# Patient Record
Sex: FEMALE | Race: BLACK OR AFRICAN AMERICAN | NOT HISPANIC OR LATINO | Employment: FULL TIME | RURAL
[De-identification: names, ages, dates, MRNs, and addresses within clinical notes are randomized per-mention and may not be internally consistent; named-entity substitution may affect disease eponyms.]

---

## 2021-03-26 ENCOUNTER — OFFICE VISIT (OUTPATIENT)
Dept: FAMILY MEDICINE | Facility: CLINIC | Age: 46
End: 2021-03-26
Payer: COMMERCIAL

## 2021-03-26 VITALS
SYSTOLIC BLOOD PRESSURE: 124 MMHG | RESPIRATION RATE: 16 BRPM | TEMPERATURE: 97 F | BODY MASS INDEX: 37.64 KG/M2 | OXYGEN SATURATION: 99 % | DIASTOLIC BLOOD PRESSURE: 84 MMHG | HEART RATE: 70 BPM | WEIGHT: 248.38 LBS | HEIGHT: 68 IN

## 2021-03-26 DIAGNOSIS — E78.5 HYPERLIPIDEMIA, UNSPECIFIED HYPERLIPIDEMIA TYPE: ICD-10-CM

## 2021-03-26 DIAGNOSIS — K21.9 GASTROESOPHAGEAL REFLUX DISEASE WITHOUT ESOPHAGITIS: ICD-10-CM

## 2021-03-26 DIAGNOSIS — F41.9 ANXIETY: ICD-10-CM

## 2021-03-26 DIAGNOSIS — E11.42 TYPE 2 DIABETES MELLITUS WITH DIABETIC POLYNEUROPATHY, WITH LONG-TERM CURRENT USE OF INSULIN: Primary | ICD-10-CM

## 2021-03-26 DIAGNOSIS — F32.A DEPRESSION, UNSPECIFIED DEPRESSION TYPE: ICD-10-CM

## 2021-03-26 DIAGNOSIS — I10 HYPERTENSION, UNSPECIFIED TYPE: ICD-10-CM

## 2021-03-26 DIAGNOSIS — E66.9 OBESITY, UNSPECIFIED CLASSIFICATION, UNSPECIFIED OBESITY TYPE, UNSPECIFIED WHETHER SERIOUS COMORBIDITY PRESENT: ICD-10-CM

## 2021-03-26 DIAGNOSIS — Z79.4 TYPE 2 DIABETES MELLITUS WITH DIABETIC POLYNEUROPATHY, WITH LONG-TERM CURRENT USE OF INSULIN: Primary | ICD-10-CM

## 2021-03-26 PROCEDURE — 1126F AMNT PAIN NOTED NONE PRSNT: CPT | Mod: ,,, | Performed by: NURSE PRACTITIONER

## 2021-03-26 PROCEDURE — 99214 OFFICE O/P EST MOD 30 MIN: CPT | Mod: ,,, | Performed by: NURSE PRACTITIONER

## 2021-03-26 PROCEDURE — 1126F PR PAIN SEVERITY QUANTIFIED, NO PAIN PRESENT: ICD-10-PCS | Mod: ,,, | Performed by: NURSE PRACTITIONER

## 2021-03-26 PROCEDURE — 99214 PR OFFICE/OUTPT VISIT, EST, LEVL IV, 30-39 MIN: ICD-10-PCS | Mod: ,,, | Performed by: NURSE PRACTITIONER

## 2021-03-26 RX ORDER — SEMAGLUTIDE 1.34 MG/ML
0.5 INJECTION, SOLUTION SUBCUTANEOUS
COMMUNITY
End: 2021-06-10 | Stop reason: SDUPTHER

## 2021-03-26 RX ORDER — METFORMIN HYDROCHLORIDE 500 MG/1
500 TABLET ORAL 2 TIMES DAILY WITH MEALS
COMMUNITY
End: 2021-06-10 | Stop reason: SDUPTHER

## 2021-03-26 RX ORDER — TRIAMTERENE/HYDROCHLOROTHIAZID 37.5-25 MG
1 TABLET ORAL DAILY
COMMUNITY
Start: 2021-01-22 | End: 2021-06-10 | Stop reason: SDUPTHER

## 2021-03-26 RX ORDER — CITALOPRAM 20 MG/1
20 TABLET, FILM COATED ORAL DAILY
COMMUNITY
Start: 2021-02-26 | End: 2021-06-10 | Stop reason: SDUPTHER

## 2021-03-26 RX ORDER — INSULIN DEGLUDEC 100 U/ML
60 INJECTION, SOLUTION SUBCUTANEOUS NIGHTLY
COMMUNITY

## 2021-03-26 RX ORDER — INSULIN ASPART 100 [IU]/ML
40 INJECTION, SOLUTION INTRAVENOUS; SUBCUTANEOUS 3 TIMES DAILY
COMMUNITY
Start: 2020-12-31

## 2021-04-20 ENCOUNTER — HOSPITAL ENCOUNTER (EMERGENCY)
Facility: HOSPITAL | Age: 46
Discharge: HOME | End: 2021-04-20
Payer: COMMERCIAL

## 2021-04-20 VITALS
SYSTOLIC BLOOD PRESSURE: 130 MMHG | HEIGHT: 68 IN | RESPIRATION RATE: 18 BRPM | WEIGHT: 244 LBS | OXYGEN SATURATION: 98 % | BODY MASS INDEX: 36.98 KG/M2 | DIASTOLIC BLOOD PRESSURE: 85 MMHG | TEMPERATURE: 98 F | HEART RATE: 76 BPM

## 2021-04-20 DIAGNOSIS — F32.A DEPRESSION: ICD-10-CM

## 2021-04-20 DIAGNOSIS — E78.5 HYPERLIPIDEMIA: ICD-10-CM

## 2021-04-20 DIAGNOSIS — R10.10 UPPER ABDOMINAL PAIN: ICD-10-CM

## 2021-04-20 DIAGNOSIS — Z79.4 TYPE 2 DIABETES MELLITUS WITH DIABETIC POLYNEUROPATHY, WITH LONG-TERM CURRENT USE OF INSULIN: ICD-10-CM

## 2021-04-20 DIAGNOSIS — F41.9 ANXIETY: ICD-10-CM

## 2021-04-20 DIAGNOSIS — E66.9 OBESITY: ICD-10-CM

## 2021-04-20 DIAGNOSIS — I10 HYPERTENSION: ICD-10-CM

## 2021-04-20 DIAGNOSIS — K21.9 GASTROESOPHAGEAL REFLUX DISEASE WITHOUT ESOPHAGITIS: ICD-10-CM

## 2021-04-20 DIAGNOSIS — R10.9 ABDOMINAL PAIN, UNSPECIFIED ABDOMINAL LOCATION: Primary | ICD-10-CM

## 2021-04-20 DIAGNOSIS — E11.42 TYPE 2 DIABETES MELLITUS WITH DIABETIC POLYNEUROPATHY, WITH LONG-TERM CURRENT USE OF INSULIN: ICD-10-CM

## 2021-04-20 DIAGNOSIS — K59.00 CONSTIPATION, UNSPECIFIED CONSTIPATION TYPE: ICD-10-CM

## 2021-04-20 LAB
ALBUMIN SERPL BCP-MCNC: 3.8 G/DL (ref 3.5–5)
ALBUMIN/GLOB SERPL: 0.9 {RATIO}
ALP SERPL-CCNC: 119 U/L (ref 37–98)
ALT SERPL W P-5'-P-CCNC: 73 U/L (ref 13–56)
AMYLASE SERPL-CCNC: 38 U/L (ref 25–115)
ANION GAP SERPL CALCULATED.3IONS-SCNC: 11 MMOL/L (ref 7–16)
AST SERPL W P-5'-P-CCNC: 17 U/L (ref 15–37)
BASOPHILS # BLD AUTO: 0.03 K/UL (ref 0–0.2)
BASOPHILS NFR BLD AUTO: 0.3 % (ref 0–1)
BILIRUB SERPL-MCNC: 0.4 MG/DL (ref 0–1.2)
BILIRUB UR QL STRIP: NEGATIVE
BUN SERPL-MCNC: 9 MG/DL (ref 7–18)
BUN/CREAT SERPL: 9 (ref 6–20)
CALCIUM SERPL-MCNC: 9.3 MG/DL (ref 8.5–10.1)
CHLORIDE SERPL-SCNC: 102 MMOL/L (ref 98–107)
CLARITY UR: CLEAR
CO2 SERPL-SCNC: 32 MMOL/L (ref 21–32)
COLOR UR: YELLOW
CREAT SERPL-MCNC: 0.99 MG/DL (ref 0.55–1.02)
DIFFERENTIAL METHOD BLD: ABNORMAL
EOSINOPHIL # BLD AUTO: 0.26 K/UL (ref 0–0.5)
EOSINOPHIL NFR BLD AUTO: 2.4 % (ref 1–4)
ERYTHROCYTE [DISTWIDTH] IN BLOOD BY AUTOMATED COUNT: 12.7 % (ref 11.5–14.5)
GLOBULIN SER-MCNC: 4.4 G/DL (ref 2–4)
GLUCOSE SERPL-MCNC: 176 MG/DL (ref 74–106)
GLUCOSE UR STRIP-MCNC: NEGATIVE MG/DL
HCT VFR BLD AUTO: 37.9 % (ref 38–47)
HGB BLD-MCNC: 12.6 G/DL (ref 12–16)
IMM GRANULOCYTES # BLD AUTO: 0.04 K/UL (ref 0–0.04)
IMM GRANULOCYTES NFR BLD: 0.4 % (ref 0–0.4)
KETONES UR STRIP-SCNC: NEGATIVE MG/DL
LEUKOCYTE ESTERASE UR QL STRIP: NEGATIVE
LIPASE SERPL-CCNC: 189 U/L (ref 73–393)
LYMPHOCYTES # BLD AUTO: 2.91 K/UL (ref 1–4.8)
LYMPHOCYTES NFR BLD AUTO: 26.6 % (ref 27–41)
MCH RBC QN AUTO: 29.6 PG (ref 27–31)
MCHC RBC AUTO-ENTMCNC: 33.2 G/DL (ref 32–36)
MCV RBC AUTO: 89.2 FL (ref 80–96)
MONOCYTES # BLD AUTO: 0.42 K/UL (ref 0–0.8)
MONOCYTES NFR BLD AUTO: 3.8 % (ref 2–6)
MPC BLD CALC-MCNC: 10 FL (ref 9.4–12.4)
NEUTROPHILS # BLD AUTO: 7.27 K/UL (ref 1.8–7.7)
NEUTROPHILS NFR BLD AUTO: 66.5 % (ref 53–65)
NITRITE UR QL STRIP: NEGATIVE
NRBC # BLD AUTO: 0 X10E3/UL
NRBC, AUTO (.00): 0 %
PH UR STRIP: 5.5 PH UNITS
PLATELET # BLD AUTO: 328 K/UL (ref 150–400)
POTASSIUM SERPL-SCNC: 3.8 MMOL/L (ref 3.5–5.1)
PROT SERPL-MCNC: 8.2 G/DL (ref 6.4–8.2)
PROT UR QL STRIP: NEGATIVE
RBC # BLD AUTO: 4.25 M/UL (ref 4.2–5.4)
RBC # UR STRIP: NEGATIVE /UL
SODIUM SERPL-SCNC: 141 MMOL/L (ref 136–145)
SP GR UR STRIP: 1.02
UROBILINOGEN UR STRIP-ACNC: 0.2 MG/DL
WBC # BLD AUTO: 10.93 K/UL (ref 4.5–11)

## 2021-04-20 PROCEDURE — 80053 COMPREHEN METABOLIC PANEL: CPT | Performed by: NURSE PRACTITIONER

## 2021-04-20 PROCEDURE — 25500020 PHARM REV CODE 255: Performed by: NURSE PRACTITIONER

## 2021-04-20 PROCEDURE — 99283 PR EMERGENCY DEPT VISIT,LEVEL III: ICD-10-PCS | Mod: ,,, | Performed by: NURSE PRACTITIONER

## 2021-04-20 PROCEDURE — 99283 EMERGENCY DEPT VISIT LOW MDM: CPT | Mod: ,,, | Performed by: NURSE PRACTITIONER

## 2021-04-20 PROCEDURE — 85025 COMPLETE CBC W/AUTO DIFF WBC: CPT | Performed by: NURSE PRACTITIONER

## 2021-04-20 PROCEDURE — 99285 EMERGENCY DEPT VISIT HI MDM: CPT | Mod: 25

## 2021-04-20 PROCEDURE — 82150 ASSAY OF AMYLASE: CPT | Performed by: NURSE PRACTITIONER

## 2021-04-20 PROCEDURE — 36415 COLL VENOUS BLD VENIPUNCTURE: CPT | Performed by: NURSE PRACTITIONER

## 2021-04-20 PROCEDURE — 81003 URINALYSIS AUTO W/O SCOPE: CPT | Performed by: NURSE PRACTITIONER

## 2021-04-20 PROCEDURE — 83690 ASSAY OF LIPASE: CPT | Performed by: NURSE PRACTITIONER

## 2021-04-20 RX ORDER — POLYETHYLENE GLYCOL 3350 17 G/17G
17 POWDER, FOR SOLUTION ORAL DAILY
Qty: 10 PACKET | Refills: 0 | Status: SHIPPED | OUTPATIENT
Start: 2021-04-20 | End: 2023-09-14

## 2021-04-20 RX ORDER — ONDANSETRON 4 MG/1
4 TABLET, FILM COATED ORAL EVERY 6 HOURS
Qty: 12 TABLET | Refills: 0 | Status: SHIPPED | OUTPATIENT
Start: 2021-04-20 | End: 2023-01-19

## 2021-04-20 RX ADMIN — IOPAMIDOL 100 ML: 755 INJECTION, SOLUTION INTRAVENOUS at 10:04

## 2021-04-26 DIAGNOSIS — R10.10 UPPER ABDOMINAL PAIN: Primary | ICD-10-CM

## 2021-05-11 ENCOUNTER — ANESTHESIA EVENT (OUTPATIENT)
Dept: GASTROENTEROLOGY | Facility: HOSPITAL | Age: 46
End: 2021-05-11
Payer: COMMERCIAL

## 2021-05-11 ENCOUNTER — HOSPITAL ENCOUNTER (OUTPATIENT)
Dept: GASTROENTEROLOGY | Facility: HOSPITAL | Age: 46
Discharge: HOME | End: 2021-05-11
Attending: INTERNAL MEDICINE
Payer: COMMERCIAL

## 2021-05-11 ENCOUNTER — ANESTHESIA (OUTPATIENT)
Dept: GASTROENTEROLOGY | Facility: HOSPITAL | Age: 46
End: 2021-05-11
Payer: COMMERCIAL

## 2021-05-11 VITALS
OXYGEN SATURATION: 100 % | DIASTOLIC BLOOD PRESSURE: 79 MMHG | RESPIRATION RATE: 12 BRPM | WEIGHT: 225 LBS | BODY MASS INDEX: 34.1 KG/M2 | SYSTOLIC BLOOD PRESSURE: 128 MMHG | HEIGHT: 68 IN | HEART RATE: 65 BPM | TEMPERATURE: 98 F

## 2021-05-11 DIAGNOSIS — R10.10 UPPER ABDOMINAL PAIN: ICD-10-CM

## 2021-05-11 DIAGNOSIS — R10.13 EPIGASTRIC PAIN: ICD-10-CM

## 2021-05-11 DIAGNOSIS — R13.19 ESOPHAGEAL DYSPHAGIA: ICD-10-CM

## 2021-05-11 LAB
GLUCOSE SERPL-MCNC: 127 MG/DL (ref 70–105)
GLUCOSE SERPL-MCNC: 127 MG/DL (ref 70–110)

## 2021-05-11 PROCEDURE — 27000284 HC CANNULA NASAL: Performed by: NURSE ANESTHETIST, CERTIFIED REGISTERED

## 2021-05-11 PROCEDURE — 88305 SURGICAL PATHOLOGY: ICD-10-PCS | Mod: 26,,, | Performed by: PATHOLOGY

## 2021-05-11 PROCEDURE — 43450 DILATE ESOPHAGUS 1/MULT PASS: CPT

## 2021-05-11 PROCEDURE — 82962 GLUCOSE BLOOD TEST: CPT

## 2021-05-11 PROCEDURE — 88342 SURGICAL PATHOLOGY: ICD-10-PCS | Mod: 26,,, | Performed by: PATHOLOGY

## 2021-05-11 PROCEDURE — 25000003 PHARM REV CODE 250: Performed by: NURSE ANESTHETIST, CERTIFIED REGISTERED

## 2021-05-11 PROCEDURE — 88342 IMHCHEM/IMCYTCHM 1ST ANTB: CPT | Mod: 26,,, | Performed by: PATHOLOGY

## 2021-05-11 PROCEDURE — C1889 IMPLANT/INSERT DEVICE, NOC: HCPCS

## 2021-05-11 PROCEDURE — 37000009 HC ANESTHESIA EA ADD 15 MINS

## 2021-05-11 PROCEDURE — 88305 TISSUE EXAM BY PATHOLOGIST: CPT | Mod: 26,,, | Performed by: PATHOLOGY

## 2021-05-11 PROCEDURE — 63600175 PHARM REV CODE 636 W HCPCS: Performed by: NURSE ANESTHETIST, CERTIFIED REGISTERED

## 2021-05-11 PROCEDURE — D9220A PRA ANESTHESIA: Mod: ,,, | Performed by: NURSE ANESTHETIST, CERTIFIED REGISTERED

## 2021-05-11 PROCEDURE — 88305 TISSUE EXAM BY PATHOLOGIST: CPT | Mod: SUR | Performed by: INTERNAL MEDICINE

## 2021-05-11 PROCEDURE — 27201423 OPTIME MED/SURG SUP & DEVICES STERILE SUPPLY

## 2021-05-11 PROCEDURE — 37000008 HC ANESTHESIA 1ST 15 MINUTES

## 2021-05-11 PROCEDURE — D9220A PRA ANESTHESIA: ICD-10-PCS | Mod: ,,, | Performed by: NURSE ANESTHETIST, CERTIFIED REGISTERED

## 2021-05-11 PROCEDURE — 43239 EGD BIOPSY SINGLE/MULTIPLE: CPT

## 2021-05-11 RX ORDER — PROPOFOL 10 MG/ML
VIAL (ML) INTRAVENOUS
Status: DISCONTINUED | OUTPATIENT
Start: 2021-05-11 | End: 2021-05-11

## 2021-05-11 RX ORDER — FENTANYL CITRATE 50 UG/ML
INJECTION, SOLUTION INTRAMUSCULAR; INTRAVENOUS
Status: DISCONTINUED | OUTPATIENT
Start: 2021-05-11 | End: 2021-05-11

## 2021-05-11 RX ORDER — LIDOCAINE HYDROCHLORIDE 20 MG/ML
INJECTION INTRAVENOUS
Status: DISCONTINUED | OUTPATIENT
Start: 2021-05-11 | End: 2021-05-11

## 2021-05-11 RX ORDER — SODIUM CHLORIDE 0.9 % (FLUSH) 0.9 %
10 SYRINGE (ML) INJECTION
Status: DISCONTINUED | OUTPATIENT
Start: 2021-05-11 | End: 2021-05-12 | Stop reason: HOSPADM

## 2021-05-11 RX ORDER — PANTOPRAZOLE SODIUM 40 MG/1
40 TABLET, DELAYED RELEASE ORAL DAILY
Qty: 30 TABLET | Refills: 2 | Status: SHIPPED | OUTPATIENT
Start: 2021-05-11 | End: 2021-06-10 | Stop reason: SDUPTHER

## 2021-05-11 RX ADMIN — PROPOFOL 50 MG: 10 INJECTION, EMULSION INTRAVENOUS at 01:05

## 2021-05-11 RX ADMIN — FENTANYL CITRATE 50 MCG: 50 INJECTION INTRAMUSCULAR; INTRAVENOUS at 01:05

## 2021-05-11 RX ADMIN — SODIUM CHLORIDE: 9 INJECTION, SOLUTION INTRAVENOUS at 01:05

## 2021-05-11 RX ADMIN — LIDOCAINE HYDROCHLORIDE 50 MG: 20 INJECTION, SOLUTION INTRAVENOUS at 01:05

## 2021-05-12 LAB
DHEA SERPL-MCNC: NORMAL
ESTROGEN SERPL-MCNC: NORMAL PG/ML
LAB AP GROSS DESCRIPTION: NORMAL
LAB AP LABORATORY NOTES: NORMAL
T3RU NFR SERPL: NORMAL %

## 2021-05-14 ENCOUNTER — TELEPHONE (OUTPATIENT)
Dept: GASTROENTEROLOGY | Facility: CLINIC | Age: 46
End: 2021-05-14

## 2021-05-17 ENCOUNTER — OFFICE VISIT (OUTPATIENT)
Dept: GASTROENTEROLOGY | Facility: CLINIC | Age: 46
End: 2021-05-17
Payer: COMMERCIAL

## 2021-05-17 VITALS
HEIGHT: 68 IN | WEIGHT: 244 LBS | SYSTOLIC BLOOD PRESSURE: 125 MMHG | HEART RATE: 89 BPM | DIASTOLIC BLOOD PRESSURE: 85 MMHG | OXYGEN SATURATION: 97 % | BODY MASS INDEX: 36.98 KG/M2

## 2021-05-17 DIAGNOSIS — E11.42 TYPE 2 DIABETES MELLITUS WITH DIABETIC POLYNEUROPATHY, WITH LONG-TERM CURRENT USE OF INSULIN: ICD-10-CM

## 2021-05-17 DIAGNOSIS — Z79.4 TYPE 2 DIABETES MELLITUS WITH DIABETIC NEUROPATHY, WITH LONG-TERM CURRENT USE OF INSULIN: ICD-10-CM

## 2021-05-17 DIAGNOSIS — E11.40 TYPE 2 DIABETES MELLITUS WITH DIABETIC NEUROPATHY, WITH LONG-TERM CURRENT USE OF INSULIN: ICD-10-CM

## 2021-05-17 DIAGNOSIS — K76.0 FATTY LIVER: ICD-10-CM

## 2021-05-17 DIAGNOSIS — Z79.4 TYPE 2 DIABETES MELLITUS WITH DIABETIC POLYNEUROPATHY, WITH LONG-TERM CURRENT USE OF INSULIN: ICD-10-CM

## 2021-05-17 DIAGNOSIS — R74.8 ELEVATED LIVER ENZYMES: Primary | ICD-10-CM

## 2021-05-17 DIAGNOSIS — R13.19 ESOPHAGEAL DYSPHAGIA: ICD-10-CM

## 2021-05-17 DIAGNOSIS — R10.13 EPIGASTRIC PAIN: ICD-10-CM

## 2021-05-17 PROCEDURE — 99214 PR OFFICE/OUTPT VISIT, EST, LEVL IV, 30-39 MIN: ICD-10-PCS | Mod: ,,, | Performed by: NURSE PRACTITIONER

## 2021-05-17 PROCEDURE — 99214 OFFICE O/P EST MOD 30 MIN: CPT | Mod: ,,, | Performed by: NURSE PRACTITIONER

## 2021-05-17 PROCEDURE — 3008F PR BODY MASS INDEX (BMI) DOCUMENTED: ICD-10-PCS | Mod: ,,, | Performed by: NURSE PRACTITIONER

## 2021-05-17 PROCEDURE — 3008F BODY MASS INDEX DOCD: CPT | Mod: ,,, | Performed by: NURSE PRACTITIONER

## 2021-06-10 ENCOUNTER — OFFICE VISIT (OUTPATIENT)
Dept: INTERNAL MEDICINE | Facility: CLINIC | Age: 46
End: 2021-06-10
Payer: COMMERCIAL

## 2021-06-10 VITALS
SYSTOLIC BLOOD PRESSURE: 130 MMHG | HEART RATE: 86 BPM | HEIGHT: 68 IN | DIASTOLIC BLOOD PRESSURE: 70 MMHG | WEIGHT: 236 LBS | RESPIRATION RATE: 16 BRPM | OXYGEN SATURATION: 97 % | BODY MASS INDEX: 35.77 KG/M2

## 2021-06-10 DIAGNOSIS — K75.81 NASH (NONALCOHOLIC STEATOHEPATITIS): ICD-10-CM

## 2021-06-10 DIAGNOSIS — G25.81 RESTLESS LEGS: ICD-10-CM

## 2021-06-10 DIAGNOSIS — I10 ESSENTIAL HYPERTENSION: ICD-10-CM

## 2021-06-10 DIAGNOSIS — K21.9 GASTROESOPHAGEAL REFLUX DISEASE, UNSPECIFIED WHETHER ESOPHAGITIS PRESENT: ICD-10-CM

## 2021-06-10 DIAGNOSIS — E11.9 TYPE 2 DIABETES MELLITUS WITHOUT COMPLICATION, WITH LONG-TERM CURRENT USE OF INSULIN: ICD-10-CM

## 2021-06-10 DIAGNOSIS — Z76.89 ENCOUNTER TO ESTABLISH CARE WITH NEW DOCTOR: Primary | ICD-10-CM

## 2021-06-10 DIAGNOSIS — Z79.4 TYPE 2 DIABETES MELLITUS WITHOUT COMPLICATION, WITH LONG-TERM CURRENT USE OF INSULIN: ICD-10-CM

## 2021-06-10 PROCEDURE — 99214 PR OFFICE/OUTPT VISIT, EST, LEVL IV, 30-39 MIN: ICD-10-PCS | Mod: S$PBB,,, | Performed by: INTERNAL MEDICINE

## 2021-06-10 PROCEDURE — 99214 OFFICE O/P EST MOD 30 MIN: CPT | Mod: S$PBB,,, | Performed by: INTERNAL MEDICINE

## 2021-06-10 PROCEDURE — 3008F BODY MASS INDEX DOCD: CPT | Mod: ,,, | Performed by: INTERNAL MEDICINE

## 2021-06-10 PROCEDURE — 1126F AMNT PAIN NOTED NONE PRSNT: CPT | Mod: ,,, | Performed by: INTERNAL MEDICINE

## 2021-06-10 PROCEDURE — 1126F PR PAIN SEVERITY QUANTIFIED, NO PAIN PRESENT: ICD-10-PCS | Mod: ,,, | Performed by: INTERNAL MEDICINE

## 2021-06-10 PROCEDURE — 99214 OFFICE O/P EST MOD 30 MIN: CPT | Mod: PBBFAC | Performed by: INTERNAL MEDICINE

## 2021-06-10 PROCEDURE — 3008F PR BODY MASS INDEX (BMI) DOCUMENTED: ICD-10-PCS | Mod: ,,, | Performed by: INTERNAL MEDICINE

## 2021-06-10 RX ORDER — PANTOPRAZOLE SODIUM 40 MG/1
40 TABLET, DELAYED RELEASE ORAL DAILY
Qty: 90 TABLET | Refills: 3 | Status: SHIPPED | OUTPATIENT
Start: 2021-06-10 | End: 2022-07-05

## 2021-06-10 RX ORDER — ROPINIROLE 0.25 MG/1
0.25 TABLET, FILM COATED ORAL NIGHTLY
Qty: 30 TABLET | Refills: 3 | Status: SHIPPED | OUTPATIENT
Start: 2021-06-10 | End: 2023-09-14

## 2021-06-10 RX ORDER — GABAPENTIN 300 MG/1
300 CAPSULE ORAL DAILY
Qty: 90 CAPSULE | Refills: 3 | Status: SHIPPED | OUTPATIENT
Start: 2021-06-10 | End: 2023-09-14 | Stop reason: SDUPTHER

## 2021-06-10 RX ORDER — METFORMIN HYDROCHLORIDE 500 MG/1
500 TABLET ORAL 2 TIMES DAILY WITH MEALS
Qty: 180 TABLET | Refills: 3 | Status: SHIPPED | OUTPATIENT
Start: 2021-06-10 | End: 2022-07-31

## 2021-06-10 RX ORDER — TRIAMTERENE/HYDROCHLOROTHIAZID 37.5-25 MG
1 TABLET ORAL DAILY
Qty: 90 TABLET | Refills: 3 | Status: SHIPPED | OUTPATIENT
Start: 2021-06-10 | End: 2022-08-08

## 2021-06-10 RX ORDER — CITALOPRAM 20 MG/1
20 TABLET, FILM COATED ORAL DAILY
Qty: 90 TABLET | Refills: 3 | Status: SHIPPED | OUTPATIENT
Start: 2021-06-10 | End: 2022-08-08

## 2021-06-10 RX ORDER — GABAPENTIN 300 MG/1
CAPSULE ORAL
COMMUNITY
Start: 2021-06-04 | End: 2021-06-10 | Stop reason: SDUPTHER

## 2021-06-10 RX ORDER — SEMAGLUTIDE 1.34 MG/ML
0.5 INJECTION, SOLUTION SUBCUTANEOUS
Qty: 1 PEN | Refills: 11 | Status: SHIPPED | OUTPATIENT
Start: 2021-06-10 | End: 2022-06-17

## 2021-06-11 ENCOUNTER — HOSPITAL ENCOUNTER (OUTPATIENT)
Dept: RADIOLOGY | Facility: HOSPITAL | Age: 46
Discharge: HOME | End: 2021-06-11
Attending: NURSE PRACTITIONER
Payer: COMMERCIAL

## 2021-06-11 DIAGNOSIS — K76.0 FATTY LIVER: ICD-10-CM

## 2021-06-11 DIAGNOSIS — R74.8 ELEVATED LIVER ENZYMES: ICD-10-CM

## 2021-06-11 PROCEDURE — 91200 LIVER ELASTOGRAPHY: CPT | Mod: 26,,,

## 2021-06-11 PROCEDURE — 91200 US ELASTOGRAPHY LIVER: ICD-10-PCS | Mod: 26,,,

## 2021-06-11 PROCEDURE — 91200 LIVER ELASTOGRAPHY: CPT | Mod: TC

## 2021-06-14 ENCOUNTER — OFFICE VISIT (OUTPATIENT)
Dept: GASTROENTEROLOGY | Facility: CLINIC | Age: 46
End: 2021-06-14
Payer: COMMERCIAL

## 2021-06-14 VITALS
WEIGHT: 238 LBS | HEIGHT: 68 IN | DIASTOLIC BLOOD PRESSURE: 85 MMHG | SYSTOLIC BLOOD PRESSURE: 136 MMHG | BODY MASS INDEX: 36.07 KG/M2 | HEART RATE: 90 BPM | OXYGEN SATURATION: 98 %

## 2021-06-14 DIAGNOSIS — K75.81 NASH (NONALCOHOLIC STEATOHEPATITIS): ICD-10-CM

## 2021-06-14 DIAGNOSIS — K21.9 GASTROESOPHAGEAL REFLUX DISEASE, UNSPECIFIED WHETHER ESOPHAGITIS PRESENT: ICD-10-CM

## 2021-06-14 DIAGNOSIS — R74.8 ELEVATED LIVER ENZYMES: Primary | ICD-10-CM

## 2021-06-14 PROCEDURE — 3008F BODY MASS INDEX DOCD: CPT | Mod: ,,, | Performed by: NURSE PRACTITIONER

## 2021-06-14 PROCEDURE — 99214 OFFICE O/P EST MOD 30 MIN: CPT | Mod: ,,, | Performed by: NURSE PRACTITIONER

## 2021-06-14 PROCEDURE — 99214 PR OFFICE/OUTPT VISIT, EST, LEVL IV, 30-39 MIN: ICD-10-PCS | Mod: ,,, | Performed by: NURSE PRACTITIONER

## 2021-06-14 PROCEDURE — 3008F PR BODY MASS INDEX (BMI) DOCUMENTED: ICD-10-PCS | Mod: ,,, | Performed by: NURSE PRACTITIONER

## 2021-06-17 ENCOUNTER — TELEPHONE (OUTPATIENT)
Dept: GASTROENTEROLOGY | Facility: CLINIC | Age: 46
End: 2021-06-17

## 2021-06-17 DIAGNOSIS — R74.8 ELEVATED LIVER ENZYMES: Primary | ICD-10-CM

## 2021-06-17 RX ORDER — PREDNISONE 20 MG/1
20 TABLET ORAL DAILY
Qty: 14 TABLET | Refills: 0 | Status: SHIPPED | OUTPATIENT
Start: 2021-06-17 | End: 2021-07-01

## 2021-07-06 ENCOUNTER — OFFICE VISIT (OUTPATIENT)
Dept: GASTROENTEROLOGY | Facility: CLINIC | Age: 46
End: 2021-07-06
Payer: COMMERCIAL

## 2021-07-06 VITALS
BODY MASS INDEX: 34.71 KG/M2 | HEIGHT: 68 IN | SYSTOLIC BLOOD PRESSURE: 137 MMHG | HEART RATE: 96 BPM | DIASTOLIC BLOOD PRESSURE: 86 MMHG | OXYGEN SATURATION: 98 % | WEIGHT: 229 LBS

## 2021-07-06 DIAGNOSIS — R74.8 ELEVATED LIVER ENZYMES: Primary | ICD-10-CM

## 2021-07-06 DIAGNOSIS — K59.00 CONSTIPATION, UNSPECIFIED CONSTIPATION TYPE: ICD-10-CM

## 2021-07-06 DIAGNOSIS — K75.81 NASH (NONALCOHOLIC STEATOHEPATITIS): ICD-10-CM

## 2021-07-06 DIAGNOSIS — K21.9 GASTROESOPHAGEAL REFLUX DISEASE, UNSPECIFIED WHETHER ESOPHAGITIS PRESENT: ICD-10-CM

## 2021-07-06 PROCEDURE — 3008F PR BODY MASS INDEX (BMI) DOCUMENTED: ICD-10-PCS | Mod: ,,, | Performed by: NURSE PRACTITIONER

## 2021-07-06 PROCEDURE — 99214 PR OFFICE/OUTPT VISIT, EST, LEVL IV, 30-39 MIN: ICD-10-PCS | Mod: ,,, | Performed by: NURSE PRACTITIONER

## 2021-07-06 PROCEDURE — 3008F BODY MASS INDEX DOCD: CPT | Mod: ,,, | Performed by: NURSE PRACTITIONER

## 2021-07-06 PROCEDURE — 99214 OFFICE O/P EST MOD 30 MIN: CPT | Mod: ,,, | Performed by: NURSE PRACTITIONER

## 2021-07-07 ENCOUNTER — TELEPHONE (OUTPATIENT)
Dept: GASTROENTEROLOGY | Facility: CLINIC | Age: 46
End: 2021-07-07

## 2021-07-07 DIAGNOSIS — R74.8 ELEVATED LIVER ENZYMES: Primary | ICD-10-CM

## 2021-07-07 RX ORDER — PREDNISONE 20 MG/1
20 TABLET ORAL DAILY
Qty: 14 TABLET | Refills: 0 | Status: SHIPPED | OUTPATIENT
Start: 2021-07-07 | End: 2023-01-19

## 2021-12-14 ENCOUNTER — HOSPITAL ENCOUNTER (OUTPATIENT)
Dept: RADIOLOGY | Facility: HOSPITAL | Age: 46
Discharge: HOME | End: 2021-12-14
Attending: NURSE PRACTITIONER
Payer: COMMERCIAL

## 2021-12-14 ENCOUNTER — OFFICE VISIT (OUTPATIENT)
Dept: GASTROENTEROLOGY | Facility: CLINIC | Age: 46
End: 2021-12-14
Payer: COMMERCIAL

## 2021-12-14 VITALS
BODY MASS INDEX: 34.56 KG/M2 | DIASTOLIC BLOOD PRESSURE: 90 MMHG | SYSTOLIC BLOOD PRESSURE: 138 MMHG | WEIGHT: 228 LBS | OXYGEN SATURATION: 98 % | HEIGHT: 68 IN | HEART RATE: 75 BPM

## 2021-12-14 DIAGNOSIS — K75.81 NASH (NONALCOHOLIC STEATOHEPATITIS): Primary | ICD-10-CM

## 2021-12-14 DIAGNOSIS — R74.8 ELEVATED LIVER ENZYMES: ICD-10-CM

## 2021-12-14 DIAGNOSIS — K21.9 GASTROESOPHAGEAL REFLUX DISEASE, UNSPECIFIED WHETHER ESOPHAGITIS PRESENT: ICD-10-CM

## 2021-12-14 PROCEDURE — 76700 US EXAM ABDOM COMPLETE: CPT | Mod: 26,,, | Performed by: RADIOLOGY

## 2021-12-14 PROCEDURE — 99214 OFFICE O/P EST MOD 30 MIN: CPT | Mod: ,,, | Performed by: NURSE PRACTITIONER

## 2021-12-14 PROCEDURE — 99214 PR OFFICE/OUTPT VISIT, EST, LEVL IV, 30-39 MIN: ICD-10-PCS | Mod: ,,, | Performed by: NURSE PRACTITIONER

## 2021-12-14 PROCEDURE — 76700 US EXAM ABDOM COMPLETE: CPT | Mod: TC

## 2021-12-14 PROCEDURE — 76700 US ABDOMEN COMPLETE: ICD-10-PCS | Mod: 26,,, | Performed by: RADIOLOGY

## 2022-03-23 ENCOUNTER — HOSPITAL ENCOUNTER (EMERGENCY)
Facility: HOSPITAL | Age: 47
Discharge: HOME | End: 2022-03-23
Payer: COMMERCIAL

## 2022-03-23 VITALS
HEIGHT: 68 IN | SYSTOLIC BLOOD PRESSURE: 134 MMHG | TEMPERATURE: 99 F | OXYGEN SATURATION: 100 % | DIASTOLIC BLOOD PRESSURE: 93 MMHG | WEIGHT: 226 LBS | HEART RATE: 74 BPM | BODY MASS INDEX: 34.25 KG/M2 | RESPIRATION RATE: 16 BRPM

## 2022-03-23 DIAGNOSIS — Z87.19 HISTORY OF ESOPHAGEAL STRICTURE: Primary | ICD-10-CM

## 2022-03-23 DIAGNOSIS — R13.19 ESOPHAGEAL DYSPHAGIA: ICD-10-CM

## 2022-03-23 DIAGNOSIS — E11.65 TYPE 2 DIABETES MELLITUS WITH HYPERGLYCEMIA, UNSPECIFIED WHETHER LONG TERM INSULIN USE: ICD-10-CM

## 2022-03-23 LAB
ALBUMIN SERPL BCP-MCNC: 3.8 G/DL (ref 3.5–5)
ALBUMIN/GLOB SERPL: 1 {RATIO}
ALP SERPL-CCNC: 113 U/L (ref 39–100)
ALT SERPL W P-5'-P-CCNC: 65 U/L (ref 13–56)
ANION GAP SERPL CALCULATED.3IONS-SCNC: 9 MMOL/L (ref 7–16)
AST SERPL W P-5'-P-CCNC: 13 U/L (ref 15–37)
BASOPHILS # BLD AUTO: 0.03 K/UL (ref 0–0.2)
BASOPHILS NFR BLD AUTO: 0.3 % (ref 0–1)
BILIRUB SERPL-MCNC: 0.4 MG/DL (ref 0–1.2)
BILIRUB UR QL STRIP: NEGATIVE
BUN SERPL-MCNC: 6 MG/DL (ref 7–18)
BUN/CREAT SERPL: 7 (ref 6–20)
CALCIUM SERPL-MCNC: 9.8 MG/DL (ref 8.5–10.1)
CHLORIDE SERPL-SCNC: 97 MMOL/L (ref 98–107)
CLARITY UR: CLEAR
CO2 SERPL-SCNC: 32 MMOL/L (ref 21–32)
COLOR UR: YELLOW
CREAT SERPL-MCNC: 0.86 MG/DL (ref 0.55–1.02)
DIFFERENTIAL METHOD BLD: ABNORMAL
EOSINOPHIL # BLD AUTO: 0.2 K/UL (ref 0–0.5)
EOSINOPHIL NFR BLD AUTO: 1.8 % (ref 1–4)
ERYTHROCYTE [DISTWIDTH] IN BLOOD BY AUTOMATED COUNT: 12.5 % (ref 11.5–14.5)
GLOBULIN SER-MCNC: 3.9 G/DL (ref 2–4)
GLUCOSE SERPL-MCNC: 256 MG/DL (ref 70–105)
GLUCOSE SERPL-MCNC: 342 MG/DL (ref 74–106)
GLUCOSE SERPL-MCNC: 354 MG/DL (ref 70–105)
GLUCOSE UR STRIP-MCNC: >=1000 MG/DL
HCT VFR BLD AUTO: 39.1 % (ref 38–47)
HGB BLD-MCNC: 13.6 G/DL (ref 12–16)
IMM GRANULOCYTES # BLD AUTO: 0.05 K/UL (ref 0–0.04)
IMM GRANULOCYTES NFR BLD: 0.5 % (ref 0–0.4)
KETONES UR STRIP-SCNC: NEGATIVE MG/DL
LEUKOCYTE ESTERASE UR QL STRIP: NEGATIVE
LYMPHOCYTES # BLD AUTO: 3.98 K/UL (ref 1–4.8)
LYMPHOCYTES NFR BLD AUTO: 36.5 % (ref 27–41)
MCH RBC QN AUTO: 29.2 PG (ref 27–31)
MCHC RBC AUTO-ENTMCNC: 34.8 G/DL (ref 32–36)
MCV RBC AUTO: 84.1 FL (ref 80–96)
MONOCYTES # BLD AUTO: 0.56 K/UL (ref 0–0.8)
MONOCYTES NFR BLD AUTO: 5.1 % (ref 2–6)
MPC BLD CALC-MCNC: 10.8 FL (ref 9.4–12.4)
NEUTROPHILS # BLD AUTO: 6.09 K/UL (ref 1.8–7.7)
NEUTROPHILS NFR BLD AUTO: 55.8 % (ref 53–65)
NITRITE UR QL STRIP: NEGATIVE
NRBC # BLD AUTO: 0 X10E3/UL
NRBC, AUTO (.00): 0 %
PH UR STRIP: 5.5 PH UNITS
PLATELET # BLD AUTO: 328 K/UL (ref 150–400)
POTASSIUM SERPL-SCNC: 3.7 MMOL/L (ref 3.5–5.1)
PROT SERPL-MCNC: 7.7 G/DL (ref 6.4–8.2)
PROT UR QL STRIP: NEGATIVE
RBC # BLD AUTO: 4.65 M/UL (ref 4.2–5.4)
RBC # UR STRIP: NEGATIVE /UL
SODIUM SERPL-SCNC: 134 MMOL/L (ref 136–145)
SP GR UR STRIP: <=1.005
UROBILINOGEN UR STRIP-ACNC: 0.2 MG/DL
WBC # BLD AUTO: 10.91 K/UL (ref 4.5–11)

## 2022-03-23 PROCEDURE — 80053 COMPREHEN METABOLIC PANEL: CPT | Performed by: NURSE PRACTITIONER

## 2022-03-23 PROCEDURE — 99284 EMERGENCY DEPT VISIT MOD MDM: CPT | Mod: 25

## 2022-03-23 PROCEDURE — 82962 GLUCOSE BLOOD TEST: CPT

## 2022-03-23 PROCEDURE — 63600175 PHARM REV CODE 636 W HCPCS: Performed by: NURSE PRACTITIONER

## 2022-03-23 PROCEDURE — 99283 PR EMERGENCY DEPT VISIT,LEVEL III: ICD-10-PCS | Mod: ICN,,, | Performed by: NURSE PRACTITIONER

## 2022-03-23 PROCEDURE — 96374 THER/PROPH/DIAG INJ IV PUSH: CPT

## 2022-03-23 PROCEDURE — 36415 COLL VENOUS BLD VENIPUNCTURE: CPT | Performed by: NURSE PRACTITIONER

## 2022-03-23 PROCEDURE — 99283 EMERGENCY DEPT VISIT LOW MDM: CPT | Mod: ICN,,, | Performed by: NURSE PRACTITIONER

## 2022-03-23 PROCEDURE — 85025 COMPLETE CBC W/AUTO DIFF WBC: CPT | Performed by: NURSE PRACTITIONER

## 2022-03-23 PROCEDURE — 96361 HYDRATE IV INFUSION ADD-ON: CPT

## 2022-03-23 PROCEDURE — 81003 URINALYSIS AUTO W/O SCOPE: CPT | Performed by: NURSE PRACTITIONER

## 2022-03-23 PROCEDURE — 25000003 PHARM REV CODE 250: Performed by: NURSE PRACTITIONER

## 2022-03-23 RX ORDER — MAG HYDROX/ALUMINUM HYD/SIMETH 200-200-20
30 SUSPENSION, ORAL (FINAL DOSE FORM) ORAL ONCE
Status: COMPLETED | OUTPATIENT
Start: 2022-03-23 | End: 2022-03-23

## 2022-03-23 RX ORDER — SODIUM CHLORIDE 9 MG/ML
1000 INJECTION, SOLUTION INTRAVENOUS
Status: COMPLETED | OUTPATIENT
Start: 2022-03-23 | End: 2022-03-23

## 2022-03-23 RX ORDER — LIDOCAINE HYDROCHLORIDE 20 MG/ML
10 SOLUTION OROPHARYNGEAL ONCE
Status: COMPLETED | OUTPATIENT
Start: 2022-03-23 | End: 2022-03-23

## 2022-03-23 RX ADMIN — LIDOCAINE HYDROCHLORIDE 10 ML: 20 SOLUTION ORAL; TOPICAL at 05:03

## 2022-03-23 RX ADMIN — ALUMINUM HYDROXIDE, MAGNESIUM HYDROXIDE, AND SIMETHICONE 30 ML: 200; 200; 20 SUSPENSION ORAL at 05:03

## 2022-03-23 RX ADMIN — SODIUM CHLORIDE 1000 ML: 9 INJECTION, SOLUTION INTRAVENOUS at 05:03

## 2022-03-23 RX ADMIN — HUMAN INSULIN 10 UNITS: 100 INJECTION, SOLUTION SUBCUTANEOUS at 06:03

## 2022-03-23 NOTE — DISCHARGE INSTRUCTIONS
Continue to take prescriptions as previously directed. Monitor your glucose 3-4x a day and keep a log to take to your primary care provider within the next week. A referral has been sent to Dr. Ferrera, they will call you with an appointment date and time. Return to the ED for worsening signs and symptoms or otherwise as needed.

## 2022-03-23 NOTE — ED TRIAGE NOTES
Pt c/o dysphagia, has had esophageal dilitation in past, pt states she needs another, states she has been trying to make an appointment with no success for a week

## 2022-03-23 NOTE — ED PROVIDER NOTES
"Encounter Date: 3/23/2022       History     Chief Complaint   Patient presents with    Dysphagia    Sore Throat     Pt states has been having difficulty swallowing for 1x week, gets worse by the day, tried making appointment with Dr. Ferrera     45 y/o AAF with PMH of DM, HTN and hx of esophageal stricture with prior dilation presents with c/o worsening dysphagia and indigestion. Reports has difficulty with solids and liquids with food feeling like it just gets "stuck". Reports symptoms are worse at night when she is lying down. Reports she would like to get back in with Dr. Ferrera to be seen for possible dilation again. She denies shortness of breath or chest pain. Denies nausea or vomiting or abd pain. States having  Normal BM. States she has tried taking tums with minimal relief. There are no exacerbating or remitting factors.     The history is provided by the patient.     Review of patient's allergies indicates:   Allergen Reactions    Phenergan plain Other (See Comments)     Pt states feeling 'funny'     Past Medical History:   Diagnosis Date    Allergy     Depression     Diabetes mellitus, type 2     Epigastric pain 2021    Hypertension     Sickle cell anemia     trait not anemia     Past Surgical History:   Procedure Laterality Date    APPENDECTOMY      CARPAL TUNNEL RELEASE      CARPAL TUNNEL RELEASE       SECTION      CHOLECYSTECTOMY      ESOPHAGOGASTRODUODENOSCOPY  2021    HYSTERECTOMY       Family History   Problem Relation Age of Onset    Hypertension Mother     Diabetes Mother     Hyperlipidemia Mother     Glaucoma Mother     Cancer Father      Social History     Tobacco Use    Smoking status: Never Smoker    Smokeless tobacco: Never Used   Substance Use Topics    Alcohol use: Never    Drug use: Never     Review of Systems   Constitutional: Negative for chills and fever.        Per HPI   Respiratory: Negative for cough, choking, chest tightness and shortness " of breath.    Cardiovascular: Negative for chest pain, palpitations and leg swelling.   Gastrointestinal: Negative for abdominal pain, constipation, diarrhea, nausea and vomiting.   Genitourinary: Negative for dysuria and frequency.   Musculoskeletal: Negative for back pain.   Neurological: Negative for dizziness, weakness and headaches.   All other systems reviewed and are negative.      Physical Exam     Initial Vitals [03/23/22 1627]   BP Pulse Resp Temp SpO2   (!) 134/93 74 16 98.6 °F (37 °C) 100 %      MAP       --         Physical Exam    Constitutional: She appears well-developed and well-nourished. She is Obese . She is active and cooperative.   Cardiovascular: Normal rate, regular rhythm, normal heart sounds and normal pulses.   Pulmonary/Chest: Effort normal and breath sounds normal.   Abdominal: Abdomen is soft. Bowel sounds are normal. There is no abdominal tenderness.     Neurological: She is alert and oriented to person, place, and time.   Skin: Skin is warm, dry and intact. Capillary refill takes less than 2 seconds.   Psychiatric: She has a normal mood and affect. Her speech is normal and behavior is normal. Judgment and thought content normal. Cognition and memory are normal.         Medical Screening Exam   See Full Note    ED Course   Procedures  Labs Reviewed   COMPREHENSIVE METABOLIC PANEL - Abnormal; Notable for the following components:       Result Value    Sodium 134 (*)     Chloride 97 (*)     Glucose 342 (*)     BUN 6 (*)     Alk Phos 113 (*)     ALT 65 (*)     AST 13 (*)     All other components within normal limits   URINALYSIS, REFLEX TO URINE CULTURE - Abnormal; Notable for the following components:    Glucose, UA >=1000 (*)     All other components within normal limits   POCT GLUCOSE MONITORING CONTINUOUS - Abnormal; Notable for the following components:    POC Glucose 354 (*)     All other components within normal limits   POCT GLUCOSE MONITORING CONTINUOUS - Abnormal; Notable for  the following components:    POC Glucose 256 (*)     All other components within normal limits   CBC W/ AUTO DIFFERENTIAL    Narrative:     The following orders were created for panel order CBC auto differential.  Procedure                               Abnormality         Status                     ---------                               -----------         ------                     CBC with Differential[[id number]]                            In process                   Please view results for these tests on the individual orders.   CBC WITH DIFFERENTIAL   EXTRA TUBES    Narrative:     The following orders were created for panel order EXTRA TUBES.  Procedure                               Abnormality         Status                     ---------                               -----------         ------                     Light Blue Top Hold[[id number]]                              In process                 Light Green Top Hold[[id number]]                             In process                   Please view results for these tests on the individual orders.   LIGHT BLUE TOP HOLD   LIGHT GREEN TOP HOLD          Imaging Results          XR ABDOMEN, ACUTE 2 OR MORE VIEWS WITH CHEST (Final result)  Result time 03/23/22 17:29:52    Final result by Kelvin Torres DO (03/23/22 17:29:52)                 Impression:      No acute findings.    Point of Service: [facility]      Electronically signed by: Kelvin Torres  Date:    03/23/2022  Time:    17:29             Narrative:    EXAMINATION:  XR ABDOMEN ACUTE 2 OR MORE VIEWS WITH CHEST    CLINICAL HISTORY:  abd pain, dysphagia;    COMPARISON:  Abdominal x-ray April 20, 2021    TECHNIQUE:  Single frontal view of the chest as well as frontal views of the abdomen in the supine and upright  position.    FINDINGS:  Heart size appears within normal limits.  No focal consolidation, pleural effusion, or pneumothorax.  Visualized osseous and surrounding soft tissue  structures demonstrate no acute abnormality.  Surgical clips project over the right upper quadrant of the abdomen.  Nonspecific nonobstructive bowel gas pattern.  No free intraperitoneal air demonstrated.                                 Medications   aluminum-magnesium hydroxide-simethicone 200-200-20 mg/5 mL suspension 30 mL (30 mLs Oral Given 3/23/22 1737)     And   LIDOcaine HCl 2% oral solution 10 mL (10 mLs Oral Given 3/23/22 1737)   0.9%  NaCl infusion (0 mLs Intravenous Stopped 3/23/22 1836)   insulin regular injection 10 Units (10 Units Intravenous Given 3/23/22 1800)                Counseled on supportive measures. Warning s/s discussed and return precautions given; the patient has v/u.         Clinical Impression:   Final diagnoses:  [Z87.19] History of esophageal stricture (Primary)  [E11.65] Type 2 diabetes mellitus with hyperglycemia, unspecified whether long term insulin use  [R13.19] Esophageal dysphagia          ED Disposition Condition    Discharge Stable        ED Prescriptions     None        Follow-up Information     Follow up With Specialties Details Why Contact Info    [facility] In 2 days  [address] [place] [id number]  [phone]             LACEY Winston  03/23/22 2540

## 2022-03-24 NOTE — ED NOTES
Lab specimen (blood-red, green, purple and blue tops) collected at 17:38.  At  19:15 CBC had not resulted yet.  I called [phone] and they said to call [facility].  Ankit at [facility] looked for purple top and could not find it.

## 2022-04-12 DIAGNOSIS — R13.19 ESOPHAGEAL DYSPHAGIA: ICD-10-CM

## 2022-04-12 DIAGNOSIS — Z87.19 HISTORY OF ESOPHAGEAL STRICTURE: Primary | ICD-10-CM

## 2022-04-22 DIAGNOSIS — Z11.59 SPECIAL SCREENING EXAMINATION FOR VIRAL DISEASE: Primary | ICD-10-CM

## 2022-04-27 ENCOUNTER — ANESTHESIA EVENT (OUTPATIENT)
Dept: GASTROENTEROLOGY | Facility: HOSPITAL | Age: 47
End: 2022-04-27
Payer: COMMERCIAL

## 2022-04-27 ENCOUNTER — ANESTHESIA (OUTPATIENT)
Dept: GASTROENTEROLOGY | Facility: HOSPITAL | Age: 47
End: 2022-04-27
Payer: COMMERCIAL

## 2022-04-27 ENCOUNTER — HOSPITAL ENCOUNTER (OUTPATIENT)
Dept: GASTROENTEROLOGY | Facility: HOSPITAL | Age: 47
Discharge: HOME | End: 2022-04-27
Attending: INTERNAL MEDICINE
Payer: COMMERCIAL

## 2022-04-27 VITALS
BODY MASS INDEX: 35.61 KG/M2 | HEIGHT: 68 IN | RESPIRATION RATE: 16 BRPM | DIASTOLIC BLOOD PRESSURE: 71 MMHG | TEMPERATURE: 98 F | SYSTOLIC BLOOD PRESSURE: 119 MMHG | WEIGHT: 235 LBS | HEART RATE: 75 BPM | OXYGEN SATURATION: 100 %

## 2022-04-27 DIAGNOSIS — Z87.19 HISTORY OF ESOPHAGEAL STRICTURE: ICD-10-CM

## 2022-04-27 DIAGNOSIS — R13.19 ESOPHAGEAL DYSPHAGIA: ICD-10-CM

## 2022-04-27 DIAGNOSIS — K29.00 ACUTE SUPERFICIAL GASTRITIS WITHOUT HEMORRHAGE: ICD-10-CM

## 2022-04-27 LAB
GLUCOSE SERPL-MCNC: 389 MG/DL (ref 70–105)
GLUCOSE SERPL-MCNC: 389 MG/DL (ref 70–110)
GLUCOSE SERPL-MCNC: 419 MG/DL (ref 70–105)
GLUCOSE SERPL-MCNC: 450 MG/DL (ref 70–105)
GLUCOSE SERPL-MCNC: 450 MG/DL (ref 70–110)
GLUCOSE SERPL-MCNC: 478 MG/DL (ref 70–110)

## 2022-04-27 PROCEDURE — 43450 DILATE ESOPHAGUS 1/MULT PASS: CPT

## 2022-04-27 PROCEDURE — 82962 GLUCOSE BLOOD TEST: CPT

## 2022-04-27 PROCEDURE — 88305 TISSUE EXAM BY PATHOLOGIST: CPT | Mod: SUR | Performed by: INTERNAL MEDICINE

## 2022-04-27 PROCEDURE — 25000003 PHARM REV CODE 250: Performed by: NURSE ANESTHETIST, CERTIFIED REGISTERED

## 2022-04-27 PROCEDURE — 27000284 HC CANNULA NASAL: Performed by: NURSE ANESTHETIST, CERTIFIED REGISTERED

## 2022-04-27 PROCEDURE — 37000008 HC ANESTHESIA 1ST 15 MINUTES

## 2022-04-27 PROCEDURE — 88342 SURGICAL PATHOLOGY: ICD-10-PCS | Mod: 26,,, | Performed by: PATHOLOGY

## 2022-04-27 PROCEDURE — 27201423 OPTIME MED/SURG SUP & DEVICES STERILE SUPPLY

## 2022-04-27 PROCEDURE — 63600175 PHARM REV CODE 636 W HCPCS: Performed by: INTERNAL MEDICINE

## 2022-04-27 PROCEDURE — 88342 IMHCHEM/IMCYTCHM 1ST ANTB: CPT | Mod: 26,,, | Performed by: PATHOLOGY

## 2022-04-27 PROCEDURE — 88305 TISSUE EXAM BY PATHOLOGIST: CPT | Mod: 26,,, | Performed by: PATHOLOGY

## 2022-04-27 PROCEDURE — 63600175 PHARM REV CODE 636 W HCPCS: Performed by: NURSE ANESTHETIST, CERTIFIED REGISTERED

## 2022-04-27 PROCEDURE — D9220A PRA ANESTHESIA: Mod: ANES,,, | Performed by: ANESTHESIOLOGY

## 2022-04-27 PROCEDURE — C1889 IMPLANT/INSERT DEVICE, NOC: HCPCS

## 2022-04-27 PROCEDURE — 27000284 HC CANNULA NASAL: Performed by: ANESTHESIOLOGY

## 2022-04-27 PROCEDURE — D9220A PRA ANESTHESIA: ICD-10-PCS | Mod: ANES,,, | Performed by: ANESTHESIOLOGY

## 2022-04-27 PROCEDURE — 88305 SURGICAL PATHOLOGY: ICD-10-PCS | Mod: 26,,, | Performed by: PATHOLOGY

## 2022-04-27 PROCEDURE — D9220A PRA ANESTHESIA: Mod: CRNA,,, | Performed by: NURSE ANESTHETIST, CERTIFIED REGISTERED

## 2022-04-27 PROCEDURE — D9220A PRA ANESTHESIA: ICD-10-PCS | Mod: CRNA,,, | Performed by: NURSE ANESTHETIST, CERTIFIED REGISTERED

## 2022-04-27 PROCEDURE — 43239 EGD BIOPSY SINGLE/MULTIPLE: CPT

## 2022-04-27 PROCEDURE — 37000009 HC ANESTHESIA EA ADD 15 MINS

## 2022-04-27 RX ORDER — SODIUM CHLORIDE 0.9 % (FLUSH) 0.9 %
10 SYRINGE (ML) INJECTION
Status: DISCONTINUED | OUTPATIENT
Start: 2022-04-27 | End: 2022-04-28 | Stop reason: HOSPADM

## 2022-04-27 RX ORDER — PROPOFOL 10 MG/ML
VIAL (ML) INTRAVENOUS
Status: DISCONTINUED | OUTPATIENT
Start: 2022-04-27 | End: 2022-04-27

## 2022-04-27 RX ORDER — LIDOCAINE HYDROCHLORIDE 20 MG/ML
INJECTION, SOLUTION EPIDURAL; INFILTRATION; INTRACAUDAL; PERINEURAL
Status: DISCONTINUED | OUTPATIENT
Start: 2022-04-27 | End: 2022-04-27

## 2022-04-27 RX ADMIN — LIDOCAINE HYDROCHLORIDE 50 MG: 20 INJECTION, SOLUTION EPIDURAL; INFILTRATION; INTRACAUDAL; PERINEURAL at 12:04

## 2022-04-27 RX ADMIN — PROPOFOL 25 MG: 10 INJECTION, EMULSION INTRAVENOUS at 12:04

## 2022-04-27 RX ADMIN — SODIUM CHLORIDE: 9 INJECTION, SOLUTION INTRAVENOUS at 11:04

## 2022-04-27 RX ADMIN — HUMAN INSULIN 5 UNITS: 100 INJECTION, SOLUTION SUBCUTANEOUS at 12:04

## 2022-04-27 RX ADMIN — HUMAN INSULIN 10 UNITS: 100 INJECTION, SOLUTION SUBCUTANEOUS at 12:04

## 2022-04-27 RX ADMIN — HUMAN INSULIN 10 UNITS: 100 INJECTION, SOLUTION SUBCUTANEOUS at 01:04

## 2022-04-27 NOTE — OR NURSING
Rechecked patient blood glucose. Blood glucose 419. Informed Dr Ferrera. Received orders to give 10 units regular insulin subcutaneously. Received orders to recheck blood glucose in 30 minutes, inform patient to take home blood glucose medication when discharged.

## 2022-04-27 NOTE — H&P
[facility]  Gastroenterology  H&P    Patient Name: Cole Herrera  MRN: [id number]  Admission Date: 2022  Code Status: Full Code    Attending Provider: Kirk Ferrera MD  Primary Care Physician: [facility]  Principal Problem:<principal problem not specified>    Subjective:     History of Present Illness: Pt has esophageal dysphagia; for egd with dilation.    Past Medical History:   Diagnosis Date    Allergy     Depression     Diabetes mellitus, type 2     Epigastric pain 2021    Hypertension     Sickle cell anemia     trait not anemia       Past Surgical History:   Procedure Laterality Date    APPENDECTOMY      CARPAL TUNNEL RELEASE      CARPAL TUNNEL RELEASE       SECTION      CHOLECYSTECTOMY      ESOPHAGOGASTRODUODENOSCOPY  2021    HYSTERECTOMY         Review of patient's allergies indicates:   Allergen Reactions    Phenergan plain Other (See Comments)     Pt states feeling 'funny'     Family History     Problem Relation (Age of Onset)    Cancer Father    Diabetes Mother    Glaucoma Mother    Hyperlipidemia Mother    Hypertension Mother        Tobacco Use    Smoking status: Never Smoker    Smokeless tobacco: Never Used   Substance and Sexual Activity    Alcohol use: Never    Drug use: Never    Sexual activity: Yes     Review of Systems   Respiratory: Negative.    Cardiovascular: Negative.    Gastrointestinal: Negative.      Objective:     Vital Signs (Most Recent):  Temp: 97.7 °F (36.5 °C) (22 1045)  Pulse: 87 (22 1045)  Resp: 13 (22 1045)  BP: 114/79 (22 1045)  SpO2: (!) 81 % (22 1045) Vital Signs (24h Range):  Temp:  [97.7 °F (36.5 °C)] 97.7 °F (36.5 °C)  Pulse:  [87] 87  Resp:  [13] 13  SpO2:  [81 %] 81 %  BP: (114)/(79) 114/79     Weight: 106.6 kg (235 lb) (22 1045)  Body mass index is 35.73 kg/m².    No intake or output data in the 24 hours ending 22 1212    Lines/Drains/Airways     Peripheral  Intravenous Line  Duration                Peripheral IV - Single Lumen 04/27/22 1039 22 G Anterior;Proximal;Right Forearm <1 day                Physical Exam  Vitals reviewed.   Constitutional:       General: She is not in acute distress.     Appearance: Normal appearance. She is well-developed. She is not ill-appearing.   HENT:      Head: Normocephalic and atraumatic.      Nose: Nose normal.   Eyes:      Pupils: Pupils are equal, round, and reactive to light.   Cardiovascular:      Rate and Rhythm: Normal rate and regular rhythm.   Pulmonary:      Effort: Pulmonary effort is normal.      Breath sounds: Normal breath sounds. No wheezing.   Abdominal:      General: Abdomen is flat. Bowel sounds are normal. There is no distension.      Palpations: Abdomen is soft.      Tenderness: There is no abdominal tenderness. There is no guarding.   Skin:     General: Skin is warm and dry.      Coloration: Skin is not jaundiced.   Neurological:      Mental Status: She is alert.   Psychiatric:         Attention and Perception: Attention normal.         Mood and Affect: Affect normal.         Speech: Speech normal.         Behavior: Behavior is cooperative.      Comments: Pt was calm while speaking.         Significant Labs:  CBC: No results for input(s): WBC, HGB, HCT, PLT in the last 48 hours.  CMP: No results for input(s): GLU, CALCIUM, ALBUMIN, PROT, NA, K, CO2, CL, BUN, CREATININE, ALKPHOS, ALT, AST, BILITOT in the last 48 hours.    Significant Imaging:  Imaging results within the past 24 hours have been reviewed.    Assessment/Plan:     There are no hospital problems to display for this patient.        Imp: esophageal dysphagia  Plan: egd with dilation    Kirk Ferrera MD  Gastroenterology  [facility]

## 2022-04-27 NOTE — TRANSFER OF CARE
"Anesthesia Transfer of Care Note    Patient: Cole Herrera    Procedure(s) Performed: EGD with dilation    Patient location: GI    Anesthesia Type: general    Transport from OR: Transported from OR on room air with adequate spontaneous ventilation. Continuous ECG monitoring in transport. Continuous SpO2 monitoring in transport    Post pain: adequate analgesia    Post assessment: no apparent anesthetic complications    Post vital signs: stable    Level of consciousness: responds to stimulation    Nausea/Vomiting: no nausea/vomiting    Complications: none    Transfer of care protocol was followedComments: accu check in recovery      Last vitals:   Visit Vitals  /68 (BP Location: Right arm, Patient Position: Lying)   Pulse 98   Temp 36.6 °C (97.8 °F) (Oral)   Resp (!) 26   Ht 5' 8" (1.727 m)   Wt 106.6 kg (235 lb)   SpO2 97%   Breastfeeding No   BMI 35.73 kg/m²     "

## 2022-04-27 NOTE — ANESTHESIA PREPROCEDURE EVALUATION
04/27/2022  Cole Herrera is a 46 y.o., female.      Pre-op Assessment    I have reviewed the Patient Summary Reports.    I have reviewed the NPO Status.   I have reviewed the Medications.     Review of Systems         Anesthesia Plan  Type of Anesthesia, risks & benefits discussed:    Anesthesia Type: Gen Natural Airway  Intra-op Monitoring Plan: Standard ASA Monitors  Post Op Pain Control Plan: IV/PO Opioids PRN  Induction:  IV  Informed Consent: Informed consent signed with the Patient and all parties understand the risks and agree with anesthesia plan.  All questions answered.   ASA Score: 3    Ready For Surgery From Anesthesia Perspective.     .  NPO greater than 8 hours  NAC  Allergic to phenergan    Hct 39  Accucheck 478    Diabetes mellitus, type 2 Sickle cell anemia   Depression Allergy   Hypertension Epigastric pain   Blood glucose is quite high this morning (this is uncharacteristic for her; Dr. Ferrera's plan is to treat her with a combination of IV and subcutaneous insulin . . . If the blood glucose does not decrease appropriately, he will send her to the ER)    Airway exam deferred (COVID precautions); adequate ROM at neck.

## 2022-04-27 NOTE — ANESTHESIA PREPROCEDURE EVALUATION
04/27/2022  Cole Herrera is a 46 y.o., female.      Pre-op Assessment    I have reviewed the Patient Summary Reports.     I have reviewed the Nursing Notes. I have reviewed the NPO Status.   I have reviewed the Medications.     Review of Systems  Social:  Non-Smoker    Hematology/Oncology:         -- Anemia: Hematology Comments: Sickle Cell   Cardiovascular:   Hypertension hyperlipidemia    Hepatic/GI:   GERD Liver Disease, Hepatitis    Endocrine:   Diabetes  Obesity / BMI > 30  Psych:   depression             Anesthesia Plan  Type of Anesthesia, risks & benefits discussed:    Anesthesia Type: Gen Natural Airway  Intra-op Monitoring Plan: Standard ASA Monitors  Post Op Pain Control Plan: IV/PO Opioids PRN  Induction:  IV  Informed Consent: Informed consent signed with the Patient and all parties understand the risks and agree with anesthesia plan.  All questions answered. Patient consented to blood products? Yes  ASA Score: 3  Day of Surgery Review of History & Physical: H&P Update referred to the surgeon/provider.I have interviewed and examined the patient. I have reviewed the patient's H&P dated: There are no significant changes.     Ready For Surgery From Anesthesia Perspective.     .

## 2022-04-27 NOTE — ANESTHESIA POSTPROCEDURE EVALUATION
Anesthesia Post Evaluation    Patient: Cole Herrera    Procedure(s) Performed: * No procedures listed *    Final Anesthesia Type: general      Patient location during evaluation: PACU  Post-procedure vital signs: reviewed and stable  Pain management: adequate  Airway patency: patent  JACOB mitigation strategies: Intraoperative administration of CPAP, nasopharyngeal airway, or oral appliance during sedation  PONV status at discharge: No PONV  Anesthetic complications: no      Cardiovascular status: hemodynamically stable  Respiratory status: unassisted  Hydration status: euvolemic  Follow-up not needed.          Vitals Value Taken Time   /66 04/27/22 1315   Temp 36.6 °C (97.8 °F) 04/27/22 1225   Pulse 67 04/27/22 1316   Resp 17 04/27/22 1316   SpO2 100 % 04/27/22 1239   Vitals shown include unvalidated device data.      No case tracking events are documented in the log.      Pain/Juan Alberto Score: Juan Alberto Score: 7 (4/27/2022 12:30 PM)

## 2022-04-27 NOTE — DISCHARGE INSTRUCTIONS
Impression  Overall Impression: Mucosa of the esophagus is normal, biopsies were obtained. The esophagus was dilated with a 48F Wilcox. The stomach has gastritis without ulcers, biopsies were obtained. Mucosa of the duodenum is normal.     Recommendation  Await pathology results; avoid nsaids; ppi 1/AM; repeat dilation prn.     DO NOT DRIVE FOR 24 HOURS OR OPERATE HEAVY MACHINERY.   DO NOT SIGN LEGAL DOCUMENTS.  DRINK PLENTY OF FLUIDS. RESUME DIET.

## 2022-04-28 LAB
ESTROGEN SERPL-MCNC: NORMAL PG/ML
INSULIN SERPL-ACNC: NORMAL U[IU]/ML
LAB AP GROSS DESCRIPTION: NORMAL
LAB AP LABORATORY NOTES: NORMAL
T3RU NFR SERPL: NORMAL %

## 2022-05-20 ENCOUNTER — OFFICE VISIT (OUTPATIENT)
Dept: FAMILY MEDICINE | Facility: CLINIC | Age: 47
End: 2022-05-20
Payer: COMMERCIAL

## 2022-05-20 VITALS
OXYGEN SATURATION: 98 % | DIASTOLIC BLOOD PRESSURE: 100 MMHG | SYSTOLIC BLOOD PRESSURE: 130 MMHG | WEIGHT: 220.81 LBS | HEART RATE: 82 BPM | BODY MASS INDEX: 33.47 KG/M2 | HEIGHT: 68 IN | RESPIRATION RATE: 20 BRPM | TEMPERATURE: 99 F

## 2022-05-20 DIAGNOSIS — J02.9 SORE THROAT: ICD-10-CM

## 2022-05-20 DIAGNOSIS — J02.9 ACUTE PHARYNGITIS, UNSPECIFIED ETIOLOGY: ICD-10-CM

## 2022-05-20 DIAGNOSIS — Z20.822 EXPOSURE TO COVID-19 VIRUS: Primary | ICD-10-CM

## 2022-05-20 LAB
CTP QC/QA: YES
CTP QC/QA: YES
FLUAV AG NPH QL: NEGATIVE
FLUBV AG NPH QL: NEGATIVE
S PYO RRNA THROAT QL PROBE: NEGATIVE
SARS-COV-2 AG RESP QL IA.RAPID: NEGATIVE

## 2022-05-20 PROCEDURE — 1159F PR MEDICATION LIST DOCUMENTED IN MEDICAL RECORD: ICD-10-PCS | Mod: ,,, | Performed by: FAMILY MEDICINE

## 2022-05-20 PROCEDURE — 87880 STREP A ASSAY W/OPTIC: CPT | Mod: QW,,, | Performed by: FAMILY MEDICINE

## 2022-05-20 PROCEDURE — 3075F SYST BP GE 130 - 139MM HG: CPT | Mod: ,,, | Performed by: FAMILY MEDICINE

## 2022-05-20 PROCEDURE — 99213 OFFICE O/P EST LOW 20 MIN: CPT | Mod: ,,, | Performed by: FAMILY MEDICINE

## 2022-05-20 PROCEDURE — 3080F DIAST BP >= 90 MM HG: CPT | Mod: ,,, | Performed by: FAMILY MEDICINE

## 2022-05-20 PROCEDURE — 87428 SARSCOV & INF VIR A&B AG IA: CPT | Mod: QW,,, | Performed by: FAMILY MEDICINE

## 2022-05-20 PROCEDURE — 99213 PR OFFICE/OUTPT VISIT, EST, LEVL III, 20-29 MIN: ICD-10-PCS | Mod: ,,, | Performed by: FAMILY MEDICINE

## 2022-05-20 PROCEDURE — 3075F PR MOST RECENT SYSTOLIC BLOOD PRESS GE 130-139MM HG: ICD-10-PCS | Mod: ,,, | Performed by: FAMILY MEDICINE

## 2022-05-20 PROCEDURE — 87880 POCT RAPID STREP A: ICD-10-PCS | Mod: QW,,, | Performed by: FAMILY MEDICINE

## 2022-05-20 PROCEDURE — 87428 POCT SARS-COV2 (COVID) WITH FLU ANTIGEN: ICD-10-PCS | Mod: QW,,, | Performed by: FAMILY MEDICINE

## 2022-05-20 PROCEDURE — 3008F PR BODY MASS INDEX (BMI) DOCUMENTED: ICD-10-PCS | Mod: ,,, | Performed by: FAMILY MEDICINE

## 2022-05-20 PROCEDURE — 3008F BODY MASS INDEX DOCD: CPT | Mod: ,,, | Performed by: FAMILY MEDICINE

## 2022-05-20 PROCEDURE — 1159F MED LIST DOCD IN RCRD: CPT | Mod: ,,, | Performed by: FAMILY MEDICINE

## 2022-05-20 PROCEDURE — 3080F PR MOST RECENT DIASTOLIC BLOOD PRESSURE >= 90 MM HG: ICD-10-PCS | Mod: ,,, | Performed by: FAMILY MEDICINE

## 2022-05-20 RX ORDER — AZITHROMYCIN 250 MG/1
TABLET, FILM COATED ORAL
Qty: 6 TABLET | Refills: 0 | Status: SHIPPED | OUTPATIENT
Start: 2022-05-20 | End: 2023-01-19

## 2022-05-20 RX ORDER — AMOXICILLIN 875 MG/1
875 TABLET, FILM COATED ORAL EVERY 12 HOURS
Qty: 20 TABLET | Refills: 0 | Status: CANCELLED | OUTPATIENT
Start: 2022-05-20

## 2022-05-20 RX ORDER — HYDROCODONE BITARTRATE AND ACETAMINOPHEN 7.5; 325 MG/15ML; MG/15ML
SOLUTION ORAL
Qty: 90 ML | Refills: 0 | Status: SHIPPED | OUTPATIENT
Start: 2022-05-20 | End: 2022-07-27 | Stop reason: ALTCHOICE

## 2022-05-20 NOTE — PROGRESS NOTES
Subjective:       Patient ID: Cole Herrera is a 46 y.o. female.    Chief Complaint: Sinus Problem (laryn), Sore Throat (Has laryngitis coughing up green up mucous), and Cough    Scratchy throat for a week severe sore throat began this morning.  No fever    Review of Systems      Objective:      Physical Exam  Constitutional:       Appearance: She is ill-appearing. She is not toxic-appearing.   HENT:      Right Ear: Tympanic membrane normal.      Left Ear: Tympanic membrane normal.      Nose: Congestion and rhinorrhea present.      Mouth/Throat:      Pharynx: Posterior oropharyngeal erythema ( somewhat severe) present. No oropharyngeal exudate.   Cardiovascular:      Rate and Rhythm: Regular rhythm.   Pulmonary:      Effort: Pulmonary effort is normal.      Breath sounds: Rales (Faint scattered coarse crackles) present.   Lymphadenopathy:      Cervical: No cervical adenopathy.   Neurological:      Mental Status: She is alert.         Assessment:       Problem List Items Addressed This Visit    None     Visit Diagnoses     Exposure to COVID-19 virus    -  Primary    Relevant Orders    POCT SARS-COV2 (COVID) with Flu Antigen    Sore throat        Relevant Orders    POCT rapid strep A          Plan:         Strep COVID and flu all negative.  Severe pharyngitis I am going to place her on Zithromax plus hydrocodone for the cough and pain

## 2022-05-20 NOTE — LETTER
May 20, 2022      [facility]– [place][note 2]  [address] [place] [address] [place] [address]  Phone: [phone]  Fax: [phone]       Patient: Cole Herrera   YOB: 1975  Date of Visit: 05/20/2022    To Whom It May Concern:    Wilfredo Herrera  was at [facility] on 05/20/2022. The patient may return to work/school on 05/23/2022 with no restrictions. If you have any questions or concerns, or if I can be of further assistance, please do not hesitate to contact me.    Sincerely,    Dr. Vik De La Vega

## 2022-06-05 ENCOUNTER — HOSPITAL ENCOUNTER (EMERGENCY)
Facility: HOSPITAL | Age: 47
Discharge: HOME | End: 2022-06-05
Payer: COMMERCIAL

## 2022-06-05 VITALS
HEART RATE: 105 BPM | OXYGEN SATURATION: 95 % | TEMPERATURE: 99 F | DIASTOLIC BLOOD PRESSURE: 83 MMHG | WEIGHT: 229 LBS | SYSTOLIC BLOOD PRESSURE: 133 MMHG | BODY MASS INDEX: 34.71 KG/M2 | RESPIRATION RATE: 18 BRPM | HEIGHT: 68 IN

## 2022-06-05 DIAGNOSIS — S89.90XA KNEE INJURY: ICD-10-CM

## 2022-06-05 DIAGNOSIS — S83.411A SPRAIN OF MEDIAL COLLATERAL LIGAMENT OF RIGHT KNEE, INITIAL ENCOUNTER: Primary | ICD-10-CM

## 2022-06-05 PROCEDURE — 63600175 PHARM REV CODE 636 W HCPCS: Performed by: NURSE PRACTITIONER

## 2022-06-05 PROCEDURE — 99283 PR EMERGENCY DEPT VISIT,LEVEL III: ICD-10-PCS | Mod: ,,, | Performed by: NURSE PRACTITIONER

## 2022-06-05 PROCEDURE — 99283 EMERGENCY DEPT VISIT LOW MDM: CPT | Mod: ,,, | Performed by: NURSE PRACTITIONER

## 2022-06-05 PROCEDURE — 96372 THER/PROPH/DIAG INJ SC/IM: CPT

## 2022-06-05 PROCEDURE — 99284 EMERGENCY DEPT VISIT MOD MDM: CPT

## 2022-06-05 RX ORDER — KETOROLAC TROMETHAMINE 30 MG/ML
30 INJECTION, SOLUTION INTRAMUSCULAR; INTRAVENOUS
Status: COMPLETED | OUTPATIENT
Start: 2022-06-05 | End: 2022-06-05

## 2022-06-05 RX ORDER — KETOROLAC TROMETHAMINE 10 MG/1
10 TABLET, FILM COATED ORAL EVERY 6 HOURS PRN
Qty: 20 TABLET | Refills: 0 | Status: SHIPPED | OUTPATIENT
Start: 2022-06-05 | End: 2022-06-10

## 2022-06-05 RX ADMIN — KETOROLAC TROMETHAMINE 30 MG: 30 INJECTION, SOLUTION INTRAMUSCULAR at 08:06

## 2022-06-05 NOTE — ED TRIAGE NOTES
"Patient presents with right knee pain after falling off a mechanical bull yesterday and hearing a "pop"  "

## 2022-06-06 NOTE — ED PROVIDER NOTES
Encounter Date: 2022       History     Chief Complaint   Patient presents with    Knee Pain     Patient states she fell off of a mechanical bull on yesterday and felt her right knee pop. States she had immediate pain and difficulty ambulating. States she has been able to ambulate better today; however, still with significant amount of pain. Does report the swelling has improved.     The history is provided by the patient.   Leg Pain   [place] The injury mechanism was a fall. The incident occurred yesterday. The pain is present in the right knee. The quality of the pain is described as aching and sharp. The pain is at a severity of 8/10. The pain has been intermittent since onset. Associated symptoms include inability to bear weight. Pertinent negatives include no numbness, no loss of motion, no muscle weakness, no loss of sensation and no tingling. She reports no foreign bodies present. The symptoms are aggravated by activity, bearing weight and palpation. She has tried rest for the symptoms. The treatment provided mild relief.     Review of patient's allergies indicates:   Allergen Reactions    Phenergan plain Other (See Comments)     Pt states feeling 'funny'     Past Medical History:   Diagnosis Date    Allergy     Depression     Diabetes mellitus, type 2     Epigastric pain 2021    Hypertension     Sickle cell anemia     trait not anemia     Past Surgical History:   Procedure Laterality Date    APPENDECTOMY      CARPAL TUNNEL RELEASE      CARPAL TUNNEL RELEASE       SECTION      CHOLECYSTECTOMY      ESOPHAGOGASTRODUODENOSCOPY  2021    HYSTERECTOMY       Family History   Problem Relation Age of Onset    Hypertension Mother     Diabetes Mother     Hyperlipidemia Mother     Glaucoma Mother     Cancer Father      Social History     Tobacco Use    Smoking status: Never Smoker    Smokeless tobacco: Never Used   Substance Use Topics    Alcohol  use: Never    Drug use: Never     Review of Systems   Constitutional: Negative for chills and fever.   Gastrointestinal: Negative for abdominal pain, constipation, diarrhea, nausea and vomiting.   Genitourinary: Negative for dysuria, flank pain and frequency.   Musculoskeletal: Positive for arthralgias and joint swelling.   Neurological: Negative for tingling and numbness.   All other systems reviewed and are negative.      Physical Exam     Initial Vitals [06/05/22 1855]   BP Pulse Resp Temp SpO2   133/83 107 18 98.6 °F (37 °C) 99 %      MAP       --         Physical Exam    Constitutional: She appears well-developed and well-nourished. She is cooperative.   Cardiovascular: Normal rate, regular rhythm, normal heart sounds and normal pulses.   Pulmonary/Chest: Effort normal and breath sounds normal.   Abdominal: Abdomen is soft. Bowel sounds are normal. There is no abdominal tenderness.   Musculoskeletal:      Right knee: Swelling (mild) present. No deformity or effusion. Decreased range of motion. Tenderness present over the MCL.     Neurological: She is alert and oriented to person, place, and time.   Skin: Skin is warm, dry and intact. Capillary refill takes less than 2 seconds.   Psychiatric: She has a normal mood and affect. Her speech is normal and behavior is normal. Judgment and thought content normal. Cognition and memory are normal.         Medical Screening Exam   See Full Note    ED Course   Procedures  Labs Reviewed - No data to display       Imaging Results          X-Ray Knee 3 View Right (Final result)  Result time 06/05/22 19:48:33    Final result by Joshua Mcqueen MD (06/05/22 19:48:33)                 Impression:      Mild degenerative changes.  No acute abnormality.      Electronically signed by: Joshua Mcqueen  Date:    06/05/2022  Time:    19:48             Narrative:    EXAMINATION:  XR KNEE 3 VIEW RIGHT    CLINICAL HISTORY:  Unspecified injury of unspecified lower leg, initial  encounter    TECHNIQUE:  AP, lateral, and Merchant views of the right knee were performed.    COMPARISON:  None    FINDINGS:  There is no fracture.  No dislocation.  Mild tricompartmental degenerative changes.                                 Medications   ketorolac injection 30 mg (30 mg Intramuscular Given 6/5/22 2040)         Counseled on supportive measures. Warning s/s discussed and return precautions given; the patient has v/u.                Clinical Impression:   Final diagnoses:  [S89.90XA] Knee injury  [S83.411A] Sprain of medial collateral ligament of right knee, initial encounter (Primary)          ED Disposition Condition    Discharge Stable        ED Prescriptions     Medication Sig Dispense Start Date End Date Auth. Provider    ketorolac (TORADOL) 10 mg tablet Take 1 tablet (10 mg total) by mouth every 6 (six) hours as needed for Pain. Take with food 20 tablet 6/5/2022 6/10/2022 L[facility]        Follow-up Information     Follow up With Specialties Details Why Contact Info    [facility]   [address] [place] [id number]  [phone]             LACEY Winston  06/05/22 2059

## 2022-06-06 NOTE — DISCHARGE INSTRUCTIONS
Take prescriptions as directed. Alternate with tylenol. Avoid taking other NSAIDS while taking toradol, such as Ibuprofen, motrin, aleve. Follow up with PCP as needed. A referral has been sent to orthopedics, they will call you with an appointment date and time. Nonweight bearing to right lower extremity x2 weeks. Return to the ED for worsening signs and symptoms or otherwise as needed.

## 2022-06-14 ENCOUNTER — OFFICE VISIT (OUTPATIENT)
Dept: OBSTETRICS AND GYNECOLOGY | Facility: CLINIC | Age: 47
End: 2022-06-14
Payer: COMMERCIAL

## 2022-06-14 VITALS
HEIGHT: 68 IN | WEIGHT: 229 LBS | BODY MASS INDEX: 34.71 KG/M2 | DIASTOLIC BLOOD PRESSURE: 78 MMHG | SYSTOLIC BLOOD PRESSURE: 130 MMHG

## 2022-06-14 DIAGNOSIS — N89.8 VAGINAL ITCHING: Primary | ICD-10-CM

## 2022-06-14 PROCEDURE — 3008F BODY MASS INDEX DOCD: CPT | Mod: ,,, | Performed by: OBSTETRICS & GYNECOLOGY

## 2022-06-14 PROCEDURE — 1160F PR REVIEW ALL MEDS BY PRESCRIBER/CLIN PHARMACIST DOCUMENTED: ICD-10-PCS | Mod: ,,, | Performed by: OBSTETRICS & GYNECOLOGY

## 2022-06-14 PROCEDURE — 1159F PR MEDICATION LIST DOCUMENTED IN MEDICAL RECORD: ICD-10-PCS | Mod: ,,, | Performed by: OBSTETRICS & GYNECOLOGY

## 2022-06-14 PROCEDURE — 3075F SYST BP GE 130 - 139MM HG: CPT | Mod: ,,, | Performed by: OBSTETRICS & GYNECOLOGY

## 2022-06-14 PROCEDURE — 99203 OFFICE O/P NEW LOW 30 MIN: CPT | Mod: S$PBB,,, | Performed by: OBSTETRICS & GYNECOLOGY

## 2022-06-14 PROCEDURE — 1160F RVW MEDS BY RX/DR IN RCRD: CPT | Mod: ,,, | Performed by: OBSTETRICS & GYNECOLOGY

## 2022-06-14 PROCEDURE — 3078F DIAST BP <80 MM HG: CPT | Mod: ,,, | Performed by: OBSTETRICS & GYNECOLOGY

## 2022-06-14 PROCEDURE — 3075F PR MOST RECENT SYSTOLIC BLOOD PRESS GE 130-139MM HG: ICD-10-PCS | Mod: ,,, | Performed by: OBSTETRICS & GYNECOLOGY

## 2022-06-14 PROCEDURE — 1159F MED LIST DOCD IN RCRD: CPT | Mod: ,,, | Performed by: OBSTETRICS & GYNECOLOGY

## 2022-06-14 PROCEDURE — 3008F PR BODY MASS INDEX (BMI) DOCUMENTED: ICD-10-PCS | Mod: ,,, | Performed by: OBSTETRICS & GYNECOLOGY

## 2022-06-14 PROCEDURE — 99203 PR OFFICE/OUTPT VISIT, NEW, LEVL III, 30-44 MIN: ICD-10-PCS | Mod: S$PBB,,, | Performed by: OBSTETRICS & GYNECOLOGY

## 2022-06-14 PROCEDURE — 3078F PR MOST RECENT DIASTOLIC BLOOD PRESSURE < 80 MM HG: ICD-10-PCS | Mod: ,,, | Performed by: OBSTETRICS & GYNECOLOGY

## 2022-06-14 PROCEDURE — 99214 OFFICE O/P EST MOD 30 MIN: CPT | Mod: PBBFAC | Performed by: OBSTETRICS & GYNECOLOGY

## 2022-06-14 RX ORDER — METRONIDAZOLE 500 MG/1
500 TABLET ORAL 2 TIMES DAILY
Qty: 14 TABLET | Refills: 0 | Status: SHIPPED | OUTPATIENT
Start: 2022-06-14 | End: 2022-06-21

## 2022-06-27 ENCOUNTER — OFFICE VISIT (OUTPATIENT)
Dept: ORTHOPEDICS | Facility: CLINIC | Age: 47
End: 2022-06-27
Payer: COMMERCIAL

## 2022-06-27 VITALS — HEIGHT: 68 IN | WEIGHT: 229 LBS | BODY MASS INDEX: 34.71 KG/M2

## 2022-06-27 DIAGNOSIS — S83.411A SPRAIN OF MEDIAL COLLATERAL LIGAMENT OF RIGHT KNEE, INITIAL ENCOUNTER: ICD-10-CM

## 2022-06-27 DIAGNOSIS — M25.561 ACUTE PAIN OF RIGHT KNEE: Primary | ICD-10-CM

## 2022-06-27 PROCEDURE — 99213 OFFICE O/P EST LOW 20 MIN: CPT | Mod: ,,, | Performed by: NURSE PRACTITIONER

## 2022-06-27 PROCEDURE — 99213 PR OFFICE/OUTPT VISIT, EST, LEVL III, 20-29 MIN: ICD-10-PCS | Mod: ,,, | Performed by: NURSE PRACTITIONER

## 2022-06-27 PROCEDURE — 3008F BODY MASS INDEX DOCD: CPT | Mod: ,,, | Performed by: NURSE PRACTITIONER

## 2022-06-27 PROCEDURE — 1159F MED LIST DOCD IN RCRD: CPT | Mod: ,,, | Performed by: NURSE PRACTITIONER

## 2022-06-27 PROCEDURE — 1159F PR MEDICATION LIST DOCUMENTED IN MEDICAL RECORD: ICD-10-PCS | Mod: ,,, | Performed by: NURSE PRACTITIONER

## 2022-06-27 PROCEDURE — 3008F PR BODY MASS INDEX (BMI) DOCUMENTED: ICD-10-PCS | Mod: ,,, | Performed by: NURSE PRACTITIONER

## 2022-06-27 NOTE — PROGRESS NOTES
ASSESSMENT:      ICD-10-CM ICD-9-CM   1. Acute pain of right knee  M25.561 719.46   2. Sprain of medial collateral ligament of right knee, initial encounter  S83.411A 844.1       PLAN:     Findings and treatment options were discussed with the patient regarding the diagnosis.   All questions were answered regarding Cole Herrera 's painful knee.      Natural history and expected course discussed. Questions answered. Educational materials distributed. Reduction in offending activity. Straight leg brace. OTC analgesics as needed. MRI.  MRI right knee  Will call with results  There are no Patient Instructions on file for this visit.    IMAGING:   X-Ray Knee 3 View Right    Result Date: 6/5/2022  EXAMINATION: XR KNEE 3 VIEW RIGHT CLINICAL HISTORY: Unspecified injury of unspecified lower leg, initial encounter TECHNIQUE: AP, lateral, and Merchant views of the right knee were performed. COMPARISON: None FINDINGS: There is no fracture.  No dislocation.  Mild tricompartmental degenerative changes.     Mild degenerative changes.  No acute abnormality. Electronically signed by: Joshua Mcqueen Date:    06/05/2022 Time:    19:48         CC: Knee pain    46 y.o. Female who presents as a new patient to me for evaluation of  right knee pain.  She was riding a mechanical bull 3 weeks ago when she fell off, twisting her knee. States she heard a pop and had swelling after. She was seen in the ED and referred to ortho. She is in a knee immobilizer today. Reports her pain has eased some but still hurts. Twisting hurts. Also hurts she sleeps  Occupation: Teacher  Pain has been present for 3-weeks.  Injury description fell off a bull.   Mechanical symptoms, such as clicking, locking, and popping are present.    Swelling and effusions  are present.   The patient does  describe symptoms of knee instability, such as the knee buckling and the knee giving way.   Symptoms are worsened with activity.  Better with rest. Treatment thus far  has included rest, activity modifications, and oral medications.    she  has not had formal physical therapy.  she has not had prior injections injections into the knee.   she has not had previous advanced imaging such as MRI.     Here today to discuss diagnosis and treatment options.           REVIEW OF SYSTEMS:   Constitution: Negative. Negative for chills, fever and night sweats.    Hematologic/Lymphatic: Negative for bleeding problem. Does not bruise/bleed easily.   Skin: Negative for dry skin, itching and rash.   Musculoskeletal: Negative for falls. Positive for knee pain and muscle weakness.     All other review of symptoms were reviewed and found to be noncontributory.     PAST MEDICAL HISTORY:   Past Medical History:   Diagnosis Date    Allergy     Depression     Diabetes mellitus, type 2     Epigastric pain 2021    Hypertension     Sickle cell anemia     trait not anemia       PAST SURGICAL HISTORY:   Past Surgical History:   Procedure Laterality Date    APPENDECTOMY      CARPAL TUNNEL RELEASE      CARPAL TUNNEL RELEASE       SECTION      CHOLECYSTECTOMY      ESOPHAGOGASTRODUODENOSCOPY  2021    HYSTERECTOMY         FAMILY HISTORY:   Family History   Problem Relation Age of Onset    Hypertension Mother     Diabetes Mother     Hyperlipidemia Mother     Glaucoma Mother     Cancer Father        SOCIAL HISTORY:   Social History     Socioeconomic History    Marital status:     Number of children: 2   Tobacco Use    Smoking status: Never Smoker    Smokeless tobacco: Never Used   Substance and Sexual Activity    Alcohol use: Never    Drug use: Never    Sexual activity: Yes       MEDICATIONS:     Current Outpatient Medications:     citalopram (CELEXA) 20 MG tablet, Take 1 tablet (20 mg total) by mouth once daily., Disp: 90 tablet, Rfl: 3    gabapentin (NEURONTIN) 300 MG capsule, Take 1 capsule (300 mg total) by mouth once daily., Disp: 90 capsule, Rfl: 3     "hydrocodone-acetaminophen (HYCET) solution 7.5-325 mg/15mL, 1/2 to 1 tsp 4 times a day as needed for cough, Disp: 90 mL, Rfl: 0    insulin degludec (TRESIBA FLEXTOUCH U-100) 100 unit/mL (3 mL) InPn, Inject 60 Units into the skin nightly., Disp: , Rfl:     metFORMIN (GLUCOPHAGE) 500 MG tablet, Take 1 tablet (500 mg total) by mouth 2 (two) times daily with meals., Disp: 180 tablet, Rfl: 3    NOVOLOG FLEXPEN U-100 INSULIN 100 unit/mL (3 mL) InPn pen, Inject 40 Units into the skin 3 (three) times daily., Disp: , Rfl:     OZEMPIC 0.25 mg or 0.5 mg(2 mg/1.5 mL) pen injector, INJECT 0.5 MG SUBCUTANEOUS ONCE A WEEK, Disp: 1 pen, Rfl: 5    polyethylene glycol (GLYCOLAX) 17 gram PwPk, Take 17 g by mouth once daily., Disp: 10 packet, Rfl: 0    triamterene-hydrochlorothiazide 37.5-25 mg (MAXZIDE-25) 37.5-25 mg per tablet, Take 1 tablet by mouth once daily., Disp: 90 tablet, Rfl: 3    azithromycin (ZITHROMAX Z-TOÑA) 250 MG tablet, Two today then 1 daily (Patient not taking: No sig reported), Disp: 6 tablet, Rfl: 0    ondansetron (ZOFRAN) 4 MG tablet, Take 1 tablet (4 mg total) by mouth every 6 (six) hours. (Patient not taking: No sig reported), Disp: 12 tablet, Rfl: 0    pantoprazole (PROTONIX) 40 MG tablet, Take 1 tablet (40 mg total) by mouth once daily., Disp: 90 tablet, Rfl: 3    predniSONE (DELTASONE) 20 MG tablet, Take 1 tablet (20 mg total) by mouth once daily. (Patient not taking: No sig reported), Disp: 14 tablet, Rfl: 0    rOPINIRole (REQUIP) 0.25 MG tablet, Take 1 tablet (0.25 mg total) by mouth every evening., Disp: 30 tablet, Rfl: 3    ALLERGIES:   Review of patient's allergies indicates:   Allergen Reactions    Phenergan plain Other (See Comments)     Pt states feeling 'funny'        PHYSICAL EXAMINATION:  Ht 5' 8" (1.727 m)   Wt 103.9 kg (229 lb)   BMI 34.82 kg/m²   General    Constitutional: She is oriented to person, place, and time. She appears well-developed and well-nourished.   HENT:   Head: " Normocephalic and atraumatic.   Nose: Nose normal.   Eyes: EOM are normal. Pupils are equal, round, and reactive to light.   Cardiovascular: Normal rate and intact distal pulses.    Pulmonary/Chest: Effort normal. No respiratory distress. She exhibits no tenderness.   Abdominal: Soft. She exhibits no distension. There is no abdominal tenderness.   Neurological: She is alert and oriented to person, place, and time. She has normal reflexes.   Psychiatric: She has a normal mood and affect. Her behavior is normal. Judgment and thought content normal.           Right Knee Exam     Inspection   Swelling: present  Deformity: absent    Tenderness   The patient is tender to palpation of the medial retinaculum and medial joint line.    Crepitus   The patient has crepitus of the medial joint line and medial plica.    Range of Motion   Flexion: abnormal     Tests   Meniscus   Garry:  Medial - positive   Ligament Examination Lachman: normal (-1 to 2mm)   MCL - Valgus: normal (0 to 2mm)  LCL - Varus: normalDial Test at 30 degrees: normal (< 5 degrees)  Posterolateral Corner: unstable (>15 degrees difference)  Patella   Patellar apprehension: negative  Patellar Grind: positive    Other   Sensation: normal    Comments:  Pain with Thessaly Maneuver    Left Knee Exam   Left knee exam is normal.    Muscle Strength   Right Lower Extremity   Quadriceps:  5/5   Hamstrin/5     Reflexes     Right Side   Achilles:  2+  Quadriceps:  2+    Vascular Exam     Right Pulses  Dorsalis Pedis:      2+  Posterior Tibial:      2+              Orders Placed This Encounter   Procedures    MRI Knee Without Contrast Right     Standing Status:   Future     Standing Expiration Date:   2023     Order Specific Question:   Does the patient have a pacemaker or a defibrilator (Note: Some facilities may not be able to schedule an MRI for patients with pacemakers and defibrillators. You should contact your local radiology department to determine if  this is the case.)?     Answer:   No     Order Specific Question:   Does the patient have an aneurysm or surgical clip, pump, nerve/brain stimulator, middle/inner ear prosthesis, or other metal implant or foreign object (bullet, shrapnel)? If they have a card related to their implant, ask them to bring it. Issues related to the implant may cause the MRI to be delayed.     Answer:   No     Order Specific Question:   Is the patient claustrophobic?     Answer:   No     Order Specific Question:   Will the patient require sedation?     Answer:   No     Order Specific Question:   Does the patient have any of the following conditions? Diabetes, History of Renal Disease or Hypertension requiring medical therapy?     Answer:   Yes     Order Specific Question:   May the Radiologist modify the order per protocol to meet the clinical needs of the patient?     Answer:   Yes     Order Specific Question:   Is this part of a Research Study?     Answer:   No     Order Specific Question:   Does the patient have on a skin patch for medication with aluminized backing?     Answer:   No       Procedures

## 2022-07-04 NOTE — PROGRESS NOTES
Cole Herrera female  for   Chief Complaint   Patient presents with    Vaginal Itching     C/O vaginal itching, used monistat 7 and hydrocortisone cream, hasn't helped. This has been going on for 1 week.      OB History        2    Para   2    Term                AB        Living           SAB        IAB        Ectopic        Multiple        Live Births                          Past Medical History:   Diagnosis Date    Allergy     Depression     Diabetes mellitus, type 2     Epigastric pain 2021    Hypertension     Sickle cell anemia     trait not anemia      Past Surgical History:   Procedure Laterality Date    APPENDECTOMY      CARPAL TUNNEL RELEASE      CARPAL TUNNEL RELEASE       SECTION      CHOLECYSTECTOMY      ESOPHAGOGASTRODUODENOSCOPY  2021    HYSTERECTOMY        Review of patient's allergies indicates:   Allergen Reactions    Phenergan plain Other (See Comments)     Pt states feeling 'funny'             Physical exam:     General Appearance: healthy    Abdomen:Normal, benign.    Pelvic:   Vulva :  Normal vulva  Vagina: normal vagina  Uterus:  cervix is absent; adnexa not palpable    Rectal: not done  Extremity:normal    Skin: normal exam        Assessment:   Problem List Items Addressed This Visit    None     Visit Diagnoses     Vaginal itching    -  Primary           Plan:  The patient's vaginal pH is 4.5.  She was prescribed Flagyl.

## 2022-07-12 ENCOUNTER — HOSPITAL ENCOUNTER (OUTPATIENT)
Dept: RADIOLOGY | Facility: HOSPITAL | Age: 47
Discharge: HOME | End: 2022-07-12
Attending: OBSTETRICS & GYNECOLOGY
Payer: COMMERCIAL

## 2022-07-12 ENCOUNTER — OFFICE VISIT (OUTPATIENT)
Dept: OBSTETRICS AND GYNECOLOGY | Facility: CLINIC | Age: 47
End: 2022-07-12
Payer: COMMERCIAL

## 2022-07-12 VITALS
SYSTOLIC BLOOD PRESSURE: 124 MMHG | DIASTOLIC BLOOD PRESSURE: 83 MMHG | HEIGHT: 68 IN | WEIGHT: 229 LBS | BODY MASS INDEX: 34.71 KG/M2

## 2022-07-12 DIAGNOSIS — Z13.1 SCREENING FOR DIABETES MELLITUS: ICD-10-CM

## 2022-07-12 DIAGNOSIS — Z13.220 SCREENING FOR LIPOID DISORDERS: ICD-10-CM

## 2022-07-12 DIAGNOSIS — Z01.419 WOMEN'S ANNUAL ROUTINE GYNECOLOGICAL EXAMINATION: Primary | ICD-10-CM

## 2022-07-12 DIAGNOSIS — Z12.31 BREAST CANCER SCREENING BY MAMMOGRAM: ICD-10-CM

## 2022-07-12 DIAGNOSIS — Z12.72 SPECIAL SCREENING FOR MALIGNANT NEOPLASMS, VAGINA: ICD-10-CM

## 2022-07-12 PROCEDURE — 1159F PR MEDICATION LIST DOCUMENTED IN MEDICAL RECORD: ICD-10-PCS | Mod: ,,, | Performed by: OBSTETRICS & GYNECOLOGY

## 2022-07-12 PROCEDURE — 77067 SCR MAMMO BI INCL CAD: CPT | Mod: TC

## 2022-07-12 PROCEDURE — 88142 CYTOPATH C/V THIN LAYER: CPT | Mod: GCY | Performed by: OBSTETRICS & GYNECOLOGY

## 2022-07-12 PROCEDURE — 1160F PR REVIEW ALL MEDS BY PRESCRIBER/CLIN PHARMACIST DOCUMENTED: ICD-10-PCS | Mod: ,,, | Performed by: OBSTETRICS & GYNECOLOGY

## 2022-07-12 PROCEDURE — 3046F PR MOST RECENT HEMOGLOBIN A1C LEVEL > 9.0%: ICD-10-PCS | Mod: ,,, | Performed by: OBSTETRICS & GYNECOLOGY

## 2022-07-12 PROCEDURE — 3079F DIAST BP 80-89 MM HG: CPT | Mod: ,,, | Performed by: OBSTETRICS & GYNECOLOGY

## 2022-07-12 PROCEDURE — 3079F PR MOST RECENT DIASTOLIC BLOOD PRESSURE 80-89 MM HG: ICD-10-PCS | Mod: ,,, | Performed by: OBSTETRICS & GYNECOLOGY

## 2022-07-12 PROCEDURE — 99396 PR PREVENTIVE VISIT,EST,40-64: ICD-10-PCS | Mod: S$PBB,,, | Performed by: OBSTETRICS & GYNECOLOGY

## 2022-07-12 PROCEDURE — 1159F MED LIST DOCD IN RCRD: CPT | Mod: ,,, | Performed by: OBSTETRICS & GYNECOLOGY

## 2022-07-12 PROCEDURE — 1160F RVW MEDS BY RX/DR IN RCRD: CPT | Mod: ,,, | Performed by: OBSTETRICS & GYNECOLOGY

## 2022-07-12 PROCEDURE — 3074F SYST BP LT 130 MM HG: CPT | Mod: ,,, | Performed by: OBSTETRICS & GYNECOLOGY

## 2022-07-12 PROCEDURE — 99396 PREV VISIT EST AGE 40-64: CPT | Mod: S$PBB,,, | Performed by: OBSTETRICS & GYNECOLOGY

## 2022-07-12 PROCEDURE — 3046F HEMOGLOBIN A1C LEVEL >9.0%: CPT | Mod: ,,, | Performed by: OBSTETRICS & GYNECOLOGY

## 2022-07-12 PROCEDURE — 3008F BODY MASS INDEX DOCD: CPT | Mod: ,,, | Performed by: OBSTETRICS & GYNECOLOGY

## 2022-07-12 PROCEDURE — 99214 OFFICE O/P EST MOD 30 MIN: CPT | Mod: PBBFAC | Performed by: OBSTETRICS & GYNECOLOGY

## 2022-07-12 PROCEDURE — 3008F PR BODY MASS INDEX (BMI) DOCUMENTED: ICD-10-PCS | Mod: ,,, | Performed by: OBSTETRICS & GYNECOLOGY

## 2022-07-12 PROCEDURE — 3074F PR MOST RECENT SYSTOLIC BLOOD PRESSURE < 130 MM HG: ICD-10-PCS | Mod: ,,, | Performed by: OBSTETRICS & GYNECOLOGY

## 2022-07-13 LAB
GH SERPL-MCNC: NORMAL NG/ML
INSULIN SERPL-ACNC: NORMAL U[IU]/ML
LAB AP CLINICAL INFORMATION: NORMAL
LAB AP GYN INTERPRETATION: NEGATIVE
LAB AP PAP DISCLAIMER COMMENTS: NORMAL
RENIN PLAS-CCNC: NORMAL NG/ML/H

## 2022-07-14 NOTE — PROGRESS NOTES
Cole Herrera female  for   Chief Complaint   Patient presents with    Annual Exam     HEALTHY YOU-CU/PAP-HY LABS TODAY-NO COMPLAINTS      OB History        2    Para   2    Term   2            AB        Living           SAB        IAB        Ectopic        Multiple        Live Births                          Past Medical History:   Diagnosis Date    Allergy     Depression     Diabetes mellitus, type 2     Epigastric pain 2021    Hypertension     Sickle cell anemia     trait not anemia      Past Surgical History:   Procedure Laterality Date    APPENDECTOMY      CARPAL TUNNEL RELEASE      CARPAL TUNNEL RELEASE       SECTION      CHOLECYSTECTOMY      ESOPHAGOGASTRODUODENOSCOPY  2021    HYSTERECTOMY        Review of patient's allergies indicates:   Allergen Reactions    Phenergan plain Other (See Comments)     Pt states feeling 'funny'             Physical exam:     General Appearance: healthy    Chest:chest clear, no wheezing, rales, normal symmetric air entry, Heart exam - S1, S2 normal, no murmur, no gallop, rate regular    Breast:  normal appearance, no masses or tenderness    Abdomen:Normal, benign.    Pelvic:   Vulva :  Normal vulva  Vagina: normal vagina, no discharge, exudate, lesion, or erythema  Uterus:  cervix is absent; adnexa not palpable    Rectal: normal and Hemoccult negative  Extremity:normal    Skin: normal exam        Assessment:   Problem List Items Addressed This Visit    None     Visit Diagnoses     Women's annual routine gynecological examination    -  Primary    Special screening for malignant neoplasms, vagina        Relevant Orders    ThinPrep Pap Test (Completed)    Screening for lipoid disorders        Relevant Orders    Lipid Panel (Completed)    Screening for diabetes mellitus        Relevant Orders    Hemoglobin A1C (Completed)    Glucose, Random (Completed)           Plan:  She is going to schedule a mammogram.  She had a colonoscopy 3  years ago.  A Pap smear and healthy U labs were done today.

## 2022-07-27 ENCOUNTER — HOSPITAL ENCOUNTER (EMERGENCY)
Facility: HOSPITAL | Age: 47
Discharge: HOME | End: 2022-07-27
Payer: COMMERCIAL

## 2022-07-27 VITALS
BODY MASS INDEX: 34.71 KG/M2 | HEIGHT: 68 IN | WEIGHT: 229 LBS | OXYGEN SATURATION: 98 % | HEART RATE: 88 BPM | TEMPERATURE: 99 F | SYSTOLIC BLOOD PRESSURE: 130 MMHG | DIASTOLIC BLOOD PRESSURE: 84 MMHG | RESPIRATION RATE: 16 BRPM
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
## 2022-07-27 DIAGNOSIS — S93.402A SPRAIN OF LEFT ANKLE, UNSPECIFIED LIGAMENT, INITIAL ENCOUNTER: Primary | ICD-10-CM

## 2022-07-27 PROCEDURE — 99283 PR EMERGENCY DEPT VISIT,LEVEL III: ICD-10-PCS | Mod: ,,, | Performed by: NURSE PRACTITIONER

## 2022-07-27 PROCEDURE — 96372 THER/PROPH/DIAG INJ SC/IM: CPT

## 2022-07-27 PROCEDURE — 99284 EMERGENCY DEPT VISIT MOD MDM: CPT

## 2022-07-27 PROCEDURE — 99283 EMERGENCY DEPT VISIT LOW MDM: CPT | Mod: ,,, | Performed by: NURSE PRACTITIONER

## 2022-07-27 PROCEDURE — 63600175 PHARM REV CODE 636 W HCPCS: Performed by: NURSE PRACTITIONER

## 2022-07-27 RX ORDER — ONDANSETRON 2 MG/ML
4 INJECTION INTRAMUSCULAR; INTRAVENOUS
Status: COMPLETED | OUTPATIENT
Start: 2022-07-27 | End: 2022-07-27

## 2022-07-27 RX ORDER — HYDROCODONE BITARTRATE AND ACETAMINOPHEN 5; 325 MG/1; MG/1
1 TABLET ORAL EVERY 6 HOURS PRN
Qty: 18 TABLET | Refills: 0 | Status: SHIPPED | OUTPATIENT
Start: 2022-07-27 | End: 2023-01-19

## 2022-07-27 RX ORDER — MEPERIDINE HYDROCHLORIDE 25 MG/ML
25 INJECTION INTRAMUSCULAR; INTRAVENOUS; SUBCUTANEOUS
Status: COMPLETED | OUTPATIENT
Start: 2022-07-27 | End: 2022-07-27

## 2022-07-27 RX ADMIN — ONDANSETRON 4 MG: 2 INJECTION INTRAMUSCULAR; INTRAVENOUS at 03:07

## 2022-07-27 RX ADMIN — MEPERIDINE HYDROCHLORIDE 25 MG: 25 INJECTION INTRAMUSCULAR; INTRAVENOUS; SUBCUTANEOUS at 03:07

## 2022-07-27 NOTE — DISCHARGE INSTRUCTIONS
Use prescriptions as directed. You may alternate with ibuprofen as needed for pain as well. Nonweight bearing for 2 weeks. Follow up with primary care provider in 1 week for recheck and continued care and management. Return to the ED for worsening signs and symptoms or otherwise as needed.

## 2022-07-27 NOTE — ED PROVIDER NOTES
Encounter Date: 2022       History     Chief Complaint   Patient presents with    Fall    Ankle Pain     45 y/o AAF with PMH of HTN, DM presents with c/o left ankle pain after she had a trip and fall this AM. States she initially was able to put some weight on it but was painful; however, as the day progressed the pain has worsened and her ankle has began to swell. She did apply ice prior to coming to the ED.     The history is provided by the patient.     Review of patient's allergies indicates:   Allergen Reactions    Phenergan plain Other (See Comments)     Pt states feeling 'funny'     Past Medical History:   Diagnosis Date    Allergy     Depression     Diabetes mellitus, type 2     Epigastric pain 2021    Hypertension     Sickle cell anemia     trait not anemia     Past Surgical History:   Procedure Laterality Date    APPENDECTOMY      CARPAL TUNNEL RELEASE      CARPAL TUNNEL RELEASE       SECTION      CHOLECYSTECTOMY      ESOPHAGOGASTRODUODENOSCOPY  2021    HYSTERECTOMY       Family History   Problem Relation Age of Onset    Hypertension Mother     Diabetes Mother     Hyperlipidemia Mother     Glaucoma Mother     Cancer Father      Social History     Tobacco Use    Smoking status: Never Smoker    Smokeless tobacco: Never Used   Substance Use Topics    Alcohol use: Never    Drug use: Never     Review of Systems   Constitutional: Negative for chills and fever.   Musculoskeletal: Positive for arthralgias and joint swelling.   All other systems reviewed and are negative.      Physical Exam     Initial Vitals [22 1438]   BP Pulse Resp Temp SpO2   130/84 88 16 98.8 °F (37.1 °C) 98 %      MAP       --         Physical Exam    Constitutional: She appears well-developed and well-nourished. She is cooperative.   Cardiovascular: Normal rate, regular rhythm, normal heart sounds and normal pulses.   Pulmonary/Chest: Effort normal and breath sounds normal.   Abdominal:  Abdomen is soft. Bowel sounds are normal. There is no abdominal tenderness.   Musculoskeletal:      Right ankle: Normal.      Left ankle: Swelling present. Tenderness present over the lateral malleolus. Decreased range of motion. Normal pulse.     Neurological: She is alert and oriented to person, place, and time.   Skin: Skin is warm, dry and intact. Capillary refill takes less than 2 seconds.   Psychiatric: She has a normal mood and affect. Her speech is normal and behavior is normal. Judgment and thought content normal. Cognition and memory are normal.         Medical Screening Exam   See Full Note    ED Course   Procedures  Labs Reviewed - No data to display       Imaging Results          X-Ray Ankle Complete Left (Final result)  Result time 07/27/22 14:57:38    Final result by Randy Alex DO (07/27/22 14:57:38)                 Impression:      No acute findings      Electronically signed by: Randy Alex  Date:    07/27/2022  Time:    14:57             Narrative:    EXAMINATION:  XR ANKLE COMPLETE 3 VIEW LEFT    CLINICAL HISTORY:  Unspecified fall, initial encounter    TECHNIQUE:  XR ANKLE COMPLETE 3 VIEW LEFT    COMPARISON:  2019    FINDINGS:  No acute fracture or dislocation.    No joint abnormality.    No radiopaque foreign bodies.                                 Medications   meperidine (PF) injection 25 mg (has no administration in time range)   ondansetron injection 4 mg (has no administration in time range)                   Counseled on supportive measures. Warning s/s discussed and return precautions given; the patient has v/u.      Clinical Impression:   Final diagnoses:  [W19.XXXA] Fall  [S93.402A] Sprain of left ankle, unspecified ligament, initial encounter (Primary)          ED Disposition Condition    Discharge Stable        ED Prescriptions     Medication Sig Dispense Start Date End Date Auth. Provider    HYDROcodone-acetaminophen (NORCO) 5-325 mg per tablet Take 1 tablet by mouth  every 6 (six) hours as needed for Pain. 18 tablet 7/27/2022  LACEY Winston        Follow-up Information     Follow up With Specialties Details Why Contact Info    Shaun Capone, DO Critical Care Medicine In 1 week  [address]  [place] [address]  [phone]             LACEY Winston  07/27/22 1502

## 2022-11-05 ENCOUNTER — OFFICE VISIT (OUTPATIENT)
Dept: FAMILY MEDICINE | Facility: CLINIC | Age: 47
End: 2022-11-05
Payer: COMMERCIAL

## 2022-11-05 VITALS
OXYGEN SATURATION: 99 % | BODY MASS INDEX: 34.71 KG/M2 | RESPIRATION RATE: 18 BRPM | WEIGHT: 229 LBS | TEMPERATURE: 99 F | SYSTOLIC BLOOD PRESSURE: 129 MMHG | HEART RATE: 96 BPM | HEIGHT: 68 IN | DIASTOLIC BLOOD PRESSURE: 95 MMHG

## 2022-11-05 DIAGNOSIS — Z11.52 ENCOUNTER FOR SCREENING LABORATORY TESTING FOR COVID-19 VIRUS: ICD-10-CM

## 2022-11-05 DIAGNOSIS — J32.9 SINUSITIS, UNSPECIFIED CHRONICITY, UNSPECIFIED LOCATION: Primary | ICD-10-CM

## 2022-11-05 LAB
CTP QC/QA: YES
FLUAV AG NPH QL: NEGATIVE
FLUBV AG NPH QL: NEGATIVE
SARS-COV-2 AG RESP QL IA.RAPID: NEGATIVE

## 2022-11-05 PROCEDURE — 3074F PR MOST RECENT SYSTOLIC BLOOD PRESSURE < 130 MM HG: ICD-10-PCS | Mod: ,,, | Performed by: NURSE PRACTITIONER

## 2022-11-05 PROCEDURE — 3046F HEMOGLOBIN A1C LEVEL >9.0%: CPT | Mod: ,,, | Performed by: NURSE PRACTITIONER

## 2022-11-05 PROCEDURE — 1160F PR REVIEW ALL MEDS BY PRESCRIBER/CLIN PHARMACIST DOCUMENTED: ICD-10-PCS | Mod: ,,, | Performed by: NURSE PRACTITIONER

## 2022-11-05 PROCEDURE — 3080F DIAST BP >= 90 MM HG: CPT | Mod: ,,, | Performed by: NURSE PRACTITIONER

## 2022-11-05 PROCEDURE — 3046F PR MOST RECENT HEMOGLOBIN A1C LEVEL > 9.0%: ICD-10-PCS | Mod: ,,, | Performed by: NURSE PRACTITIONER

## 2022-11-05 PROCEDURE — 3080F PR MOST RECENT DIASTOLIC BLOOD PRESSURE >= 90 MM HG: ICD-10-PCS | Mod: ,,, | Performed by: NURSE PRACTITIONER

## 2022-11-05 PROCEDURE — 99051 PR MEDICAL SERVICES, EVE/WKEND/HOLIDAY: ICD-10-PCS | Mod: ,,, | Performed by: NURSE PRACTITIONER

## 2022-11-05 PROCEDURE — 3008F PR BODY MASS INDEX (BMI) DOCUMENTED: ICD-10-PCS | Mod: ,,, | Performed by: NURSE PRACTITIONER

## 2022-11-05 PROCEDURE — 87428 SARSCOV & INF VIR A&B AG IA: CPT | Mod: QW,,, | Performed by: NURSE PRACTITIONER

## 2022-11-05 PROCEDURE — 99051 MED SERV EVE/WKEND/HOLIDAY: CPT | Mod: ,,, | Performed by: NURSE PRACTITIONER

## 2022-11-05 PROCEDURE — 3008F BODY MASS INDEX DOCD: CPT | Mod: ,,, | Performed by: NURSE PRACTITIONER

## 2022-11-05 PROCEDURE — 1159F MED LIST DOCD IN RCRD: CPT | Mod: ,,, | Performed by: NURSE PRACTITIONER

## 2022-11-05 PROCEDURE — 99213 PR OFFICE/OUTPT VISIT, EST, LEVL III, 20-29 MIN: ICD-10-PCS | Mod: ,,, | Performed by: NURSE PRACTITIONER

## 2022-11-05 PROCEDURE — 99213 OFFICE O/P EST LOW 20 MIN: CPT | Mod: ,,, | Performed by: NURSE PRACTITIONER

## 2022-11-05 PROCEDURE — 87428 POCT SARS-COV2 (COVID) WITH FLU ANTIGEN: ICD-10-PCS | Mod: QW,,, | Performed by: NURSE PRACTITIONER

## 2022-11-05 PROCEDURE — 3074F SYST BP LT 130 MM HG: CPT | Mod: ,,, | Performed by: NURSE PRACTITIONER

## 2022-11-05 PROCEDURE — 1159F PR MEDICATION LIST DOCUMENTED IN MEDICAL RECORD: ICD-10-PCS | Mod: ,,, | Performed by: NURSE PRACTITIONER

## 2022-11-05 PROCEDURE — 1160F RVW MEDS BY RX/DR IN RCRD: CPT | Mod: ,,, | Performed by: NURSE PRACTITIONER

## 2022-11-05 RX ORDER — CEFDINIR 300 MG/1
300 CAPSULE ORAL 2 TIMES DAILY
Qty: 20 CAPSULE | Refills: 0 | Status: SHIPPED | OUTPATIENT
Start: 2022-11-05 | End: 2022-11-15

## 2022-11-05 RX ORDER — DEXCHLORPHENIRAMINE MALEATE, DEXTROMETHORPHAN HBR, PHENYLEPHRINE HCL 1; 10; 5 MG/5ML; MG/5ML; MG/5ML
10 SYRUP ORAL EVERY 6 HOURS PRN
Qty: 120 ML | Refills: 0 | Status: SHIPPED | OUTPATIENT
Start: 2022-11-05

## 2022-11-05 NOTE — PROGRESS NOTES
"Subjective:       Patient ID: Cole Herrera is a 46 y.o. female.    Chief Complaint: Nasal Congestion (Nasal congestion, chills, body aches, headaches, and coughing started Wednesday)    Presents to clinic as above. No fever.     Review of Systems   Constitutional:  Positive for malaise/fatigue. Negative for chills and fever.   HENT:  Positive for congestion and sinus pain. Negative for ear pain.    Respiratory:  Positive for cough.    Cardiovascular: Negative.    Musculoskeletal:  Positive for myalgias.   Neurological:  Positive for headaches.        Reviewed family, medical, surgical, and social history.    Objective:      BP (!) 129/95   Pulse 96   Temp 98.6 °F (37 °C)   Resp 18   Ht 5' 8" (1.727 m)   Wt 103.9 kg (229 lb)   SpO2 99%   BMI 34.82 kg/m²   Physical Exam  Vitals and nursing note reviewed.   Constitutional:       Appearance: Normal appearance.   HENT:      Head: Normocephalic and atraumatic.      Right Ear: Hearing, tympanic membrane, ear canal and external ear normal.      Left Ear: Hearing, tympanic membrane, ear canal and external ear normal.      Nose: Nasal tenderness, mucosal edema, congestion and rhinorrhea present. Rhinorrhea is purulent.      Right Turbinates: Enlarged and swollen.      Left Turbinates: Enlarged and swollen.      Right Sinus: Maxillary sinus tenderness and frontal sinus tenderness present.      Left Sinus: Maxillary sinus tenderness and frontal sinus tenderness present.      Mouth/Throat:      Mouth: Mucous membranes are moist.   Cardiovascular:      Rate and Rhythm: Normal rate and regular rhythm.      Heart sounds: Normal heart sounds.   Pulmonary:      Effort: Pulmonary effort is normal.      Breath sounds: Normal breath sounds.   Skin:     General: Skin is warm and dry.   Neurological:      Mental Status: She is alert.   Psychiatric:         Mood and Affect: Mood normal.         Behavior: Behavior normal.         Thought Content: Thought content normal.         " Judgment: Judgment normal.          Office Visit on 11/05/2022   Component Date Value Ref Range Status    SARS Coronavirus 2 Antigen 11/05/2022 Negative  Negative Final    Rapid Influenza A Ag 11/05/2022 Negative  Negative Final    Rapid Influenza B Ag 11/05/2022 Negative  Negative Final     Acceptable 11/05/2022 Yes   Final      Assessment:       1. Sinusitis, unspecified chronicity, unspecified location    2. Encounter for screening laboratory testing for COVID-19 virus          Plan:       Sinusitis, unspecified chronicity, unspecified location  -     cefdinir (OMNICEF) 300 MG capsule; Take 1 capsule (300 mg total) by mouth 2 (two) times daily. for 10 days  Dispense: 20 capsule; Refill: 0  -     dexchlorphen-phenylephrine-DM (POLYTUSSIN DM) 1-5-10 mg/5 mL Syrp; Take 10 mLs by mouth every 6 (six) hours as needed (cough/congestion).  Dispense: 120 mL; Refill: 0    Encounter for screening laboratory testing for COVID-19 virus  -     POCT SARS-COV2 (COVID) with Flu Antigen  Retest PRN  RTC PRN          Risks, benefits, and side effects were discussed with the patient. All questions were answered to the fullest satisfaction of the patient, and pt verbalized understanding and agreement to treatment plan. Pt was to call with any new or worsening symptoms, or present to the ER.

## 2022-11-05 NOTE — LETTER
November 5, 2022      [facility]  [address] [address]  Phone: [phone]  Fax: [phone]       Patient: Cole Herrera   YOB: 1975  Date of Visit: 11/05/2022    To Whom It May Concern:    Wilfredo Herrera  was at [facility] on 11/05/2022. The patient may return to work Tuesday 11/08/2022 with no restrictions. If you have any questions or concerns, or if I can be of further assistance, please do not hesitate to contact me.    Sincerely,       Claudia RAHMAN/ FRANKY Barker LPN

## 2022-11-14 RX ORDER — METFORMIN HYDROCHLORIDE 500 MG/1
TABLET ORAL
Qty: 180 TABLET | Refills: 3 | Status: SHIPPED | OUTPATIENT
Start: 2022-11-14 | End: 2023-09-14

## 2023-01-19 ENCOUNTER — OFFICE VISIT (OUTPATIENT)
Dept: INTERNAL MEDICINE | Facility: CLINIC | Age: 48
End: 2023-01-19
Payer: COMMERCIAL

## 2023-01-19 VITALS
DIASTOLIC BLOOD PRESSURE: 76 MMHG | TEMPERATURE: 98 F | BODY MASS INDEX: 32.28 KG/M2 | SYSTOLIC BLOOD PRESSURE: 129 MMHG | WEIGHT: 213 LBS | OXYGEN SATURATION: 98 % | HEIGHT: 68 IN | RESPIRATION RATE: 18 BRPM | HEART RATE: 99 BPM

## 2023-01-19 DIAGNOSIS — E66.9 OBESITY, UNSPECIFIED CLASSIFICATION, UNSPECIFIED OBESITY TYPE, UNSPECIFIED WHETHER SERIOUS COMORBIDITY PRESENT: ICD-10-CM

## 2023-01-19 DIAGNOSIS — E78.5 HYPERLIPIDEMIA, UNSPECIFIED HYPERLIPIDEMIA TYPE: ICD-10-CM

## 2023-01-19 DIAGNOSIS — Z09 FOLLOW-UP EXAM: Primary | ICD-10-CM

## 2023-01-19 DIAGNOSIS — M25.511 ACUTE PAIN OF RIGHT SHOULDER: ICD-10-CM

## 2023-01-19 DIAGNOSIS — Z79.4 TYPE 2 DIABETES MELLITUS WITHOUT COMPLICATION, WITH LONG-TERM CURRENT USE OF INSULIN: ICD-10-CM

## 2023-01-19 DIAGNOSIS — E11.9 TYPE 2 DIABETES MELLITUS WITHOUT COMPLICATION, WITH LONG-TERM CURRENT USE OF INSULIN: ICD-10-CM

## 2023-01-19 DIAGNOSIS — F32.A DEPRESSION, UNSPECIFIED DEPRESSION TYPE: ICD-10-CM

## 2023-01-19 DIAGNOSIS — F41.9 ANXIETY: ICD-10-CM

## 2023-01-19 DIAGNOSIS — R53.83 FATIGUE, UNSPECIFIED TYPE: ICD-10-CM

## 2023-01-19 DIAGNOSIS — E11.9 TYPE 2 DIABETES MELLITUS WITHOUT COMPLICATION, WITHOUT LONG-TERM CURRENT USE OF INSULIN: ICD-10-CM

## 2023-01-19 DIAGNOSIS — D57.3 SICKLE CELL TRAIT: ICD-10-CM

## 2023-01-19 DIAGNOSIS — K76.0 FATTY LIVER: ICD-10-CM

## 2023-01-19 DIAGNOSIS — G25.81 RESTLESS LEGS: ICD-10-CM

## 2023-01-19 DIAGNOSIS — K21.9 GASTROESOPHAGEAL REFLUX DISEASE, UNSPECIFIED WHETHER ESOPHAGITIS PRESENT: ICD-10-CM

## 2023-01-19 DIAGNOSIS — I10 ESSENTIAL HYPERTENSION: ICD-10-CM

## 2023-01-19 PROCEDURE — 99215 OFFICE O/P EST HI 40 MIN: CPT | Mod: PBBFAC | Performed by: INTERNAL MEDICINE

## 2023-01-19 PROCEDURE — 3008F BODY MASS INDEX DOCD: CPT | Mod: ,,, | Performed by: INTERNAL MEDICINE

## 2023-01-19 PROCEDURE — 3046F HEMOGLOBIN A1C LEVEL >9.0%: CPT | Mod: ,,, | Performed by: INTERNAL MEDICINE

## 2023-01-19 PROCEDURE — 3074F PR MOST RECENT SYSTOLIC BLOOD PRESSURE < 130 MM HG: ICD-10-PCS | Mod: ,,, | Performed by: INTERNAL MEDICINE

## 2023-01-19 PROCEDURE — 3078F PR MOST RECENT DIASTOLIC BLOOD PRESSURE < 80 MM HG: ICD-10-PCS | Mod: ,,, | Performed by: INTERNAL MEDICINE

## 2023-01-19 PROCEDURE — 3078F DIAST BP <80 MM HG: CPT | Mod: ,,, | Performed by: INTERNAL MEDICINE

## 2023-01-19 PROCEDURE — 1159F PR MEDICATION LIST DOCUMENTED IN MEDICAL RECORD: ICD-10-PCS | Mod: ,,, | Performed by: INTERNAL MEDICINE

## 2023-01-19 PROCEDURE — 3046F PR MOST RECENT HEMOGLOBIN A1C LEVEL > 9.0%: ICD-10-PCS | Mod: ,,, | Performed by: INTERNAL MEDICINE

## 2023-01-19 PROCEDURE — 99215 OFFICE O/P EST HI 40 MIN: CPT | Mod: S$PBB,25,, | Performed by: INTERNAL MEDICINE

## 2023-01-19 PROCEDURE — 1159F MED LIST DOCD IN RCRD: CPT | Mod: ,,, | Performed by: INTERNAL MEDICINE

## 2023-01-19 PROCEDURE — 99215 PR OFFICE/OUTPT VISIT, EST, LEVL V, 40-54 MIN: ICD-10-PCS | Mod: S$PBB,25,, | Performed by: INTERNAL MEDICINE

## 2023-01-19 PROCEDURE — 3074F SYST BP LT 130 MM HG: CPT | Mod: ,,, | Performed by: INTERNAL MEDICINE

## 2023-01-19 PROCEDURE — 96372 THER/PROPH/DIAG INJ SC/IM: CPT | Mod: PBBFAC | Performed by: INTERNAL MEDICINE

## 2023-01-19 PROCEDURE — 3008F PR BODY MASS INDEX (BMI) DOCUMENTED: ICD-10-PCS | Mod: ,,, | Performed by: INTERNAL MEDICINE

## 2023-01-19 RX ORDER — KETOROLAC TROMETHAMINE 10 MG/1
10 TABLET, FILM COATED ORAL EVERY 6 HOURS
Qty: 20 TABLET | Refills: 0 | Status: SHIPPED | OUTPATIENT
Start: 2023-01-19 | End: 2023-01-24

## 2023-01-19 RX ORDER — CYANOCOBALAMIN 1000 UG/ML
1000 INJECTION, SOLUTION INTRAMUSCULAR; SUBCUTANEOUS
Status: COMPLETED | OUTPATIENT
Start: 2023-01-19 | End: 2023-01-19

## 2023-01-19 RX ORDER — KETOROLAC TROMETHAMINE 30 MG/ML
60 INJECTION, SOLUTION INTRAMUSCULAR; INTRAVENOUS ONCE
Status: COMPLETED | OUTPATIENT
Start: 2023-01-19 | End: 2023-01-19

## 2023-01-19 RX ADMIN — KETOROLAC TROMETHAMINE 60 MG: 30 INJECTION, SOLUTION INTRAMUSCULAR; INTRAVENOUS at 11:01

## 2023-01-19 RX ADMIN — CYANOCOBALAMIN 1000 MCG: 1000 INJECTION, SOLUTION INTRAMUSCULAR at 11:01

## 2023-01-19 NOTE — PROGRESS NOTES
Subjective:       Patient ID: Cole Herrera is a 47 y.o. female.    Chief Complaint: Follow-up (C/o right sided body pain x2 months), Fatigue, and Nausea (Every morning)    The patient is a 45-year-old  female the presents today to establish care.  She has a history of hypertension, diabetes, GERD, and non alcoholic fatty liver disease.  She was recently started on the Ozempic and states that she has not been taking her insulin since then.  This is due to her fasting blood sugars being in the low 100s to 120s.  Only complaint today is of some leg jerks while sleeping.  She states that it is severe enough that it prevents her from being able to get a good night's rest.  It does not happen all the time.  Today she is resting comfortably in no distress.  She is afebrile and vital signs are stable.    1/19/23-the patient presents today for follow-up.  Over the last couple of months she is been dealing with some myalgias and arthralgias.  Mainly confined to the right shoulder.  She denies any injury.  She states that has progressively worsened and is made worse with movements.  Open can test is positive on exam.  Her blood pressure looks good today.  It is 129/76.  She has not been routinely checking her blood sugars.  Her last A1c was 10.7%.  She also complains of fatigue and just feels like she is tired all the time.  Today she is resting comfortably in no distress.  She is afebrile and vital signs are stable.    Follow-up  Associated symptoms include arthralgias, fatigue, nausea and weakness. Pertinent negatives include no abdominal pain, chest pain, chills, congestion, coughing, fever, headaches, joint swelling, myalgias, neck pain, rash, sore throat or vomiting.   Fatigue  Associated symptoms include arthralgias, fatigue, nausea and weakness. Pertinent negatives include no abdominal pain, chest pain, chills, congestion, coughing, fever, headaches, joint swelling, myalgias, neck pain, rash, sore  throat or vomiting.   Nausea  Associated symptoms include arthralgias, fatigue, nausea and weakness. Pertinent negatives include no abdominal pain, chest pain, chills, congestion, coughing, fever, headaches, joint swelling, myalgias, neck pain, rash, sore throat or vomiting.   Review of Systems   Constitutional:  Positive for activity change, fatigue and unexpected weight change. Negative for appetite change, chills and fever.   HENT:  Negative for nasal congestion, ear pain, hearing loss, rhinorrhea, sinus pressure/congestion, sore throat and trouble swallowing.    Eyes:  Negative for pain, discharge, redness and visual disturbance.   Respiratory:  Negative for apnea, cough, chest tightness, shortness of breath and wheezing.    Cardiovascular:  Negative for chest pain and palpitations.   Gastrointestinal:  Positive for diarrhea and nausea. Negative for abdominal pain, blood in stool, constipation and vomiting.   Endocrine: Negative for cold intolerance, heat intolerance, polydipsia and polyuria.   Genitourinary:  Negative for difficulty urinating, dysuria, hematuria and menstrual problem.   Musculoskeletal:  Positive for arthralgias. Negative for back pain, joint swelling, myalgias and neck pain.   Integumentary:  Negative for pallor, rash and wound.   Allergic/Immunologic: Negative for immunocompromised state.   Neurological:  Positive for weakness. Negative for tremors, seizures, headaches and memory loss.   Hematological:  Negative for adenopathy.   Psychiatric/Behavioral:  Negative for confusion, dysphoric mood and sleep disturbance. The patient is not nervous/anxious.        Objective:      Physical Exam  Vitals and nursing note reviewed.   Constitutional:       General: She is not in acute distress.     Appearance: Normal appearance. She is obese. She is not ill-appearing.   HENT:      Head: Normocephalic and atraumatic.      Right Ear: External ear normal.      Left Ear: External ear normal.      Nose: Nose  normal.      Mouth/Throat:      Pharynx: Oropharynx is clear.   Eyes:      Extraocular Movements: Extraocular movements intact.      Conjunctiva/sclera: Conjunctivae normal.      Pupils: Pupils are equal, round, and reactive to light.   Neck:      Vascular: No carotid bruit.   Cardiovascular:      Rate and Rhythm: Normal rate and regular rhythm.      Pulses: Normal pulses.      Heart sounds: Normal heart sounds. No murmur heard.  Pulmonary:      Effort: No respiratory distress.      Breath sounds: Normal breath sounds. No wheezing or rales.   Abdominal:      General: Bowel sounds are normal.      Palpations: Abdomen is soft.   Musculoskeletal:         General: Tenderness present. Normal range of motion.      Cervical back: Normal range of motion and neck supple.      Right lower leg: No edema.      Left lower leg: No edema.   Skin:     General: Skin is warm and dry.      Capillary Refill: Capillary refill takes less than 2 seconds.      Coloration: Skin is not jaundiced or pale.   Neurological:      General: No focal deficit present.      Mental Status: She is alert and oriented to person, place, and time.      Cranial Nerves: No cranial nerve deficit.      Sensory: No sensory deficit.   Psychiatric:         Mood and Affect: Mood normal.         Judgment: Judgment normal.       Assessment:       1. Follow-up exam    2. Type 2 diabetes mellitus without complication, with long-term current use of insulin    3. Restless legs    4. Anxiety    5. Depression, unspecified depression type    6. Essential hypertension    7. Type 2 diabetes mellitus without complication, without long-term current use of insulin    8. Obesity, unspecified classification, unspecified obesity type, unspecified whether serious comorbidity present    9. Gastroesophageal reflux disease, unspecified whether esophagitis present    10. Fatty liver    11. Hyperlipidemia, unspecified hyperlipidemia type    12. Acute pain of right shoulder    13.  Fatigue, unspecified type    14. Sickle cell trait        Plan:       1. Patient is here t for follow-up.  She did not keep follow-up last time    2. Essential hypertension-blood pressure looks good.  It is 129/76.  Continue with current care    3. Diabetes mellitus type 2----poorly controlled-----the patient's home medications include Ozempic, metformin, Tresiba, and NovoLog.  She states that since she started the Ozempic she has not required any insulin.  She states her blood sugars have been running in the low 100s to 120s.  We are going to check a hemoglobin A1c.  Eye exam and foot exam are up-to-date.  1/19/23-she is noncompliant with diet medications.  We are checking an A1c today.  Last A1c was 10.7%.  We will follow-up and shape therapy accordingly.  I have counseled her on the importance of compliance with medications, appointments, and diet.    4. GERD-stable.  Continue with PPI    5. Restless legs-recently had iron studies which were within normal limits.  Which she describes also could be hypnic jerks.  We are going to try low dose of Requip at bedtime to see if this will help----the Requip has helped.    6.  non alcoholic fatty liver disease-she has been seen by Dr. Ferrera. Ultrasound scheduled for tomorrow.  All other testing has been within normal limits.  She is on appropriate therapy with the Ozempic.  She is losing weight.  She is down from about 250 lb to 236. She abstains from heavy alcohol use.  We are going to check a CMP today    7. Right shoulder pain-consistent with injury to the rotator cuff.  We are going to give her a Toradol injection today and set her up with physical therapy.    8. History of sickle cell trait-she thinks that this is accurate.  We are going to check a hemoglobin electrophoresis    9. Fatigue-multifactorial and in part related to uncontrolled blood sugars.  I have explained to her that very rarely we find a medical reason for fatigue.  However we will check a B12 and  folate, a CBC, CMP, TSH, and free T4    Return to care in 3 months

## 2023-05-17 ENCOUNTER — PATIENT MESSAGE (OUTPATIENT)
Dept: INTERNAL MEDICINE | Facility: CLINIC | Age: 48
End: 2023-05-17
Payer: COMMERCIAL

## 2023-05-23 ENCOUNTER — TELEPHONE (OUTPATIENT)
Dept: INTERNAL MEDICINE | Facility: CLINIC | Age: 48
End: 2023-05-23
Payer: COMMERCIAL

## 2023-05-23 NOTE — TELEPHONE ENCOUNTER
Spoke to pt on the phone and let her know that I am going to try and do the appeal for the ozempic. She thanked me. I explained I will call her once I have an update

## 2023-05-26 ENCOUNTER — HOSPITAL ENCOUNTER (EMERGENCY)
Facility: HOSPITAL | Age: 48
Discharge: HOME | End: 2023-05-27
Attending: EMERGENCY MEDICINE
Payer: COMMERCIAL

## 2023-05-26 DIAGNOSIS — R07.9 CHEST PAIN: Primary | ICD-10-CM

## 2023-05-26 PROCEDURE — 93010 EKG 12-LEAD: ICD-10-PCS | Mod: ,,, | Performed by: STUDENT IN AN ORGANIZED HEALTH CARE EDUCATION/TRAINING PROGRAM

## 2023-05-26 PROCEDURE — 99284 EMERGENCY DEPT VISIT MOD MDM: CPT | Mod: ,,, | Performed by: EMERGENCY MEDICINE

## 2023-05-26 PROCEDURE — 99284 PR EMERGENCY DEPT VISIT,LEVEL IV: ICD-10-PCS | Mod: ,,, | Performed by: EMERGENCY MEDICINE

## 2023-05-26 PROCEDURE — 93010 ELECTROCARDIOGRAM REPORT: CPT | Mod: ,,, | Performed by: STUDENT IN AN ORGANIZED HEALTH CARE EDUCATION/TRAINING PROGRAM

## 2023-05-26 PROCEDURE — 93005 ELECTROCARDIOGRAM TRACING: CPT

## 2023-05-26 PROCEDURE — 99285 EMERGENCY DEPT VISIT HI MDM: CPT

## 2023-05-27 VITALS
DIASTOLIC BLOOD PRESSURE: 85 MMHG | SYSTOLIC BLOOD PRESSURE: 128 MMHG | TEMPERATURE: 98 F | HEIGHT: 68 IN | OXYGEN SATURATION: 100 % | HEART RATE: 96 BPM | WEIGHT: 221 LBS | BODY MASS INDEX: 33.49 KG/M2 | RESPIRATION RATE: 14 BRPM

## 2023-05-27 LAB
ALBUMIN SERPL BCP-MCNC: 3.5 G/DL (ref 3.5–5)
ALBUMIN/GLOB SERPL: 0.9 {RATIO}
ALP SERPL-CCNC: 109 U/L (ref 39–100)
ALT SERPL W P-5'-P-CCNC: 73 U/L (ref 13–56)
ANION GAP SERPL CALCULATED.3IONS-SCNC: 8 MMOL/L (ref 7–16)
AST SERPL W P-5'-P-CCNC: 12 U/L (ref 15–37)
BASOPHILS # BLD AUTO: 0.04 K/UL (ref 0–0.2)
BASOPHILS NFR BLD AUTO: 0.3 % (ref 0–1)
BILIRUB SERPL-MCNC: 0.4 MG/DL (ref ?–1.2)
BUN SERPL-MCNC: 8 MG/DL (ref 7–18)
BUN/CREAT SERPL: 9 (ref 6–20)
CALCIUM SERPL-MCNC: 9.4 MG/DL (ref 8.5–10.1)
CHLORIDE SERPL-SCNC: 97 MMOL/L (ref 98–107)
CO2 SERPL-SCNC: 33 MMOL/L (ref 21–32)
CREAT SERPL-MCNC: 0.86 MG/DL (ref 0.55–1.02)
DIFFERENTIAL METHOD BLD: ABNORMAL
EGFR (NO RACE VARIABLE) (RUSH/TITUS): 84 ML/MIN/1.73M2
EOSINOPHIL # BLD AUTO: 0.26 K/UL (ref 0–0.5)
EOSINOPHIL NFR BLD AUTO: 1.9 % (ref 1–4)
ERYTHROCYTE [DISTWIDTH] IN BLOOD BY AUTOMATED COUNT: 12.3 % (ref 11.5–14.5)
GLOBULIN SER-MCNC: 4 G/DL (ref 2–4)
GLUCOSE SERPL-MCNC: 354 MG/DL (ref 74–106)
HCT VFR BLD AUTO: 43 % (ref 38–47)
HGB BLD-MCNC: 14.5 G/DL (ref 12–16)
IMM GRANULOCYTES # BLD AUTO: 0.06 K/UL (ref 0–0.04)
IMM GRANULOCYTES NFR BLD: 0.4 % (ref 0–0.4)
LYMPHOCYTES # BLD AUTO: 4.4 K/UL (ref 1–4.8)
LYMPHOCYTES NFR BLD AUTO: 32.1 % (ref 27–41)
MCH RBC QN AUTO: 29.8 PG (ref 27–31)
MCHC RBC AUTO-ENTMCNC: 33.7 G/DL (ref 32–36)
MCV RBC AUTO: 88.5 FL (ref 80–96)
MONOCYTES # BLD AUTO: 0.61 K/UL (ref 0–0.8)
MONOCYTES NFR BLD AUTO: 4.5 % (ref 2–6)
MPC BLD CALC-MCNC: 11.4 FL (ref 9.4–12.4)
NEUTROPHILS # BLD AUTO: 8.33 K/UL (ref 1.8–7.7)
NEUTROPHILS NFR BLD AUTO: 60.8 % (ref 53–65)
NRBC # BLD AUTO: 0 X10E3/UL
NRBC, AUTO (.00): 0 %
NT-PROBNP SERPL-MCNC: 11 PG/ML (ref 1–125)
PLATELET # BLD AUTO: 299 K/UL (ref 150–400)
POTASSIUM SERPL-SCNC: 3.5 MMOL/L (ref 3.5–5.1)
PROT SERPL-MCNC: 7.5 G/DL (ref 6.4–8.2)
RBC # BLD AUTO: 4.86 M/UL (ref 4.2–5.4)
SODIUM SERPL-SCNC: 134 MMOL/L (ref 136–145)
TROPONIN I SERPL DL<=0.01 NG/ML-MCNC: <4 PG/ML
TROPONIN I SERPL DL<=0.01 NG/ML-MCNC: <4 PG/ML
WBC # BLD AUTO: 13.7 K/UL (ref 4.5–11)

## 2023-05-27 PROCEDURE — 85025 COMPLETE CBC W/AUTO DIFF WBC: CPT | Performed by: EMERGENCY MEDICINE

## 2023-05-27 PROCEDURE — 83880 ASSAY OF NATRIURETIC PEPTIDE: CPT | Performed by: EMERGENCY MEDICINE

## 2023-05-27 PROCEDURE — 80053 COMPREHEN METABOLIC PANEL: CPT | Performed by: EMERGENCY MEDICINE

## 2023-05-27 PROCEDURE — 84484 ASSAY OF TROPONIN QUANT: CPT | Performed by: EMERGENCY MEDICINE

## 2023-05-27 NOTE — ED PROVIDER NOTES
Encounter Date: 2023    SCRIBE #1 NOTE: I, Alma Aguilar, am scribing for, and in the presence of,  Warner Cooper MD. I have scribed the entire note.     History     Chief Complaint   Patient presents with    Chest Pain     Patient is a 47 y.o. female who presents to the emergency department with complaints of chest pain. Patient explains that yesterday she began to experience chest pain that has not improved. She notes that the pain radiates to her back. Patient also reports difficulty swallowing. Patient has a medical history of gastroesophageal reflux disease. No other symptoms were reported.    The history is provided by the patient. No  was used.   Review of patient's allergies indicates:   Allergen Reactions    Phenergan plain Other (See Comments)     Pt states feeling 'funny'     Past Medical History:   Diagnosis Date    Allergy     Depression     Diabetes mellitus, type 2     Epigastric pain 2021    GERD (gastroesophageal reflux disease)     Hypertension     Sickle cell anemia     trait not anemia     Past Surgical History:   Procedure Laterality Date    APPENDECTOMY      CARPAL TUNNEL RELEASE      CARPAL TUNNEL RELEASE       SECTION      CHOLECYSTECTOMY      ESOPHAGOGASTRODUODENOSCOPY  2021    HYSTERECTOMY       Family History   Problem Relation Age of Onset    Hypertension Mother     Diabetes Mother     Hyperlipidemia Mother     Glaucoma Mother     Arthritis Mother     Cancer Father      Social History     Tobacco Use    Smoking status: Never    Smokeless tobacco: Never   Substance Use Topics    Alcohol use: Never    Drug use: Never     Review of Systems   HENT:  Positive for trouble swallowing.    Eyes: Negative.    Cardiovascular:  Positive for chest pain.   Endocrine: Negative.    Skin: Negative.    Allergic/Immunologic: Negative.    Neurological: Negative.    Hematological: Negative.    Psychiatric/Behavioral: Negative.     All other systems reviewed  and are negative.    Physical Exam     Initial Vitals [05/26/23 2356]   BP Pulse Resp Temp SpO2   (!) 188/104 62 12 98.2 °F (36.8 °C) 99 %      MAP       --         Physical Exam    Nursing note and vitals reviewed.  Constitutional: She appears well-developed and well-nourished.   HENT:   Head: Normocephalic and atraumatic.   Cardiovascular:  Normal rate, regular rhythm and normal heart sounds.           Pulmonary/Chest: Breath sounds normal.   Abdominal: Abdomen is soft. Bowel sounds are normal. There is abdominal tenderness (mild) in the right upper quadrant and epigastric area.     Neurological: She is alert and oriented to person, place, and time.   Skin: Skin is warm and dry.   Psychiatric: She has a normal mood and affect. Thought content normal.       ED Course   Procedures  Labs Reviewed   COMPREHENSIVE METABOLIC PANEL - Abnormal; Notable for the following components:       Result Value    Sodium 134 (*)     Chloride 97 (*)     CO2 33 (*)     Glucose 354 (*)     Alk Phos 109 (*)     ALT 73 (*)     AST 12 (*)     All other components within normal limits   CBC WITH DIFFERENTIAL - Abnormal; Notable for the following components:    WBC 13.70 (*)     Neutrophils, Abs 8.33 (*)     Immature Granulocytes, Absolute 0.06 (*)     All other components within normal limits   TROPONIN I - Normal   NT-PRO NATRIURETIC PEPTIDE - Normal   TROPONIN I - Normal   CBC W/ AUTO DIFFERENTIAL    Narrative:     The following orders were created for panel order CBC auto differential.  Procedure                               Abnormality         Status                     ---------                               -----------         ------                     CBC with Differential[[id number]]        Abnormal            Final result                 Please view results for these tests on the individual orders.   EXTRA TUBES    Narrative:     The following orders were created for panel order EXTRA TUBES.  Procedure                                Abnormality         Status                     ---------                               -----------         ------                     Light Blue Top Hold[[id number]]                              In process                 Gold Top Hold[[id number]]                                    In process                   Please view results for these tests on the individual orders.   LIGHT BLUE TOP HOLD   GOLD TOP HOLD   EXTRA TUBES    Narrative:     The following orders were created for panel order EXTRA TUBES.  Procedure                               Abnormality         Status                     ---------                               -----------         ------                     Lavender Top Hold[[id number]]                                In process                   Please view results for these tests on the individual orders.   LAVENDER TOP HOLD     EKG Readings: (Independently Interpreted)   Rhythm: Normal Sinus Rhythm. Heart Rate: 60.   Interpreted by Dr. Cooper at 23:56.   ECG Results              EKG 12-lead (Chest Pain) Age >30 (In process)  Result time 05/27/23 07:53:48      In process by Interface, [facility] (05/27/23 07:53:48)                   Narrative:    Test Reason : R07.9,    Vent. Rate : 060 BPM     Atrial Rate : 000 BPM     P-R Int : 138 ms          QRS Dur : 092 ms      QT Int : 436 ms       P-R-T Axes : 079 046 063 degrees     QTc Int : 436 ms    Sinus rhythm  Poor R wave progression  Borderline ECG      Referred By: [email or web address]   SELF           Confirmed By:                                   Imaging Results              X-Ray Chest AP Portable (Final result)  Result time 05/27/23 08:57:16      Final result by Sohail Laird MD (05/27/23 08:57:16)                   Impression:      No acute cardiopulmonary process.    Place of service: [facility]      Electronically signed by: Sohail Laird  Date:    05/27/2023  Time:    08:57               Narrative:    EXAMINATION:  XR CHEST AP  PORTABLE    CLINICAL HISTORY:  Chest pain;    TECHNIQUE:  Portable erect chest    COMPARISON:  Prior radiograph 01/28/2020    FINDINGS:  The cardiomediastinal silhouette is within normal limits. The lungs are clear. There is no pneumothorax or pleural effusion.    There is no acute osseous or soft tissue abnormality.                                    X-Rays:   Independently Interpreted Readings:   Chest X-Ray: Normal heart size.  No infiltrates.   Medications - No data to display    Medical Decision Making:   Initial Assessment:   A 47-year-old female patient came to the emergency department complaining of chest pain and some difficulty in swallowing.  Physical examination revealed mild epigastric tenderness and right upper quadrant tenderness.  The patient mentioned that she had history of esophageal stricture and it was stretched before  Differential Diagnosis:   ACS  Esophageal stricture  Cholecystitis  Esophagitis  Pancreatitis  Clinical Tests:   Lab Tests: Ordered and Reviewed       <> Summary of Lab: Labs Reviewed  COMPREHENSIVE METABOLIC PANEL - Abnormal; Notable for the following components:      Result Value   Sodium 134 (*)    Chloride 97 (*)    CO2 33 (*)    Glucose 354 (*)    Alk Phos 109 (*)    ALT 73 (*)    AST 12 (*)    All other components within normal limits  CBC WITH DIFFERENTIAL - Abnormal; Notable for the following components:   WBC 13.70 (*)    Neutrophils, Abs 8.33 (*)    Immature Granulocytes, Absolute 0.06 (*)    All other components within normal limits  TROPONIN I - Normal  NT-PRO NATRIURETIC PEPTIDE - Normal  TROPONIN I - Normal                 Radiological Study: Ordered and Reviewed  ED Management:  The patient has to troponin I 3 hours apart which are negative.  She mentioned the history of esophageal stricture.  I feel that the patient needs to follow-up with her primary care physician to get evaluated for the possible esophageal stricture.          Attending Attestation:            Physician Attestation for Scribe:  Physician Attestation Statement for Scribe #1: I, Warner Cooper MD, reviewed documentation, as scribed by Alma Aguilar in my presence, and it is both accurate and complete.                        Clinical Impression:   Final diagnoses:  [R07.9] Chest pain - Rule out esophageal stricture (Primary)        ED Disposition Condition    Discharge Stable          ED Prescriptions    None       Follow-up Information    None          Warner Cooper MD  05/27/23 1954

## 2023-05-30 ENCOUNTER — OFFICE VISIT (OUTPATIENT)
Dept: FAMILY MEDICINE | Facility: CLINIC | Age: 48
End: 2023-05-30
Payer: COMMERCIAL

## 2023-05-30 VITALS
TEMPERATURE: 98 F | OXYGEN SATURATION: 98 % | HEIGHT: 68 IN | HEART RATE: 90 BPM | BODY MASS INDEX: 32.74 KG/M2 | WEIGHT: 216 LBS | SYSTOLIC BLOOD PRESSURE: 114 MMHG | DIASTOLIC BLOOD PRESSURE: 83 MMHG | RESPIRATION RATE: 18 BRPM

## 2023-05-30 DIAGNOSIS — J02.9 SORE THROAT: Primary | ICD-10-CM

## 2023-05-30 DIAGNOSIS — K21.9 GASTROESOPHAGEAL REFLUX DISEASE, UNSPECIFIED WHETHER ESOPHAGITIS PRESENT: ICD-10-CM

## 2023-05-30 DIAGNOSIS — J02.9 ACUTE PHARYNGITIS, UNSPECIFIED ETIOLOGY: ICD-10-CM

## 2023-05-30 LAB
CTP QC/QA: YES
S PYO RRNA THROAT QL PROBE: NEGATIVE

## 2023-05-30 PROCEDURE — 87880 STREP A ASSAY W/OPTIC: CPT | Mod: QW,,, | Performed by: FAMILY MEDICINE

## 2023-05-30 PROCEDURE — 3008F PR BODY MASS INDEX (BMI) DOCUMENTED: ICD-10-PCS | Mod: ,,, | Performed by: FAMILY MEDICINE

## 2023-05-30 PROCEDURE — 3046F PR MOST RECENT HEMOGLOBIN A1C LEVEL > 9.0%: ICD-10-PCS | Mod: ,,, | Performed by: FAMILY MEDICINE

## 2023-05-30 PROCEDURE — 3079F DIAST BP 80-89 MM HG: CPT | Mod: ,,, | Performed by: FAMILY MEDICINE

## 2023-05-30 PROCEDURE — 99213 OFFICE O/P EST LOW 20 MIN: CPT | Mod: ,,, | Performed by: FAMILY MEDICINE

## 2023-05-30 PROCEDURE — 87880 POCT RAPID STREP A: ICD-10-PCS | Mod: QW,,, | Performed by: FAMILY MEDICINE

## 2023-05-30 PROCEDURE — 3066F NEPHROPATHY DOC TX: CPT | Mod: ,,, | Performed by: FAMILY MEDICINE

## 2023-05-30 PROCEDURE — 3008F BODY MASS INDEX DOCD: CPT | Mod: ,,, | Performed by: FAMILY MEDICINE

## 2023-05-30 PROCEDURE — 3046F HEMOGLOBIN A1C LEVEL >9.0%: CPT | Mod: ,,, | Performed by: FAMILY MEDICINE

## 2023-05-30 PROCEDURE — 3079F PR MOST RECENT DIASTOLIC BLOOD PRESSURE 80-89 MM HG: ICD-10-PCS | Mod: ,,, | Performed by: FAMILY MEDICINE

## 2023-05-30 PROCEDURE — 3074F PR MOST RECENT SYSTOLIC BLOOD PRESSURE < 130 MM HG: ICD-10-PCS | Mod: ,,, | Performed by: FAMILY MEDICINE

## 2023-05-30 PROCEDURE — 1159F MED LIST DOCD IN RCRD: CPT | Mod: ,,, | Performed by: FAMILY MEDICINE

## 2023-05-30 PROCEDURE — 99213 PR OFFICE/OUTPT VISIT, EST, LEVL III, 20-29 MIN: ICD-10-PCS | Mod: ,,, | Performed by: FAMILY MEDICINE

## 2023-05-30 PROCEDURE — 3061F NEG MICROALBUMINURIA REV: CPT | Mod: ,,, | Performed by: FAMILY MEDICINE

## 2023-05-30 PROCEDURE — 3061F PR NEG MICROALBUMINURIA RESULT DOCUMENTED/REVIEW: ICD-10-PCS | Mod: ,,, | Performed by: FAMILY MEDICINE

## 2023-05-30 PROCEDURE — 3074F SYST BP LT 130 MM HG: CPT | Mod: ,,, | Performed by: FAMILY MEDICINE

## 2023-05-30 PROCEDURE — 1159F PR MEDICATION LIST DOCUMENTED IN MEDICAL RECORD: ICD-10-PCS | Mod: ,,, | Performed by: FAMILY MEDICINE

## 2023-05-30 PROCEDURE — 3066F PR DOCUMENTATION OF TREATMENT FOR NEPHROPATHY: ICD-10-PCS | Mod: ,,, | Performed by: FAMILY MEDICINE

## 2023-05-30 RX ORDER — ESOMEPRAZOLE MAGNESIUM 40 MG/1
40 CAPSULE, DELAYED RELEASE ORAL
Qty: 90 CAPSULE | Refills: 3 | Status: SHIPPED | OUTPATIENT
Start: 2023-05-30

## 2023-05-30 RX ORDER — AZITHROMYCIN 250 MG/1
TABLET, FILM COATED ORAL
Qty: 6 TABLET | Refills: 0 | Status: SHIPPED | OUTPATIENT
Start: 2023-05-30

## 2023-05-30 RX ORDER — FAMOTIDINE 40 MG/1
40 TABLET, FILM COATED ORAL DAILY
Qty: 90 TABLET | Refills: 3 | Status: SHIPPED | OUTPATIENT
Start: 2023-05-30 | End: 2024-05-29

## 2023-05-30 NOTE — PROGRESS NOTES
Subjective     Patient ID: Cole Herrera is a 47 y.o. female.    Chief Complaint: Blister (Patient presents with blisters on her tongue X 2 days ) and Sore Throat    Patient also has some pain when swallowing.  In her throat down into her chest.  She may have had some fever last night.    Review of Systems       Objective     Physical Exam  Constitutional:       General: She is not in acute distress.     Appearance: She is not ill-appearing.   HENT:      Right Ear: Tympanic membrane normal.      Left Ear: Tympanic membrane normal.      Nose: No congestion or rhinorrhea.      Mouth/Throat:      Tongue: No lesions.      Pharynx: Posterior oropharyngeal erythema present.      Comments: Scattered papular lesion on tongue.  No blisters.  Cardiovascular:      Rate and Rhythm: Normal rate and regular rhythm.   Pulmonary:      Effort: Pulmonary effort is normal.      Breath sounds: Normal breath sounds.   Lymphadenopathy:      Cervical: No cervical adenopathy.   Skin:     Findings: No rash.   Neurological:      Mental Status: She is alert.          Assessment and Plan     1. Sore throat  -     POCT rapid strep A    2. Acute pharyngitis, unspecified etiology    3. Gastroesophageal reflux disease, unspecified whether esophagitis present    Other orders  -     esomeprazole (NEXIUM) 40 MG capsule; Take 1 capsule (40 mg total) by mouth before breakfast.  Dispense: 90 capsule; Refill: 3  -     azithromycin (ZITHROMAX Z-TOÑA) 250 MG tablet; Two today then 1 daily  Dispense: 6 tablet; Refill: 0  -     famotidine (PEPCID) 40 MG tablet; Take 1 tablet (40 mg total) by mouth once daily.  Dispense: 90 tablet; Refill: 3        Pharyngitis probably due to GERD.  We will begin Nexium and Pepcid for this.  Patient also given anti-reflux information.  I am going to give her Zithromax to cover the possibility that her pharyngitis is strep that was missed         No follow-ups on file.

## 2023-06-06 DIAGNOSIS — R13.10 DYSPHAGIA: Primary | ICD-10-CM

## 2023-06-08 ENCOUNTER — HOSPITAL ENCOUNTER (OUTPATIENT)
Dept: GASTROENTEROLOGY | Facility: HOSPITAL | Age: 48
Discharge: HOME | End: 2023-06-08
Attending: INTERNAL MEDICINE
Payer: COMMERCIAL

## 2023-06-08 ENCOUNTER — ANESTHESIA (OUTPATIENT)
Dept: GASTROENTEROLOGY | Facility: HOSPITAL | Age: 48
End: 2023-06-08
Payer: COMMERCIAL

## 2023-06-08 ENCOUNTER — ANESTHESIA EVENT (OUTPATIENT)
Dept: GASTROENTEROLOGY | Facility: HOSPITAL | Age: 48
End: 2023-06-08
Payer: COMMERCIAL

## 2023-06-08 VITALS
BODY MASS INDEX: 32.74 KG/M2 | HEART RATE: 87 BPM | HEIGHT: 68 IN | OXYGEN SATURATION: 98 % | WEIGHT: 216 LBS | SYSTOLIC BLOOD PRESSURE: 97 MMHG | DIASTOLIC BLOOD PRESSURE: 49 MMHG | RESPIRATION RATE: 13 BRPM

## 2023-06-08 DIAGNOSIS — R13.10 DYSPHAGIA: ICD-10-CM

## 2023-06-08 DIAGNOSIS — R13.19 ESOPHAGEAL DYSPHAGIA: Primary | ICD-10-CM

## 2023-06-08 DIAGNOSIS — Z12.11 SCREENING FOR COLON CANCER: Primary | ICD-10-CM

## 2023-06-08 DIAGNOSIS — K21.9 GASTROESOPHAGEAL REFLUX DISEASE WITHOUT ESOPHAGITIS: ICD-10-CM

## 2023-06-08 LAB — GLUCOSE SERPL-MCNC: 286 MG/DL (ref 70–105)

## 2023-06-08 PROCEDURE — 25000003 PHARM REV CODE 250: Performed by: NURSE ANESTHETIST, CERTIFIED REGISTERED

## 2023-06-08 PROCEDURE — 63600175 PHARM REV CODE 636 W HCPCS: Performed by: NURSE ANESTHETIST, CERTIFIED REGISTERED

## 2023-06-08 PROCEDURE — 37000008 HC ANESTHESIA 1ST 15 MINUTES

## 2023-06-08 PROCEDURE — 43235 PR EGD, FLEX, DIAGNOSTIC: ICD-10-PCS | Mod: ,,, | Performed by: INTERNAL MEDICINE

## 2023-06-08 PROCEDURE — D9220A PRA ANESTHESIA: ICD-10-PCS | Mod: ,,, | Performed by: NURSE ANESTHETIST, CERTIFIED REGISTERED

## 2023-06-08 PROCEDURE — 43235 EGD DIAGNOSTIC BRUSH WASH: CPT | Mod: ,,, | Performed by: INTERNAL MEDICINE

## 2023-06-08 PROCEDURE — 43450 DILATE ESOPHAGUS 1/MULT PASS: CPT | Performed by: INTERNAL MEDICINE

## 2023-06-08 PROCEDURE — D9220A PRA ANESTHESIA: Mod: ,,, | Performed by: NURSE ANESTHETIST, CERTIFIED REGISTERED

## 2023-06-08 PROCEDURE — 43450 DILATE ESOPHAGUS 1/MULT PASS: CPT | Mod: 51,,, | Performed by: INTERNAL MEDICINE

## 2023-06-08 PROCEDURE — 82962 GLUCOSE BLOOD TEST: CPT

## 2023-06-08 PROCEDURE — 43450 PR DILATE ESOPHAGUS: ICD-10-PCS | Mod: 51,,, | Performed by: INTERNAL MEDICINE

## 2023-06-08 PROCEDURE — 43235 EGD DIAGNOSTIC BRUSH WASH: CPT | Performed by: INTERNAL MEDICINE

## 2023-06-08 RX ORDER — PROPOFOL 10 MG/ML
VIAL (ML) INTRAVENOUS
Status: DISCONTINUED | OUTPATIENT
Start: 2023-06-08 | End: 2023-06-08

## 2023-06-08 RX ORDER — SODIUM CHLORIDE 0.9 % (FLUSH) 0.9 %
10 SYRINGE (ML) INJECTION
Status: CANCELLED | OUTPATIENT
Start: 2023-06-08

## 2023-06-08 RX ORDER — LIDOCAINE HYDROCHLORIDE 20 MG/ML
INJECTION, SOLUTION EPIDURAL; INFILTRATION; INTRACAUDAL; PERINEURAL
Status: DISCONTINUED | OUTPATIENT
Start: 2023-06-08 | End: 2023-06-08

## 2023-06-08 RX ADMIN — PROPOFOL 60 MG: 10 INJECTION, EMULSION INTRAVENOUS at 01:06

## 2023-06-08 RX ADMIN — PROPOFOL 100 MG: 10 INJECTION, EMULSION INTRAVENOUS at 01:06

## 2023-06-08 RX ADMIN — PROPOFOL 50 MG: 10 INJECTION, EMULSION INTRAVENOUS at 01:06

## 2023-06-08 RX ADMIN — SODIUM CHLORIDE: 9 INJECTION, SOLUTION INTRAVENOUS at 01:06

## 2023-06-08 RX ADMIN — LIDOCAINE HYDROCHLORIDE 100 MG: 20 INJECTION, SOLUTION INTRAVENOUS at 01:06

## 2023-06-08 NOTE — DISCHARGE INSTRUCTIONS
Procedure Date  6/8/23     Impression  Overall Impression: Mucosa of the esophagus was normal. The esophagus was dilated with a 48F Wilcox dilator. The stomach has mild erythema without ulcers. Mucosa of the duodenum was normal.      Recommendation    Follow up with PCP      Continue PPI or H2RA for gerd. Repeat dilation of the esophagus prn.    NO DRIVING, OPERATING EQUIPMENT, OR SIGNING LEGAL DOCUMENTS FOR 24 HOURS.

## 2023-06-08 NOTE — H&P
[facility]  Gastroenterology  H&P    Patient Name: Cole Herrera  MRN: [id number]  Admission Date: 2023  Code Status: Prior    Attending Provider: Kirk Ferrera MD   Primary Care Physician: Shaun Capone DO  Principal Problem:<principal problem not specified>    Subjective:     History of Present Illness: Pt has c/o dysphagia for solids and has gastritis and gerd.    Past Medical History:   Diagnosis Date    Allergy     Depression     Diabetes mellitus, type 2     Epigastric pain 2021    GERD (gastroesophageal reflux disease)     Hypertension     Sickle cell anemia     trait not anemia       Past Surgical History:   Procedure Laterality Date    APPENDECTOMY      CARPAL TUNNEL RELEASE      CARPAL TUNNEL RELEASE       SECTION      CHOLECYSTECTOMY      ESOPHAGOGASTRODUODENOSCOPY  2021    HYSTERECTOMY         Review of patient's allergies indicates:   Allergen Reactions    Phenergan plain Other (See Comments)     Pt states feeling 'funny'     Family History       Problem Relation (Age of Onset)    Arthritis Mother    Cancer Father    Diabetes Mother    Glaucoma Mother    Hyperlipidemia Mother    Hypertension Mother          Tobacco Use    Smoking status: Never    Smokeless tobacco: Never   Substance and Sexual Activity    Alcohol use: Never    Drug use: Never    Sexual activity: Yes     Partners: Male     Review of Systems   Respiratory: Negative.     Cardiovascular: Negative.    Gastrointestinal: Negative.    Objective:     Vital Signs (Most Recent):  Pulse: 81 (23 1213)  Resp: 10 (23 1213)  BP: 109/80 (23 1213)  SpO2: 97 % (23 1213) Vital Signs (24h Range):  Pulse:  [81] 81  Resp:  [10] 10  SpO2:  [97 %] 97 %  BP: (109)/(80) 109/80     Weight: 98 kg (216 lb) (23 1213)  Body mass index is 32.84 kg/m².    No intake or output data in the 24 hours ending 23 1338    Lines/Drains/Airways       Peripheral Intravenous Line  Duration                   Peripheral IV - Single Lumen 06/08/23 1212 22 G Anterior;Proximal;Right Forearm <1 day                    Physical Exam  Vitals reviewed.   Constitutional:       General: She is not in acute distress.     Appearance: Normal appearance. She is well-developed. She is obese. She is not ill-appearing.   HENT:      Head: Normocephalic and atraumatic.      Nose: Nose normal.   Eyes:      Pupils: Pupils are equal, round, and reactive to light.   Cardiovascular:      Rate and Rhythm: Normal rate and regular rhythm.   Pulmonary:      Effort: Pulmonary effort is normal.      Breath sounds: Normal breath sounds. No wheezing.   Abdominal:      General: Abdomen is flat. Bowel sounds are normal. There is no distension.      Palpations: Abdomen is soft.      Tenderness: There is no abdominal tenderness. There is no guarding.   Skin:     General: Skin is warm and dry.      Coloration: Skin is not jaundiced.   Neurological:      Mental Status: She is alert.   Psychiatric:         Attention and Perception: Attention normal.         Mood and Affect: Affect normal.         Speech: Speech normal.         Behavior: Behavior is cooperative.      Comments: Pt was calm while speaking.       Significant Labs:  CBC: No results for input(s): WBC, HGB, HCT, PLT in the last 48 hours.  CMP: No results for input(s): GLU, CALCIUM, ALBUMIN, PROT, NA, K, CO2, CL, BUN, CREATININE, ALKPHOS, ALT, AST, BILITOT in the last 48 hours.    Significant Imaging:  Imaging results within the past 24 hours have been reviewed.    Assessment/Plan:     There are no hospital problems to display for this patient.        Imp: gerd, dysphagia  Plan: egd with dilation    Kirk Ferrera MD  Gastroenterology  [facility]

## 2023-06-08 NOTE — ANESTHESIA PREPROCEDURE EVALUATION
2023  Cole Herrera is a 47 y.o., female.      Pre-op Assessment    I have reviewed the Patient Summary Reports.     I have reviewed the Nursing Notes. I have reviewed the NPO Status.   I have reviewed the Medications.     Review of Systems  Anesthesia Hx:  No problems with previous Anesthesia        Physical Exam  General: Well nourished, Cooperative, Alert and Oriented    Airway:  Mallampati: II   Mouth Opening: Normal  TM Distance: Normal  Tongue: Normal  Neck ROM: Normal ROM    Dental:  Intact        Anesthesia Plan  Type of Anesthesia, risks & benefits discussed:    Anesthesia Type: Gen Natural Airway, MAC  Intra-op Monitoring Plan: Standard ASA Monitors  Post Op Pain Control Plan: multimodal analgesia  Induction:  IV  Informed Consent: Informed consent signed with the Patient and all parties understand the risks and agree with anesthesia plan.  All questions answered. Patient consented to blood products? Yes  ASA Score: 3  Day of Surgery Review of History & Physical: I have interviewed and examined the patient. I have reviewed the patient's H&P dated: There are no significant changes.     Ready For Surgery From Anesthesia Perspective.      Past Medical History:   Diagnosis Date    Allergy     Depression     Diabetes mellitus, type 2     Epigastric pain 2021    GERD (gastroesophageal reflux disease)     Hypertension     Sickle cell anemia     trait not anemia       Past Surgical History:   Procedure Laterality Date    APPENDECTOMY      CARPAL TUNNEL RELEASE      CARPAL TUNNEL RELEASE       SECTION      CHOLECYSTECTOMY      ESOPHAGOGASTRODUODENOSCOPY  2021    HYSTERECTOMY         Family History   Problem Relation Age of Onset    Hypertension Mother     Diabetes Mother     Hyperlipidemia Mother     Glaucoma Mother     Arthritis Mother     Cancer Father         Social History     Socioeconomic History    Marital status:     Number of children: 2   Tobacco Use    Smoking status: Never    Smokeless tobacco: Never   Substance and Sexual Activity    Alcohol use: Never    Drug use: Never    Sexual activity: Yes     Partners: Male       Current Outpatient Medications   Medication Sig Dispense Refill    azithromycin (ZITHROMAX Z-TOÑA) 250 MG tablet Two today then 1 daily 6 tablet 0    citalopram (CELEXA) 20 MG tablet Take 1 tablet by mouth once daily 90 tablet 3    dexchlorphen-phenylephrine-DM (POLYTUSSIN DM) 1-5-10 mg/5 mL Syrp Take 10 mLs by mouth every 6 (six) hours as needed (cough/congestion). (Patient not taking: Reported on 1/19/2023) 120 mL 0    esomeprazole (NEXIUM) 40 MG capsule Take 1 capsule (40 mg total) by mouth before breakfast. 90 capsule 3    famotidine (PEPCID) 40 MG tablet Take 1 tablet (40 mg total) by mouth once daily. 90 tablet 3    gabapentin (NEURONTIN) 300 MG capsule Take 1 capsule (300 mg total) by mouth once daily. 90 capsule 3    insulin degludec (TRESIBA FLEXTOUCH U-100) 100 unit/mL (3 mL) InPn Inject 60 Units into the skin nightly.      metFORMIN (GLUCOPHAGE) 500 MG tablet TAKE 1 TABLET BY MOUTH TWICE DAILY WITH MEALS 180 tablet 3    NOVOLOG FLEXPEN U-100 INSULIN 100 unit/mL (3 mL) InPn pen Inject 40 Units into the skin 3 (three) times daily.      OZEMPIC 0.25 mg or 0.5 mg(2 mg/1.5 mL) pen injector INJECT 0.5 MG SUBCUTANEOUSLY ONCE A WEEK 3 pen 3    polyethylene glycol (GLYCOLAX) 17 gram PwPk Take 17 g by mouth once daily. (Patient not taking: Reported on 1/19/2023) 10 packet 0    rOPINIRole (REQUIP) 0.25 MG tablet Take 1 tablet (0.25 mg total) by mouth every evening. 30 tablet 3    triamterene-hydrochlorothiazide 37.5-25 mg (MAXZIDE-25) 37.5-25 mg per tablet Take 1 tablet by mouth once daily 90 tablet 3     No current facility-administered medications for this encounter.       Review of patient's allergies indicates:    Allergen Reactions    Phenergan plain Other (See Comments)     Pt states feeling 'funny'     Patient Active Problem List   Diagnosis    Type 2 diabetes mellitus without complication, without long-term current use of insulin    Essential hypertension    Gastroesophageal reflux disease    Hyperlipidemia    Obesity    Anxiety    Depression    Epigastric pain    Esophageal dysphagia    Elevated liver enzymes    Fatty liver    ESTES (nonalcoholic steatohepatitis)    Restless legs    Constipation    Acute superficial gastritis without hemorrhage    Acute pain of right shoulder    Sickle cell trait    Fatigue     .

## 2023-06-08 NOTE — TRANSFER OF CARE
"Anesthesia Transfer of Care Note    Patient: Cole Herrera    Procedure(s) Performed: * No procedures listed *    Patient location: PACU    Anesthesia Type: general    Transport from OR: Transported from OR on room air with adequate spontaneous ventilation    Post pain: adequate analgesia    Post assessment: no apparent anesthetic complications    Post vital signs: stable    Level of consciousness: alert and responds to stimulation    Nausea/Vomiting: no nausea/vomiting    Complications: none    Transfer of care protocol was followed      Last vitals:   Visit Vitals  /70   Pulse 81   Resp 12   Ht 5' 8" (1.727 m)   Wt 98 kg (216 lb)   SpO2 95%   Breastfeeding No   BMI 32.84 kg/m²     " [facility]

## 2023-06-13 DIAGNOSIS — E11.9 TYPE 2 DIABETES MELLITUS WITHOUT COMPLICATION, WITH LONG-TERM CURRENT USE OF INSULIN: Primary | ICD-10-CM

## 2023-06-13 DIAGNOSIS — Z79.4 TYPE 2 DIABETES MELLITUS WITHOUT COMPLICATION, WITH LONG-TERM CURRENT USE OF INSULIN: Primary | ICD-10-CM

## 2023-06-13 RX ORDER — SEMAGLUTIDE 1.34 MG/ML
1 INJECTION, SOLUTION SUBCUTANEOUS
Qty: 3 ML | Refills: 0 | Status: SHIPPED | OUTPATIENT
Start: 2023-06-13 | End: 2023-06-21

## 2023-06-13 NOTE — TELEPHONE ENCOUNTER
Spoke with pt due to ozempic 0.5mg pa being denied. She has had an increase in A1C since being on ozempic. We are increasing pt dose to 1mg x4 weeks and pt has been instructed to call us once she finishes so we can send in the 2mg dose for her. Pt has verbalized understanding. We are sending medication to [facility].

## 2023-06-15 NOTE — ANESTHESIA POSTPROCEDURE EVALUATION
Anesthesia Post Evaluation    Patient: Cole Herrera    Procedure(s) Performed: * No procedures listed *    Final Anesthesia Type: general      Patient location during evaluation: PACU  Patient participation: Yes- Able to Participate  Level of consciousness: awake and alert and oriented  Post-procedure vital signs: reviewed and stable  Pain management: adequate  Airway patency: patent    PONV status at discharge: No PONV  Anesthetic complications: no      Cardiovascular status: hemodynamically stable  Respiratory status: unassisted  Hydration status: euvolemic  Follow-up not needed.          Vitals Value Taken Time   BP 88/65 06/08/23 1413   Temp  06/15/23 1001   Pulse 85 06/08/23 1416   Resp 12 06/08/23 1416   SpO2 98 % 06/08/23 1416   Vitals shown include unvalidated device data.      Event Time   Out of Recovery 14:22:35         Pain/Juan Alberto Score: No data recorded

## 2023-06-21 DIAGNOSIS — E11.9 TYPE 2 DIABETES MELLITUS WITHOUT COMPLICATION, WITH LONG-TERM CURRENT USE OF INSULIN: Primary | ICD-10-CM

## 2023-06-21 DIAGNOSIS — Z79.4 TYPE 2 DIABETES MELLITUS WITHOUT COMPLICATION, WITH LONG-TERM CURRENT USE OF INSULIN: Primary | ICD-10-CM

## 2023-06-21 RX ORDER — TIRZEPATIDE 2.5 MG/.5ML
2.5 INJECTION, SOLUTION SUBCUTANEOUS
Qty: 3 PEN | Refills: 3 | Status: SHIPPED | OUTPATIENT
Start: 2023-06-21 | End: 2023-12-13

## 2023-06-21 NOTE — TELEPHONE ENCOUNTER
Insurance is denying pa for ozempic. We are going to try to get pt approved for mounjaro. She is asking for it to be sent to [facility]

## 2023-06-23 ENCOUNTER — TELEPHONE (OUTPATIENT)
Dept: INTERNAL MEDICINE | Facility: CLINIC | Age: 48
End: 2023-06-23
Payer: COMMERCIAL

## 2023-06-23 NOTE — TELEPHONE ENCOUNTER
----- Message from Patricia Avila sent at 6/23/2023  9:49 AM CDT -----  PATIENT CALLED STATING SHE NEED A COUPON FOR SOME INSULIN YOU CALLED IN Wednesday. THE PHARMACY WILL NOT COVER PLEASE GIVE HER A CALL BACK.

## 2023-06-30 ENCOUNTER — TELEPHONE (OUTPATIENT)
Dept: INTERNAL MEDICINE | Facility: CLINIC | Age: 48
End: 2023-06-30
Payer: COMMERCIAL

## 2023-06-30 NOTE — TELEPHONE ENCOUNTER
Called to inform pt that I have given the pharmacy a coupon for the mounjaro. Pt thanked me for doing this. I explained to her that we will keep trying to get the pa approved for this medication and will give her an update when I can

## 2023-07-19 ENCOUNTER — TELEPHONE (OUTPATIENT)
Dept: INTERNAL MEDICINE | Facility: CLINIC | Age: 48
End: 2023-07-19
Payer: COMMERCIAL

## 2023-07-19 NOTE — TELEPHONE ENCOUNTER
Spoke to pt and she is requesting medication for a yeast infection. I have explained to pt that Dr. Capone is out of office this week and she will need to reach out to her obgyn or go to a walk in clinic. Pt thanked me for getting back with her

## 2023-07-19 NOTE — TELEPHONE ENCOUNTER
----- Message from Patricia Avila sent at 7/19/2023  1:25 PM CDT -----  PATIENT NEED YOU TO GIVE HER A CALL. NO, DETAILS WAS GIVEN.

## 2023-09-07 RX ORDER — HYDROCODONE BITARTRATE AND ACETAMINOPHEN 7.5; 325 MG/1; MG/1
1 TABLET ORAL EVERY 8 HOURS PRN
COMMUNITY
Start: 2023-04-23

## 2023-09-07 RX ORDER — CYCLOBENZAPRINE HCL 10 MG
10 TABLET ORAL 3 TIMES DAILY
COMMUNITY
Start: 2023-04-23

## 2023-09-07 RX ORDER — ONDANSETRON 4 MG/1
TABLET, ORALLY DISINTEGRATING ORAL EVERY 6 HOURS PRN
COMMUNITY
Start: 2023-04-23 | End: 2023-12-13

## 2023-09-14 ENCOUNTER — OFFICE VISIT (OUTPATIENT)
Dept: INTERNAL MEDICINE | Facility: CLINIC | Age: 48
End: 2023-09-14
Payer: COMMERCIAL

## 2023-09-14 VITALS
SYSTOLIC BLOOD PRESSURE: 110 MMHG | DIASTOLIC BLOOD PRESSURE: 66 MMHG | OXYGEN SATURATION: 97 % | WEIGHT: 209 LBS | RESPIRATION RATE: 18 BRPM | HEIGHT: 68 IN | HEART RATE: 97 BPM | BODY MASS INDEX: 31.67 KG/M2 | TEMPERATURE: 97 F

## 2023-09-14 DIAGNOSIS — G25.81 RESTLESS LEGS: ICD-10-CM

## 2023-09-14 DIAGNOSIS — Z09 FOLLOW-UP EXAM: Primary | ICD-10-CM

## 2023-09-14 DIAGNOSIS — E11.9 TYPE 2 DIABETES MELLITUS WITHOUT COMPLICATION, WITHOUT LONG-TERM CURRENT USE OF INSULIN: ICD-10-CM

## 2023-09-14 DIAGNOSIS — E78.5 HYPERLIPIDEMIA, UNSPECIFIED HYPERLIPIDEMIA TYPE: ICD-10-CM

## 2023-09-14 DIAGNOSIS — F41.9 ANXIETY: ICD-10-CM

## 2023-09-14 DIAGNOSIS — E66.9 OBESITY, UNSPECIFIED CLASSIFICATION, UNSPECIFIED OBESITY TYPE, UNSPECIFIED WHETHER SERIOUS COMORBIDITY PRESENT: ICD-10-CM

## 2023-09-14 DIAGNOSIS — R53.83 FATIGUE, UNSPECIFIED TYPE: ICD-10-CM

## 2023-09-14 DIAGNOSIS — F32.A DEPRESSION, UNSPECIFIED DEPRESSION TYPE: ICD-10-CM

## 2023-09-14 DIAGNOSIS — D57.3 SICKLE CELL TRAIT: ICD-10-CM

## 2023-09-14 DIAGNOSIS — I10 ESSENTIAL HYPERTENSION: ICD-10-CM

## 2023-09-14 DIAGNOSIS — K76.0 FATTY LIVER: ICD-10-CM

## 2023-09-14 DIAGNOSIS — K21.9 GASTROESOPHAGEAL REFLUX DISEASE, UNSPECIFIED WHETHER ESOPHAGITIS PRESENT: ICD-10-CM

## 2023-09-14 PROCEDURE — 3074F PR MOST RECENT SYSTOLIC BLOOD PRESSURE < 130 MM HG: ICD-10-PCS | Mod: S$GLB,,, | Performed by: INTERNAL MEDICINE

## 2023-09-14 PROCEDURE — 99215 PR OFFICE/OUTPT VISIT, EST, LEVL V, 40-54 MIN: ICD-10-PCS | Mod: S$GLB,,, | Performed by: INTERNAL MEDICINE

## 2023-09-14 PROCEDURE — 3061F PR NEG MICROALBUMINURIA RESULT DOCUMENTED/REVIEW: ICD-10-PCS | Mod: S$GLB,,, | Performed by: INTERNAL MEDICINE

## 2023-09-14 PROCEDURE — 1159F PR MEDICATION LIST DOCUMENTED IN MEDICAL RECORD: ICD-10-PCS | Mod: S$GLB,,, | Performed by: INTERNAL MEDICINE

## 2023-09-14 PROCEDURE — 3066F PR DOCUMENTATION OF TREATMENT FOR NEPHROPATHY: ICD-10-PCS | Mod: S$GLB,,, | Performed by: INTERNAL MEDICINE

## 2023-09-14 PROCEDURE — 3074F SYST BP LT 130 MM HG: CPT | Mod: S$GLB,,, | Performed by: INTERNAL MEDICINE

## 2023-09-14 PROCEDURE — 3046F HEMOGLOBIN A1C LEVEL >9.0%: CPT | Mod: S$GLB,,, | Performed by: INTERNAL MEDICINE

## 2023-09-14 PROCEDURE — 3066F NEPHROPATHY DOC TX: CPT | Mod: S$GLB,,, | Performed by: INTERNAL MEDICINE

## 2023-09-14 PROCEDURE — 99215 OFFICE O/P EST HI 40 MIN: CPT | Mod: PBBFAC | Performed by: INTERNAL MEDICINE

## 2023-09-14 PROCEDURE — 3078F PR MOST RECENT DIASTOLIC BLOOD PRESSURE < 80 MM HG: ICD-10-PCS | Mod: S$GLB,,, | Performed by: INTERNAL MEDICINE

## 2023-09-14 PROCEDURE — 3061F NEG MICROALBUMINURIA REV: CPT | Mod: S$GLB,,, | Performed by: INTERNAL MEDICINE

## 2023-09-14 PROCEDURE — 3008F PR BODY MASS INDEX (BMI) DOCUMENTED: ICD-10-PCS | Mod: S$GLB,,, | Performed by: INTERNAL MEDICINE

## 2023-09-14 PROCEDURE — 3008F BODY MASS INDEX DOCD: CPT | Mod: S$GLB,,, | Performed by: INTERNAL MEDICINE

## 2023-09-14 PROCEDURE — 3078F DIAST BP <80 MM HG: CPT | Mod: S$GLB,,, | Performed by: INTERNAL MEDICINE

## 2023-09-14 PROCEDURE — 1159F MED LIST DOCD IN RCRD: CPT | Mod: S$GLB,,, | Performed by: INTERNAL MEDICINE

## 2023-09-14 PROCEDURE — 99215 OFFICE O/P EST HI 40 MIN: CPT | Mod: S$GLB,,, | Performed by: INTERNAL MEDICINE

## 2023-09-14 PROCEDURE — 3046F PR MOST RECENT HEMOGLOBIN A1C LEVEL > 9.0%: ICD-10-PCS | Mod: S$GLB,,, | Performed by: INTERNAL MEDICINE

## 2023-09-14 RX ORDER — ATORVASTATIN CALCIUM 10 MG/1
10 TABLET, FILM COATED ORAL DAILY
Qty: 90 TABLET | Refills: 3 | Status: SHIPPED | OUTPATIENT
Start: 2023-09-14 | End: 2024-09-13

## 2023-09-14 RX ORDER — METFORMIN HYDROCHLORIDE 850 MG/1
850 TABLET ORAL 2 TIMES DAILY WITH MEALS
Qty: 180 TABLET | Refills: 1 | Status: SHIPPED | OUTPATIENT
Start: 2023-09-14

## 2023-09-14 RX ORDER — GABAPENTIN 300 MG/1
300 CAPSULE ORAL DAILY
Qty: 90 CAPSULE | Refills: 3 | Status: SHIPPED | OUTPATIENT
Start: 2023-09-14

## 2023-09-14 NOTE — PROGRESS NOTES
Subjective:       Patient ID: Cole Herrera is a 47 y.o. female.    Chief Complaint: Leg Pain ((R) leg pain, states it has been one week since her leg went numb. She said at times it just goes numb and she has fell twice. )    The patient is a 45-year-old  female the presents today to establish care.  She has a history of hypertension, diabetes, GERD, and non alcoholic fatty liver disease.  She was recently started on the Ozempic and states that she has not been taking her insulin since then.  This is due to her fasting blood sugars being in the low 100s to 120s.  Only complaint today is of some leg jerks while sleeping.  She states that it is severe enough that it prevents her from being able to get a good night's rest.  It does not happen all the time.  Today she is resting comfortably in no distress.  She is afebrile and vital signs are stable.    1/19/23-the patient presents today for follow-up.  Over the last couple of months she is been dealing with some myalgias and arthralgias.  Mainly confined to the right shoulder.  She denies any injury.  She states that has progressively worsened and is made worse with movements.  Open can test is positive on exam.  Her blood pressure looks good today.  It is 129/76.  She has not been routinely checking her blood sugars.  Her last A1c was 10.7%.  She also complains of fatigue and just feels like she is tired all the time.  Today she is resting comfortably in no distress.  She is afebrile and vital signs are stable.    9/14/23-the patient presents today for follow-up.  Recently she is been having some issues with numbness and tingling in her right leg.  It is similar to her neuropathy but she states that it is worse and will not go away.  Up until this point the neuropathy seem to come and go.  She admits that her sugars have been elevated.  She is currently taking 45 units of Tresiba at night and 35 units of NovoLog with meals.  States that her highest  What Type Of Note Output Would You Prefer (Optional)?: Bullet Format Hpi Title: Evaluation of Skin Lesions blood sugars in the mornings and they are running in the 300s.  She is tolerating the metformin okay.  Otherwise she denies any complaints.  She is afebrile today and vital signs are stable.  Blood pressure is 110/66.    Leg Pain   Associated symptoms include numbness.   Follow-up  Associated symptoms include arthralgias, numbness and weakness. Pertinent negatives include no abdominal pain, chest pain, chills, congestion, coughing, fatigue, fever, headaches, joint swelling, myalgias, nausea, neck pain, rash, sore throat or vomiting.   Fatigue  Associated symptoms include arthralgias, numbness and weakness. Pertinent negatives include no abdominal pain, chest pain, chills, congestion, coughing, fatigue, fever, headaches, joint swelling, myalgias, nausea, neck pain, rash, sore throat or vomiting.   Nausea  Associated symptoms include arthralgias, numbness and weakness. Pertinent negatives include no abdominal pain, chest pain, chills, congestion, coughing, fatigue, fever, headaches, joint swelling, myalgias, nausea, neck pain, rash, sore throat or vomiting.     Review of Systems   Constitutional:  Negative for activity change, appetite change, chills, fatigue, fever and unexpected weight change.   HENT:  Negative for nasal congestion, ear pain, hearing loss, rhinorrhea, sinus pressure/congestion, sore throat and trouble swallowing.    Eyes:  Negative for pain, discharge, redness and visual disturbance.   Respiratory:  Negative for apnea, cough, chest tightness, shortness of breath and wheezing.    Cardiovascular:  Negative for chest pain and palpitations.   Gastrointestinal:  Negative for abdominal pain, blood in stool, constipation, diarrhea, nausea and vomiting.   Endocrine: Negative for cold intolerance, heat intolerance, polydipsia and polyuria.   Genitourinary:  Negative for difficulty urinating, dysuria, hematuria and menstrual problem.   Musculoskeletal:  Positive for arthralgias. Negative for back pain, joint  Have Your Spot(S) Been Treated In The Past?: has not been treated swelling, leg pain, myalgias and neck pain.   Integumentary:  Negative for pallor, rash and wound.   Allergic/Immunologic: Negative for immunocompromised state.   Neurological:  Positive for weakness and numbness. Negative for tremors, seizures, headaches and memory loss.        Paresthesias   Hematological:  Negative for adenopathy.   Psychiatric/Behavioral:  Negative for confusion, dysphoric mood and sleep disturbance. The patient is not nervous/anxious.          Objective:      Physical Exam  Vitals and nursing note reviewed.   Constitutional:       General: She is not in acute distress.     Appearance: Normal appearance. She is obese. She is not ill-appearing.   HENT:      Head: Normocephalic and atraumatic.      Right Ear: External ear normal.      Left Ear: External ear normal.      Nose: Nose normal.      Mouth/Throat:      Pharynx: Oropharynx is clear.   Eyes:      Extraocular Movements: Extraocular movements intact.      Conjunctiva/sclera: Conjunctivae normal.      Pupils: Pupils are equal, round, and reactive to light.   Neck:      Vascular: No carotid bruit.   Cardiovascular:      Rate and Rhythm: Normal rate and regular rhythm.      Pulses: Normal pulses.      Heart sounds: Normal heart sounds. No murmur heard.  Pulmonary:      Effort: No respiratory distress.      Breath sounds: Normal breath sounds. No wheezing or rales.   Abdominal:      General: Bowel sounds are normal.      Palpations: Abdomen is soft.   Musculoskeletal:         General: No tenderness. Normal range of motion.      Cervical back: Normal range of motion and neck supple.      Right lower leg: No edema.      Left lower leg: No edema.   Skin:     General: Skin is warm and dry.      Capillary Refill: Capillary refill takes less than 2 seconds.      Coloration: Skin is not jaundiced or pale.   Neurological:      General: No focal deficit present.      Mental Status: She is alert and oriented to person, place, and time.      Cranial  Additional History: Pt mentioned a spot on the right ear he would like Jayden to look at Nerves: No cranial nerve deficit.      Sensory: Sensory deficit present.      Motor: No weakness.      Comments: Good strength against gravity and resistance in all muscle groups  She is decreased sensation to light touch below-the-knee on the right side   Psychiatric:         Mood and Affect: Mood normal.         Judgment: Judgment normal.         Assessment:       1. Follow-up exam    2. Restless legs    3. Anxiety    4. Depression, unspecified depression type    5. Essential hypertension    6. Hyperlipidemia, unspecified hyperlipidemia type    7. Sickle cell trait    8. Type 2 diabetes mellitus without complication, without long-term current use of insulin    9. Obesity, unspecified classification, unspecified obesity type, unspecified whether serious comorbidity present    10. Gastroesophageal reflux disease, unspecified whether esophagitis present    11. Fatty liver    12. Fatigue, unspecified type        Plan:       1. Patient is here  for follow-up.  She missed her three-month follow-up.  It has been about 8 months since we last saw her.    2. Essential hypertension-blood pressure looks good.  It is 110/66.  Continue with current care    3. Diabetes mellitus type 2----poorly controlled-----the patient's home medications include Ozempic, metformin, Tresiba, and NovoLog.  She states that since she started the Ozempic she has not required any insulin.  She states her blood sugars have been running in the low 100s to 120s.  We are going to check a hemoglobin A1c.  Eye exam and foot exam are up-to-date.  1/19/23-she is noncompliant with diet medications.  We are checking an A1c today.  Last A1c was 10.7%.  We will follow-up and shape therapy accordingly.  I have counseled her on the importance of compliance with medications, appointments, and diet.  9/14/23-blood sugars at home have been elevated.  We are going to repeat an A1c today.  Foot exam with no lesions.  We are going to increase her Tresiba from 45 units  at bedtime to 55 units.  She will continue with the NovoLog 35 units with meals.  We are also going to increase her metformin to 850 mg twice a day.    4. GERD-stable.  Continue with PPI    5. Restless legs-recently had iron studies which were within normal limits.  Which she describes also could be hypnic jerks.  We are going to try low dose of Requip at bedtime to see if this will help----the Requip has helped.    6.  non alcoholic fatty liver disease-she has been seen by Dr. Ferrera. Ultrasound scheduled for tomorrow.  All other testing has been within normal limits.  She is on appropriate therapy with the Ozempic.  She is losing weight.  She is down from about 250 lb to 236. She abstains from heavy alcohol use.  We are going to check a CMP today  9/14/23-weight is now down to 209 lb.  She had some labs done in May which we have reviewed.  She has an mildly elevated ALT but otherwise everything looks okay    7. Neuropathy-related to uncontrolled blood sugars.  We are going to check an A1c today.  Increase gabapentin at bedtime.  Hopefully we can get her blood sugars under better control    8. History of sickle cell trait-she thinks that this is accurate.  We are going to check a hemoglobin electrophoresis-consistent with trach    9. Fatigue-multifactorial and in part related to uncontrolled blood sugars.  I have explained to her that very rarely we find a medical reason for fatigue.  However we will check a B12 and folate, a CBC, CMP, TSH, and free T4  9/14/23-doing better.  Also think is her blood sugars improve her energy level will improve as well    Return to care in 3 months

## 2023-10-12 ENCOUNTER — TELEPHONE (OUTPATIENT)
Dept: INTERNAL MEDICINE | Facility: CLINIC | Age: 48
End: 2023-10-12
Payer: COMMERCIAL

## 2023-12-04 RX ORDER — TRIAMTERENE/HYDROCHLOROTHIAZID 37.5-25 MG
1 TABLET ORAL DAILY
Qty: 90 TABLET | Refills: 0 | Status: SHIPPED | OUTPATIENT
Start: 2023-12-04 | End: 2024-02-27

## 2023-12-05 ENCOUNTER — TELEPHONE (OUTPATIENT)
Dept: INTERNAL MEDICINE | Facility: CLINIC | Age: 48
End: 2023-12-05
Payer: COMMERCIAL

## 2023-12-13 ENCOUNTER — OFFICE VISIT (OUTPATIENT)
Dept: FAMILY MEDICINE | Facility: CLINIC | Age: 48
End: 2023-12-13
Payer: COMMERCIAL

## 2023-12-13 VITALS
HEIGHT: 68 IN | SYSTOLIC BLOOD PRESSURE: 130 MMHG | HEART RATE: 111 BPM | WEIGHT: 206 LBS | DIASTOLIC BLOOD PRESSURE: 90 MMHG | BODY MASS INDEX: 31.22 KG/M2 | OXYGEN SATURATION: 99 % | RESPIRATION RATE: 19 BRPM | TEMPERATURE: 99 F

## 2023-12-13 DIAGNOSIS — Z20.828 EXPOSURE TO VIRAL DISEASE: Primary | ICD-10-CM

## 2023-12-13 DIAGNOSIS — U07.1 COVID-19 VIRUS INFECTION: ICD-10-CM

## 2023-12-13 LAB
CTP QC/QA: YES
CTP QC/QA: YES
FLUAV AG NPH QL: NEGATIVE
FLUBV AG NPH QL: NEGATIVE
SARS-COV-2 AG RESP QL IA.RAPID: POSITIVE

## 2023-12-13 PROCEDURE — 3075F PR MOST RECENT SYSTOLIC BLOOD PRESS GE 130-139MM HG: ICD-10-PCS | Mod: ,,, | Performed by: FAMILY MEDICINE

## 2023-12-13 PROCEDURE — 3066F NEPHROPATHY DOC TX: CPT | Mod: ,,, | Performed by: FAMILY MEDICINE

## 2023-12-13 PROCEDURE — 99213 OFFICE O/P EST LOW 20 MIN: CPT | Mod: ,,, | Performed by: FAMILY MEDICINE

## 2023-12-13 PROCEDURE — 1159F PR MEDICATION LIST DOCUMENTED IN MEDICAL RECORD: ICD-10-PCS | Mod: ,,, | Performed by: FAMILY MEDICINE

## 2023-12-13 PROCEDURE — 3080F PR MOST RECENT DIASTOLIC BLOOD PRESSURE >= 90 MM HG: ICD-10-PCS | Mod: ,,, | Performed by: FAMILY MEDICINE

## 2023-12-13 PROCEDURE — 3075F SYST BP GE 130 - 139MM HG: CPT | Mod: ,,, | Performed by: FAMILY MEDICINE

## 2023-12-13 PROCEDURE — 87804 INFLUENZA ASSAY W/OPTIC: CPT | Mod: QW,,, | Performed by: FAMILY MEDICINE

## 2023-12-13 PROCEDURE — 99213 PR OFFICE/OUTPT VISIT, EST, LEVL III, 20-29 MIN: ICD-10-PCS | Mod: ,,, | Performed by: FAMILY MEDICINE

## 2023-12-13 PROCEDURE — 3046F PR MOST RECENT HEMOGLOBIN A1C LEVEL > 9.0%: ICD-10-PCS | Mod: ,,, | Performed by: FAMILY MEDICINE

## 2023-12-13 PROCEDURE — 3080F DIAST BP >= 90 MM HG: CPT | Mod: ,,, | Performed by: FAMILY MEDICINE

## 2023-12-13 PROCEDURE — 87426 SARS CORONAVIRUS 2 ANTIGEN POCT: ICD-10-PCS | Mod: QW,,, | Performed by: FAMILY MEDICINE

## 2023-12-13 PROCEDURE — 87804 POCT INFLUENZA A/B: ICD-10-PCS | Mod: QW,,, | Performed by: FAMILY MEDICINE

## 2023-12-13 PROCEDURE — 1159F MED LIST DOCD IN RCRD: CPT | Mod: ,,, | Performed by: FAMILY MEDICINE

## 2023-12-13 PROCEDURE — 3008F BODY MASS INDEX DOCD: CPT | Mod: ,,, | Performed by: FAMILY MEDICINE

## 2023-12-13 PROCEDURE — 87426 SARSCOV CORONAVIRUS AG IA: CPT | Mod: QW,,, | Performed by: FAMILY MEDICINE

## 2023-12-13 PROCEDURE — 3008F PR BODY MASS INDEX (BMI) DOCUMENTED: ICD-10-PCS | Mod: ,,, | Performed by: FAMILY MEDICINE

## 2023-12-13 PROCEDURE — 3061F NEG MICROALBUMINURIA REV: CPT | Mod: ,,, | Performed by: FAMILY MEDICINE

## 2023-12-13 PROCEDURE — 3061F PR NEG MICROALBUMINURIA RESULT DOCUMENTED/REVIEW: ICD-10-PCS | Mod: ,,, | Performed by: FAMILY MEDICINE

## 2023-12-13 PROCEDURE — 3066F PR DOCUMENTATION OF TREATMENT FOR NEPHROPATHY: ICD-10-PCS | Mod: ,,, | Performed by: FAMILY MEDICINE

## 2023-12-13 PROCEDURE — 3046F HEMOGLOBIN A1C LEVEL >9.0%: CPT | Mod: ,,, | Performed by: FAMILY MEDICINE

## 2023-12-13 RX ORDER — NIRMATRELVIR AND RITONAVIR 300-100 MG
KIT ORAL
Qty: 30 TABLET | Refills: 0 | Status: SHIPPED | OUTPATIENT
Start: 2023-12-13 | End: 2023-12-18

## 2023-12-13 RX ORDER — ONDANSETRON 4 MG/1
8 TABLET, ORALLY DISINTEGRATING ORAL EVERY 8 HOURS PRN
Qty: 18 TABLET | Refills: 1 | Status: SHIPPED | OUTPATIENT
Start: 2023-12-13

## 2023-12-13 NOTE — LETTER
December 13, 2023      [facility]  [address] [facility] [place] [address]  Phone: [phone]  Fax: [phone]       Patient: Cole Herrera   YOB: 1975  Date of Visit: 12/13/2023    To Whom It May Concern:    Wilfredo Herrera  was at [facility] on 12/13/2023. The patient may return to work/school on 12/18/2023 with no restrictions. If you have any questions or concerns, or if I can be of further assistance, please do not hesitate to contact me.    Sincerely,    Torrey Gómez LPN

## 2023-12-13 NOTE — PROGRESS NOTES
Subjective     Patient ID: Cole Herrera is a 48 y.o. female.    Chief Complaint: Nasal Congestion, Fatigue, and Nausea    Also with some cough.  Symptoms began yesterday.  No vomiting.  She has had some diarrhea    Fatigue  Associated symptoms include fatigue and nausea.   Nausea  Associated symptoms include fatigue and nausea.     Review of Systems   Constitutional:  Positive for fatigue.   Gastrointestinal:  Positive for nausea.          Objective     Physical Exam  Constitutional:       Appearance: She is ill-appearing. She is not toxic-appearing.   HENT:      Nose: Congestion present.      Mouth/Throat:      Pharynx: No posterior oropharyngeal erythema.   Cardiovascular:      Rate and Rhythm: Normal rate and regular rhythm.   Pulmonary:      Effort: Pulmonary effort is normal.      Breath sounds: Rales (few faint scattered coarse crackles) present.   Abdominal:      Tenderness: There is abdominal tenderness (mild diffuse superficial). There is no guarding.   Neurological:      Mental Status: She is alert.            Assessment and Plan     1. Exposure to viral disease  -     SARS Coronavirus 2 Antigen, POCT  -     POCT Influenza A/B Rapid Antigen    2. COVID-19 virus infection    Other orders  -     nirmatrelvir-ritonavir (PAXLOVID) 300 mg (150 mg x 2)-100 mg copackaged tablets (EUA); Take 3 tablets by mouth 2 (two) times daily. Each dose contains 2 nirmatrelvir (pink tablets) and 1 ritonavir (white tablet). Take all 3 tablets together  Dispense: 30 tablet; Refill: 0        Patient will discontinue her cholesterol medication (atorvastatin) for 10 days     Go to emergency room for any severe shortness of breath    No follow-ups on file.

## 2023-12-18 RX ORDER — CITALOPRAM 20 MG/1
TABLET, FILM COATED ORAL
Qty: 90 TABLET | Refills: 3 | Status: SHIPPED | OUTPATIENT
Start: 2023-12-18

## 2024-02-07 NOTE — TELEPHONE ENCOUNTER
Spoke to pt and she states mounjaro has been discontinued. It looks like dr samson cancelled the mounjaro rx but she does not know why. Pt also asking for refill of protonix to [facility].

## 2024-02-07 NOTE — TELEPHONE ENCOUNTER
----- Message from Rosina Weinstein sent at 2/7/2024  3:10 PM CST -----  Patient called wanting to speak to a nurse about why her prescription was cancelled. She stated that she's at [facility] on the [address] and they told her it was cancelled. The prescription wasn't in her sidebar but it is an insulin. A good contact number is [phone].

## 2024-02-08 RX ORDER — PANTOPRAZOLE SODIUM 40 MG/1
40 TABLET, DELAYED RELEASE ORAL DAILY
Qty: 90 TABLET | Refills: 3 | Status: SHIPPED | OUTPATIENT
Start: 2024-02-08 | End: 2025-02-07

## 2024-02-08 RX ORDER — TIRZEPATIDE 2.5 MG/.5ML
2.5 INJECTION, SOLUTION SUBCUTANEOUS
Qty: 12 PEN | Refills: 3 | Status: SHIPPED | OUTPATIENT
Start: 2024-02-08

## 2024-02-27 RX ORDER — TRIAMTERENE/HYDROCHLOROTHIAZID 37.5-25 MG
1 TABLET ORAL DAILY
Qty: 90 TABLET | Refills: 3 | Status: SHIPPED | OUTPATIENT
Start: 2024-02-27

## 2024-04-04 ENCOUNTER — OFFICE VISIT (OUTPATIENT)
Dept: PRIMARY CARE CLINIC | Facility: CLINIC | Age: 49
End: 2024-04-04
Payer: COMMERCIAL

## 2024-04-04 ENCOUNTER — HOSPITAL ENCOUNTER (OUTPATIENT)
Dept: RADIOLOGY | Facility: HOSPITAL | Age: 49
Discharge: HOME | End: 2024-04-04
Attending: NURSE PRACTITIONER
Payer: COMMERCIAL

## 2024-04-04 VITALS
DIASTOLIC BLOOD PRESSURE: 84 MMHG | WEIGHT: 203 LBS | SYSTOLIC BLOOD PRESSURE: 122 MMHG | HEART RATE: 90 BPM | TEMPERATURE: 98 F | OXYGEN SATURATION: 98 % | RESPIRATION RATE: 18 BRPM | BODY MASS INDEX: 30.77 KG/M2 | HEIGHT: 68 IN

## 2024-04-04 DIAGNOSIS — E11.9 TYPE 2 DIABETES MELLITUS WITHOUT COMPLICATION, WITHOUT LONG-TERM CURRENT USE OF INSULIN: Primary | ICD-10-CM

## 2024-04-04 DIAGNOSIS — R10.31 RIGHT LOWER QUADRANT ABDOMINAL PAIN: ICD-10-CM

## 2024-04-04 DIAGNOSIS — B37.9 CANDIDIASIS: ICD-10-CM

## 2024-04-04 DIAGNOSIS — Z11.4 SCREENING FOR HIV (HUMAN IMMUNODEFICIENCY VIRUS): ICD-10-CM

## 2024-04-04 PROCEDURE — 3008F BODY MASS INDEX DOCD: CPT | Mod: ,,, | Performed by: NURSE PRACTITIONER

## 2024-04-04 PROCEDURE — 74018 RADEX ABDOMEN 1 VIEW: CPT | Mod: TC

## 2024-04-04 PROCEDURE — 1160F RVW MEDS BY RX/DR IN RCRD: CPT | Mod: ,,, | Performed by: NURSE PRACTITIONER

## 2024-04-04 PROCEDURE — 1159F MED LIST DOCD IN RCRD: CPT | Mod: ,,, | Performed by: NURSE PRACTITIONER

## 2024-04-04 PROCEDURE — 99214 OFFICE O/P EST MOD 30 MIN: CPT | Mod: ,,, | Performed by: NURSE PRACTITIONER

## 2024-04-04 PROCEDURE — 3074F SYST BP LT 130 MM HG: CPT | Mod: ,,, | Performed by: NURSE PRACTITIONER

## 2024-04-04 PROCEDURE — 74018 RADEX ABDOMEN 1 VIEW: CPT | Mod: 26,,, | Performed by: RADIOLOGY

## 2024-04-04 PROCEDURE — 3079F DIAST BP 80-89 MM HG: CPT | Mod: ,,, | Performed by: NURSE PRACTITIONER

## 2024-04-04 RX ORDER — FLUCONAZOLE 150 MG/1
150 TABLET ORAL WEEKLY
Qty: 2 TABLET | Refills: 0 | Status: SHIPPED | OUTPATIENT
Start: 2024-04-04

## 2024-04-04 NOTE — PROGRESS NOTES
Subjective     Patient ID: Cole Herrera is a 48 y.o. female.    Chief Complaint: Abdominal Pain, Nausea, and Diarrhea (Last time was last night. Stool today is soft and very dark)    Pt presents with generalized abd pain x several weeks. Pt reports normal daily bowel movements. Pt reports mild nausea at times.     Review of Systems   Constitutional:  Negative for activity change, appetite change, chills, fatigue and fever.   HENT:  Negative for nasal congestion, ear discharge, nosebleeds, postnasal drip, rhinorrhea, sinus pressure/congestion, sneezing, sore throat and tinnitus.    Eyes:  Negative for pain, discharge, redness and itching.   Respiratory:  Negative for cough, choking, chest tightness, shortness of breath and wheezing.    Cardiovascular:  Negative for chest pain.   Gastrointestinal:  Positive for abdominal pain and nausea. Negative for abdominal distention, blood in stool, change in bowel habit, constipation, diarrhea and vomiting.   Genitourinary:  Negative for decreased urine volume, dysuria, flank pain and frequency.        Vaginal itching    Musculoskeletal:  Negative for back pain and gait problem.   Integumentary:  Negative for wound, breast mass and breast discharge.   Allergic/Immunologic: Negative for immunocompromised state.   Neurological:  Negative for dizziness, light-headedness and headaches.   Psychiatric/Behavioral:  Negative for agitation, behavioral problems and hallucinations.    Breast: Negative for mass         Objective     Physical Exam  Vitals and nursing note reviewed.   Constitutional:       Appearance: Normal appearance.   Cardiovascular:      Rate and Rhythm: Normal rate and regular rhythm.      Heart sounds: Normal heart sounds.   Pulmonary:      Effort: Pulmonary effort is normal.      Breath sounds: Normal breath sounds.   Abdominal:      General: Bowel sounds are normal. There is no distension.      Tenderness: There is no abdominal tenderness. There is no guarding.    Musculoskeletal:         General: Normal range of motion.   Neurological:      Mental Status: She is alert and oriented to person, place, and time.   Psychiatric:         Mood and Affect: Mood normal.         Behavior: Behavior normal.          Assessment and Plan     1. Type 2 diabetes mellitus without complication, without long-term current use of insulin  -     Hemoglobin A1C; Future; Expected date: 04/04/2024  -     CBC Auto Differential; Future; Expected date: 04/04/2024  -     Comprehensive Metabolic Panel; Future; Expected date: 04/04/2024  -     Lipid Panel; Future; Expected date: 04/04/2024  -     TSH; Future; Expected date: 04/04/2024  -     Microalbumin/Creatinine Ratio, Urine; Future; Expected date: 04/04/2024  -     Urinalysis, Reflex to Urine Culture; Future; Expected date: 04/04/2024    2. Screening for HIV (human immunodeficiency virus)  -     HIV 1/2 Ag/Ab (4th Gen); Future; Expected date: 04/04/2024    3. Right lower quadrant abdominal pain  -     X-Ray KUB; Future; Expected date: 04/04/2024    4. Candidiasis  -     fluconazole (DIFLUCAN) 150 MG Tab; Take 1 tablet (150 mg total) by mouth once a week.  Dispense: 2 tablet; Refill: 0        Will call pt with lab results.          Follow up if symptoms worsen or fail to improve.

## 2024-04-05 DIAGNOSIS — E11.8 DIABETES MELLITUS TYPE 2 WITH COMPLICATIONS: Primary | ICD-10-CM

## 2024-05-19 ENCOUNTER — HOSPITAL ENCOUNTER (EMERGENCY)
Facility: HOSPITAL | Age: 49
Discharge: HOME | End: 2024-05-19
Payer: OTHER MISCELLANEOUS

## 2024-05-19 VITALS
SYSTOLIC BLOOD PRESSURE: 120 MMHG | DIASTOLIC BLOOD PRESSURE: 84 MMHG | WEIGHT: 206 LBS | HEART RATE: 77 BPM | RESPIRATION RATE: 18 BRPM | HEIGHT: 68 IN | TEMPERATURE: 98 F | OXYGEN SATURATION: 98 % | BODY MASS INDEX: 31.22 KG/M2

## 2024-05-19 DIAGNOSIS — M25.461 EFFUSION OF RIGHT KNEE: ICD-10-CM
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
## 2024-05-19 DIAGNOSIS — M25.571 RIGHT ANKLE PAIN: ICD-10-CM

## 2024-05-19 PROCEDURE — 96372 THER/PROPH/DIAG INJ SC/IM: CPT | Performed by: NURSE PRACTITIONER

## 2024-05-19 PROCEDURE — 99284 EMERGENCY DEPT VISIT MOD MDM: CPT | Mod: 25

## 2024-05-19 PROCEDURE — 63600175 PHARM REV CODE 636 W HCPCS: Performed by: NURSE PRACTITIONER

## 2024-05-19 RX ORDER — KETOROLAC TROMETHAMINE 30 MG/ML
60 INJECTION, SOLUTION INTRAMUSCULAR; INTRAVENOUS
Status: COMPLETED | OUTPATIENT
Start: 2024-05-19 | End: 2024-05-19

## 2024-05-19 RX ORDER — NAPROXEN 500 MG/1
500 TABLET ORAL 2 TIMES DAILY
Qty: 30 TABLET | Refills: 0 | Status: SHIPPED | OUTPATIENT
Start: 2024-05-19

## 2024-05-19 RX ADMIN — KETOROLAC TROMETHAMINE 60 MG: 30 INJECTION, SOLUTION INTRAMUSCULAR at 10:05

## 2024-05-19 NOTE — ED TRIAGE NOTES
Presents to ED for complaints of right knee pain and swelling after falling on it Friday.  Patient was walking and tripped, patient states that knee bent backwards when she fell.

## 2024-05-19 NOTE — DISCHARGE INSTRUCTIONS
Naproxen as directed as needed for knee pain  Ice to right knee  Avoid excessive weight bearing for 7 days  Follow-up with primary care in 5 days if symptoms not improving

## 2024-05-19 NOTE — Clinical Note
"Cole"Ander Herrera was seen and treated in our emergency department on 5/19/2024.  She may return to work on 05/22/2024.       If you have any questions or concerns, please don't hesitate to call.      Willis Lainez, LACEY"

## 2024-05-19 NOTE — ED PROVIDER NOTES
Encounter Date: 2024       History     Chief Complaint   Patient presents with    Knee Pain    Fall     47 y/o female presents to the emergency department for reported right knee and ankle pain.  She reports symptoms began 2 days ago after a fall.  She was at her work and a student fell down in front of her and pulled her down, landing on her right leg which she reports was bent underneath her.  She reports bearing minimal weight on the right leg since then.  Denies taking any OTC medication or use of ice to sore area.  Denies distal numbness.      Review of patient's allergies indicates:   Allergen Reactions    Phenergan plain Other (See Comments)     Pt states feeling 'funny'     Past Medical History:   Diagnosis Date    Allergy     Depression     Diabetes mellitus, type 2     Epigastric pain 2021    GERD (gastroesophageal reflux disease)     Hypertension     Sickle cell anemia     trait not anemia     Past Surgical History:   Procedure Laterality Date    APPENDECTOMY      CARPAL TUNNEL RELEASE      CARPAL TUNNEL RELEASE       SECTION      CHOLECYSTECTOMY      ESOPHAGOGASTRODUODENOSCOPY  2021    HYSTERECTOMY       Family History   Problem Relation Name Age of Onset    Hypertension Mother [place] Laphand     Diabetes Mother [place] Laphand     Hyperlipidemia Mother Claudette Laphand     Glaucoma Mother Claudette Laphand     Arthritis Mother Claudette Laphand     Cancer Father Jj Davis      Social History     Tobacco Use    Smoking status: Some Days    Smokeless tobacco: Never    Tobacco comments:     Medical marijuana   Substance Use Topics    Alcohol use: Never    Drug use: Yes     Types: Marijuana     Review of Systems   Constitutional:  Negative for fever.   HENT:  Negative for sore throat.    Respiratory:  Negative for shortness of breath.    Cardiovascular:  Negative for chest pain.   Gastrointestinal:  Negative for nausea.   Genitourinary:  Negative for dysuria.   Musculoskeletal:  Negative for  back pain.   Skin:  Negative for rash.   Neurological:  Negative for weakness.   Hematological:  Does not bruise/bleed easily.   All other systems reviewed and are negative.      Physical Exam     Initial Vitals [05/19/24 0938]   BP Pulse Resp Temp SpO2   135/89 83 16 98.3 °F (36.8 °C) 97 %      MAP       --         Physical Exam    Nursing note and vitals reviewed.  Constitutional: She appears well-developed and well-nourished.   HENT:   Head: Normocephalic.   Eyes: EOM are normal. Pupils are equal, round, and reactive to light.   Neck: Neck supple.   Cardiovascular:  Normal rate, regular rhythm, normal heart sounds and intact distal pulses.           Pulmonary/Chest: Breath sounds normal. She has no wheezes.   Abdominal: Abdomen is soft. Bowel sounds are normal. She exhibits no mass. There is no abdominal tenderness (generalized). There is no rebound and no guarding.   Musculoskeletal:         General: No tenderness. Normal range of motion.      Cervical back: Neck supple.        Legs:      Neurological: She is alert and oriented to person, place, and time. She has normal strength and normal reflexes. GCS score is 15. GCS eye subscore is 4. GCS verbal subscore is 5. GCS motor subscore is 6.   Skin: Skin is warm and dry.   Psychiatric: She has a normal mood and affect.         Medical Screening Exam   See Full Note    ED Course   Procedures  Labs Reviewed - No data to display       Imaging Results              X-Ray Knee 1 or 2 View Right (Final result)  Result time 05/19/24 10:17:20      Final result by Kelvin Torres DO (05/19/24 10:17:20)                   Impression:      As above.    Point of Service: [facility]      Electronically signed by: Kelvin Torres  Date:    05/19/2024  Time:    10:17               Narrative:    EXAMINATION:  XR KNEE 1 OR 2 VIEW RIGHT    CLINICAL HISTORY:  right knee pain;    COMPARISON:  Right knee x-ray June 5, 2022    TECHNIQUE:  Frontal and lateral views of the right  knee.    FINDINGS:  Mild tricompartmental degenerative change.  Small suprapatellar joint effusion.  No convincing acute fracture or dislocation demonstrated. No concerning radiopaque foreign body visualized.                                       X-Ray Ankle Complete Right (Final result)  Result time 05/19/24 10:16:43      Final result by Kelvin Torres DO (05/19/24 10:16:43)                   Impression:      As above.    Point of Service: [facility]      Electronically signed by: Kelvin Torres  Date:    05/19/2024  Time:    10:16               Narrative:    EXAMINATION:  XR ANKLE COMPLETE 3 VIEW RIGHT    CLINICAL HISTORY:  Pain in right ankle and joints of right foot    COMPARISON:  Right ankle x-ray March 4, 2020    TECHNIQUE:  Frontal, lateral, and oblique views of the right ankle.    FINDINGS:  Mild plantar and posterior calcaneal spurring.  No convincing acute fracture or dislocation demonstrated. No concerning radiopaque foreign body visualized.                                       Medications   ketorolac injection 60 mg (60 mg Intramuscular Given 5/19/24 1008)     Medical Decision Making  49 y/o female presents to the emergency department for reported right knee and ankle pain.  She reports symptoms began 2 days ago after a fall.  She was at her work and a student fell down in front of her and pulled her down, landing on her right leg which she reports was bent underneath her.  She reports bearing minimal weight on the right leg since then.  Denies taking any OTC medication or use of ice to sore area.  Denies distal numbness.    Amount and/or Complexity of Data Reviewed  Radiology: ordered.     Details: XR ankle neg  XR right knee, small effusion, no acute bony injury    Risk  Prescription drug management.                                      Clinical Impression:   Final diagnoses:  [M25.571] Right ankle pain  [W19.XXXA] Fall, initial encounter (Primary)  [M25.461] Effusion of right knee         ED Disposition Condition    Discharge Stable          ED Prescriptions       Medication Sig Dispense Start Date End Date Auth. Provider    naproxen (NAPROSYN) 500 MG tablet Take 1 tablet (500 mg total) by mouth 2 (two) times daily. 30 tablet 5/19/2024 -- Willis Lainez, LACEY          Follow-up Information    None          Willis Lainez, LACEY  05/19/24 1029

## 2024-05-20 ENCOUNTER — TELEPHONE (OUTPATIENT)
Dept: EMERGENCY MEDICINE | Facility: HOSPITAL | Age: 49
End: 2024-05-20
Payer: COMMERCIAL

## 2024-05-20 ENCOUNTER — HOSPITAL ENCOUNTER (OUTPATIENT)
Dept: RADIOLOGY | Facility: HOSPITAL | Age: 49
Discharge: HOME | End: 2024-05-20
Attending: INTERNAL MEDICINE
Payer: COMMERCIAL

## 2024-05-20 VITALS — WEIGHT: 206 LBS | HEIGHT: 68 IN | BODY MASS INDEX: 31.22 KG/M2

## 2024-05-20 DIAGNOSIS — Z09 FOLLOW-UP EXAM: ICD-10-CM

## 2024-05-20 PROCEDURE — 77067 SCR MAMMO BI INCL CAD: CPT | Mod: TC

## 2024-05-20 PROCEDURE — 77063 BREAST TOMOSYNTHESIS BI: CPT | Mod: TC

## 2024-05-23 ENCOUNTER — OFFICE VISIT (OUTPATIENT)
Dept: ORTHOPEDICS | Facility: CLINIC | Age: 49
End: 2024-05-23
Payer: COMMERCIAL

## 2024-05-23 VITALS — WEIGHT: 206 LBS | BODY MASS INDEX: 31.22 KG/M2 | HEIGHT: 68 IN

## 2024-05-23 DIAGNOSIS — M25.561 ACUTE PAIN OF RIGHT KNEE: ICD-10-CM

## 2024-05-23 DIAGNOSIS — S89.91XA INJURY OF RIGHT KNEE, INITIAL ENCOUNTER: Primary | ICD-10-CM

## 2024-05-23 PROCEDURE — 99203 OFFICE O/P NEW LOW 30 MIN: CPT | Mod: S$PBB,,,

## 2024-05-23 PROCEDURE — 99213 OFFICE O/P EST LOW 20 MIN: CPT | Mod: PBBFAC

## 2024-05-23 NOTE — PROGRESS NOTES
CC:   Chief Complaint   Patient presents with    Right Knee - Injury          Cole Herrera is a 48 y.o. female seen today for Injury of the Right Knee  .Worker's comp.  Patient states she was at school when she fell and twisted her knee last Friday.  She reports pain and swelling since the injury.  She went to the ED on  where x-rays showed no acute fracture or dislocation.  She was given prescription for naproxen and referred to orthopedic clinic for further evaluation.  Patient reports continued knee pain and swelling.  She reports pain radiating from the front to the back of her knee.  She states that it hurts with weight-bearing.  She was not given crutches in the ED.      PAST MEDICAL HISTORY:   Past Medical History:   Diagnosis Date    Allergy     Depression     Diabetes mellitus, type 2     Epigastric pain 2021    GERD (gastroesophageal reflux disease)     Hypertension     Sickle cell anemia     trait not anemia          PAST SURGICAL HISTORY:   Past Surgical History:   Procedure Laterality Date    APPENDECTOMY      CARPAL TUNNEL RELEASE      CARPAL TUNNEL RELEASE       SECTION      CHOLECYSTECTOMY      ESOPHAGOGASTRODUODENOSCOPY  2021    HYSTERECTOMY            ALLERGIES:   Review of patient's allergies indicates:   Allergen Reactions    Phenergan plain Other (See Comments)     Pt states feeling 'funny'        MEDICATIONS :    Current Outpatient Medications:     atorvastatin (LIPITOR) 10 MG tablet, Take 1 tablet (10 mg total) by mouth once daily., Disp: 90 tablet, Rfl: 3    citalopram (CELEXA) 20 MG tablet, Take 1 tablet by mouth once daily, Disp: 90 tablet, Rfl: 3    cyclobenzaprine (FLEXERIL) 10 MG tablet, Take 10 mg by mouth 3 (three) times daily., Disp: , Rfl:     fluconazole (DIFLUCAN) 150 MG Tab, Take 1 tablet (150 mg total) by mouth once a week., Disp: 2 tablet, Rfl: 0    gabapentin (NEURONTIN) 300 MG capsule, Take 1 capsule (300 mg total) by mouth once  daily., Disp: 90 capsule, Rfl: 3    insulin degludec (TRESIBA FLEXTOUCH U-100) 100 unit/mL (3 mL) InPn, Inject 60 Units into the skin nightly., Disp: , Rfl:     metFORMIN (GLUCOPHAGE) 850 MG tablet, Take 1 tablet (850 mg total) by mouth 2 (two) times daily with meals., Disp: 180 tablet, Rfl: 1    naproxen (NAPROSYN) 500 MG tablet, Take 1 tablet (500 mg total) by mouth 2 (two) times daily., Disp: 30 tablet, Rfl: 0    NOVOLOG FLEXPEN U-100 INSULIN 100 unit/mL (3 mL) InPn pen, Inject 40 Units into the skin 3 (three) times daily., Disp: , Rfl:     pantoprazole (PROTONIX) 40 MG tablet, Take 1 tablet (40 mg total) by mouth once daily., Disp: 90 tablet, Rfl: 3    rOPINIRole (REQUIP) 0.25 MG tablet, Take 1 tablet (0.25 mg total) by mouth every evening., Disp: 30 tablet, Rfl: 3    tirzepatide (MOUNJARO) 2.5 mg/0.5 mL PnIj, Inject 2.5 mg into the skin every 7 days., Disp: 12 Pen, Rfl: 3    triamterene-hydrochlorothiazide 37.5-25 mg (MAXZIDE-25) 37.5-25 mg per tablet, Take 1 tablet by mouth once daily, Disp: 90 tablet, Rfl: 3     SOCIAL HISTORY:   Social History     Socioeconomic History    Marital status:     Number of children: 2   Tobacco Use    Smoking status: Some Days    Smokeless tobacco: Never    Tobacco comments:     Medical marijuana   Substance and Sexual Activity    Alcohol use: Never    Drug use: Yes     Types: Marijuana    Sexual activity: Yes     Partners: Male     Social Determinants of Health     Financial Resource Strain: Low Risk  (4/4/2024)    Overall Financial Resource Strain (CARDIA)     Difficulty of Paying Living Expenses: Not very hard   Food Insecurity: No Food Insecurity (4/4/2024)    Hunger Vital Sign     Worried About Running Out of Food in the Last Year: Never true     Ran Out of Food in the Last Year: Never true   Transportation Needs: No Transportation Needs (4/4/2024)    PRAPARE - Transportation     Lack of Transportation (Medical): No     Lack of Transportation (Non-Medical): No    Physical Activity: Inactive (4/4/2024)    Exercise Vital Sign     Days of Exercise per Week: 0 days     Minutes of Exercise per Session: 0 min   Stress: Stress Concern Present (4/4/2024)    [place] [employer] - Occupational Stress Questionnaire     Feeling of Stress : Very much   Housing Stability: Low Risk  (4/4/2024)    Housing Stability Vital Sign     Unable to Pay for Housing in the Last Year: No     Number of Places Lived in the Last Year: 1     Unstable Housing in the Last Year: No        FAMILY HISTORY:   Family History   Problem Relation Name Age of Onset    Hypertension Mother Claudette Laphand     Diabetes Mother [place] Laphand     Hyperlipidemia Mother Claudette Laphand     Glaucoma Mother [place] Laphand     Arthritis Mother [place] Laphand     Cancer Father Jj Davis           PHYSICAL EXAM:      There were no vitals filed for this visit.  Body mass index is 31.32 kg/m².    GENERAL: Well-developed, well-nourished female . The patient is alert, oriented and cooperative.    HEENT:  Normocephalic, atraumatic.  Extraocular movements are intact bilaterally.     NECK:  Nontender with good range of motion.    LUNGS:  Clear to auscultation bilaterally.    HEART:  Regular rate and rhythm.     ABDOMEN:  Soft, non-tender, non-distended.      EXTREMITIES:  Right knee with skin clean dry and intact, mild soft tissue swelling and possible small joint effusion on exam today, tenderness to palpation along anterior and lateral joint line, good range of motion on exam from 0-100 degrees but not without pain, knee is stable to varus and valgus stress testing, negative anterior drawer testing, positive Garry's and Thessaly testing, neurovascularly intact      RADIOGRAPHIC FINDINGS:   Mammo Digital Screening Bilat w/ Kamran    Result Date: 5/20/2024  Result: Mammo Digital Screening Bilat w/ Kamran  History: Patient is 48 y.o. and is seen for a screening mammogram. Films Compared: Compared to: 07/12/2022 Mammo  Digital Screening Bilat  Findings: This procedure was performed using tomosynthesis. Computer-aided detection was utilized in the interpretation of this examination. There are scattered areas of fibroglandular density. There is no evidence of suspicious masses, calcifications, or other abnormal findings.     Bilateral There is no mammographic evidence of malignancy. BI-RADS Category: Overall: 1 - Negative  Recommendation: Routine screening mammogram in 1 year is recommended. Your estimated lifetime risk of breast cancer (to age 85) based on Tyrer-Cuzick risk assessment model is Tyrer-Cuzick: 7.9%. According to the American Cancer Society, patients with a lifetime breast cancer risk of 20% or higher might benefit from supplemental screening tests.     X-Ray Knee 1 or 2 View Right    Result Date: 5/19/2024  EXAMINATION: XR KNEE 1 OR 2 VIEW RIGHT CLINICAL HISTORY: right knee pain; COMPARISON: Right knee x-ray June 5, 2022 TECHNIQUE: Frontal and lateral views of the right knee. FINDINGS: Mild tricompartmental degenerative change.  Small suprapatellar joint effusion.  No convincing acute fracture or dislocation demonstrated. No concerning radiopaque foreign body visualized.     As above. Point of Service: [facility] Electronically signed by: Kelvin Torres Date:    05/19/2024 Time:    10:17    X-Ray Ankle Complete Right    Result Date: 5/19/2024  EXAMINATION: XR ANKLE COMPLETE 3 VIEW RIGHT CLINICAL HISTORY: Pain in right ankle and joints of right foot COMPARISON: Right ankle x-ray March 4, 2020 TECHNIQUE: Frontal, lateral, and oblique views of the right ankle. FINDINGS: Mild plantar and posterior calcaneal spurring.  No convincing acute fracture or dislocation demonstrated. No concerning radiopaque foreign body visualized.     As above. Point of Service: [facility] Electronically signed by: Kelvin Torres Date:    05/19/2024 Time:    10:16       Patient Active Problem List    Diagnosis Date Noted     Acute pain of right shoulder 01/19/2023    Sickle cell trait 01/19/2023    Fatigue 01/19/2023    Acute superficial gastritis without hemorrhage     Constipation 07/06/2021    ESTES (nonalcoholic steatohepatitis) 06/10/2021    Restless legs 06/10/2021    Elevated liver enzymes 05/17/2021    Fatty liver 05/17/2021    Epigastric pain 05/11/2021    Esophageal dysphagia 05/11/2021    Type 2 diabetes mellitus without complication, without long-term current use of insulin 03/26/2021    Essential hypertension 03/26/2021    Gastroesophageal reflux disease 03/26/2021    Hyperlipidemia 03/26/2021    Obesity 03/26/2021    Anxiety 03/26/2021    Depression 03/26/2021     IMPRESSION AND PLAN:  Right knee injury.  Suspect possible meniscus injury.  Personally reviewed previous x-rays with mild tricompartmental degenerative changes and small prepatellar effusion, no acute fractures or dislocation.  Patient given crutches today with weight-bearing as tolerated.  Recommend MRI for further evaluation of possible meniscus tear.  Continue ice and elevation therapy.  Continue Tylenol and or ibuprofen for pain.  She we will follow up with Dr. Dahl following the MRI as she is seen him in the past.  Patient given excuse today to remain off work until the MRI is completed and she is seen Dr. Dahl.  Follow up sooner p.r.n..    No follow-ups on file.       Trista Avilez PA-C      (Subject to voice recognition error, transcription service not allowed)

## 2024-05-23 NOTE — LETTER
May 23, 2024      [facility]  [address] [place] [address] [place] [address]  Phone: [phone]  Fax: [phone]       Patient: Cole Herrera   YOB: 1975  Date of Visit: 05/23/2024    To Whom It May Concern:    Wilfredo Herrera  was at [facility] on 05/23/2024. She may not return to work until after further testing, which will be done on 06/12/2024. If you have any questions or concerns, or if I can be of further assistance, please do not hesitate to contact me.    Sincerely,    SHIRAZ Easton MA

## 2024-06-17 ENCOUNTER — OFFICE VISIT (OUTPATIENT)
Dept: ORTHOPEDICS | Facility: CLINIC | Age: 49
End: 2024-06-17
Payer: OTHER MISCELLANEOUS

## 2024-06-17 DIAGNOSIS — Z01.812 PRE-OPERATIVE LABORATORY EXAMINATION: ICD-10-CM

## 2024-06-17 DIAGNOSIS — S83.241A ACUTE MEDIAL MENISCUS TEAR OF RIGHT KNEE, INITIAL ENCOUNTER: Primary | ICD-10-CM

## 2024-06-17 PROCEDURE — 99213 OFFICE O/P EST LOW 20 MIN: CPT | Mod: PBBFAC | Performed by: ORTHOPAEDIC SURGERY

## 2024-06-17 PROCEDURE — 99999 PR PBB SHADOW E&M-EST. PATIENT-LVL III: CPT | Mod: PBBFAC,,, | Performed by: ORTHOPAEDIC SURGERY

## 2024-06-17 PROCEDURE — 99214 OFFICE O/P EST MOD 30 MIN: CPT | Mod: S$PBB,,, | Performed by: ORTHOPAEDIC SURGERY

## 2024-06-17 NOTE — PATIENT INSTRUCTIONS
Your surgery is scheduled for June 27,2024 at [facility] in [place].    Labwork (1st floor clinic) ___x___    's office will reach out to you with any other appointments you may need to attend prior to surgery.      Our office will contact you the day before surgery with your arrival time.  Do not eat or drink anything after midnight the night before surgery (this includes gum, candy, chewing tobacco, etc).  Bring all medication in their original bottles.  Bathe with Hibiclens the night or morning before your surgery.  The morning of your surgery ONLY take blood pressure, heart, acid reflux, or thyroid (if you take a morning dose) medication.  Take these medications with a sip of water.   Be sure to have stopped your blood thinner medication at the appropriate time, as instructed.  Bring your C-Pap machine if you have one.  All jewelry, piercings, or false eyelashes MUST be removed prior to surgery.

## 2024-06-17 NOTE — PROGRESS NOTES
CLINIC NOTE       Chief Complaint   Patient presents with    Right Knee - Follow-up        Cole Herrera is a 48 y.o. female seen today for evaluation of right knee injury/pain.  He was reportedly injured on 05/17/2024.  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  She was became entangled with and fell down with another student injuring her right knee.  She would difficulty weight-bearing thereafter.  She was then in the emergency room 2 days later.  X-rays 05/19/2024 of the right knee were unremarkable except for presence of early marginal osteophyte along the medial compartment/distal femur.  She was seen by Trista RAHMAN and sent for an MRI examination conducted on 06/12/2024.  The MRI was consistent with tear of the mesial root of the posterior horn of the medial meniscus.  Again he was small marginal osteophytes were noted along the medial compartment.  She was placed on crutches and referred for orthopedic consultation.    Past Medical History:   Diagnosis Date    Allergy     Depression     Diabetes mellitus, type 2     Epigastric pain 05/11/2021    GERD (gastroesophageal reflux disease)     Hypertension     Sickle cell anemia     trait not anemia     Family History   Problem Relation Name Age of Onset    Hypertension Mother [place] Laphand     Diabetes Mother [place] Laphand     Hyperlipidemia Mother [place] Laphand     Glaucoma Mother [place] Laphand     Arthritis Mother Claudette Laphand     Cancer Father Jj Davis      Current Outpatient Medications on File Prior to Visit   Medication Sig Dispense Refill    atorvastatin (LIPITOR) 10 MG tablet Take 1 tablet (10 mg total) by mouth once daily. 90 tablet 3    citalopram (CELEXA) 20 MG tablet Take 1 tablet by mouth once daily 90 tablet 3    cyclobenzaprine (FLEXERIL) 10 MG tablet Take 10 mg by mouth 3 (three) times daily.      fluconazole (DIFLUCAN) 150 MG Tab Take 1 tablet (150 mg total) by mouth once a week. 2 tablet 0    gabapentin (NEURONTIN) 300 MG capsule  Take 1 capsule (300 mg total) by mouth once daily. 90 capsule 3    insulin degludec (TRESIBA FLEXTOUCH U-100) 100 unit/mL (3 mL) InPn Inject 60 Units into the skin nightly.      metFORMIN (GLUCOPHAGE) 850 MG tablet Take 1 tablet (850 mg total) by mouth 2 (two) times daily with meals. 180 tablet 1    naproxen (NAPROSYN) 500 MG tablet Take 1 tablet (500 mg total) by mouth 2 (two) times daily. 30 tablet 0    NOVOLOG FLEXPEN U-100 INSULIN 100 unit/mL (3 mL) InPn pen Inject 40 Units into the skin 3 (three) times daily.      pantoprazole (PROTONIX) 40 MG tablet Take 1 tablet (40 mg total) by mouth once daily. 90 tablet 3    rOPINIRole (REQUIP) 0.25 MG tablet Take 1 tablet (0.25 mg total) by mouth every evening. 30 tablet 3    tirzepatide (MOUNJARO) 2.5 mg/0.5 mL PnIj Inject 2.5 mg into the skin every 7 days. 12 Pen 3    triamterene-hydrochlorothiazide 37.5-25 mg (MAXZIDE-25) 37.5-25 mg per tablet Take 1 tablet by mouth once daily 90 tablet 3     No current facility-administered medications on file prior to visit.       ROS     There were no vitals filed for this visit.    Past Surgical History:   Procedure Laterality Date    APPENDECTOMY      CARPAL TUNNEL RELEASE      CARPAL TUNNEL RELEASE       SECTION      CHOLECYSTECTOMY      ESOPHAGOGASTRODUODENOSCOPY  2021    HYSTERECTOMY          Review of patient's allergies indicates:   Allergen Reactions    Phenergan plain Other (See Comments)     Pt states feeling 'funny'        Ortho Exam : Well-developed well-nourished black female no acute distress.  She is alert oriented cooperative.  Neck is supple without JVD.  Breathing is regular nonlabored.  Skin is warm and dry no lesions seen.  Exam of the right knee shows no sign of significant intra-articular effusion.  Knee range motion is 0-135 degrees of flexion.  Knee ligaments stable clinically.  There is tenderness palpation along the posteromedial joint line region.    Radiographic  Examination:    Technique:    Findings:    Impression:   See Above    Assessment and Plan  Patient Active Problem List    Diagnosis Date Noted    Acute pain of right shoulder 01/19/2023    Sickle cell trait 01/19/2023    Fatigue 01/19/2023    Acute superficial gastritis without hemorrhage     Constipation 07/06/2021    ESTES (nonalcoholic steatohepatitis) 06/10/2021    Restless legs 06/10/2021    Elevated liver enzymes 05/17/2021    Fatty liver 05/17/2021    Epigastric pain 05/11/2021    Esophageal dysphagia 05/11/2021    Type 2 diabetes mellitus without complication, without long-term current use of insulin 03/26/2021    Essential hypertension 03/26/2021    Gastroesophageal reflux disease 03/26/2021    Hyperlipidemia 03/26/2021    Obesity 03/26/2021    Anxiety 03/26/2021    Depression 03/26/2021    Impression:  Acute medial meniscal tear right knee superimposed on early medial compartment DJD  Plan:  Right knee arthroscopy for partial medial meniscectomy was offered.  The procedure was discussed in details.  Potential benefits and risks of surgery outlined to include but not limited to bleeding, infection, damage to blood vessels and nerves, need for further surgery, other risks and complications including even death the patient wished to proceed.  We discussed outpatient general anesthesia.  Stations of arthroscopy relative to potential DJD were emphasized      Joshua Dahl M.D.

## 2024-06-20 ENCOUNTER — PATIENT MESSAGE (OUTPATIENT)
Dept: INTERNAL MEDICINE | Facility: CLINIC | Age: 49
End: 2024-06-20
Payer: COMMERCIAL

## 2024-06-20 DIAGNOSIS — E11.9 TYPE 2 DIABETES MELLITUS WITHOUT COMPLICATION, WITHOUT LONG-TERM CURRENT USE OF INSULIN: Primary | ICD-10-CM

## 2024-06-26 DIAGNOSIS — S83.241A ACUTE MEDIAL MENISCUS TEAR OF RIGHT KNEE, INITIAL ENCOUNTER: Primary | ICD-10-CM

## 2024-07-01 ENCOUNTER — TELEPHONE (OUTPATIENT)
Dept: ORTHOPEDICS | Facility: CLINIC | Age: 49
End: 2024-07-01
Payer: COMMERCIAL

## 2024-07-01 NOTE — TELEPHONE ENCOUNTER
----- Message from Joslyn Knapp sent at 7/1/2024  3:40 PM CDT -----  Pt was for surgery but work comp denied. Still trying to get them to approve. Scheduled to go back to work in Aug but she wont be able to perform duties. Needs to know what to tell her work. Please call [phone].    Who Called: Cole Herrera    Caller is requesting assistance/information from provider's office.        Preferred Method of Contact: Phone Call  Patient's Preferred Phone Number on File: [phone]   Best Call Back Number, if different:  Additional Information:

## 2024-07-02 NOTE — TELEPHONE ENCOUNTER
Talked with patient. Patient is needing setup with physical therapy before WC will pay for surgery. Let patient know will get patient back in before August to reschedule surgery. Patient verbalized understanding.

## 2024-07-10 NOTE — PROGRESS NOTES
"See PLAN OF CARE     Sup Visit performed today with SIMÓN Velasquez and SIMÓN Ng.  All goals and treatment plan reviewed. Will work toward completion of all goals set.     Plans for first treatment:    Knee Exercises        Bike/Nustep  5 minutes on Nustep   Calf Stretch 4 x 15 seconds    Hamstring Stretch 4 x 15 seconds    Quad Stretch 4 x 15 seconds                Cybex Knee Extension     Cybex Hamstring Curls 2 x 10 with 4 plates   Cybex Hip - Abduction bilateral  2 x 10 with 2 plates   Cybex Hip - Flexion      Cybex Hip - Extension                    Therapeutic Activity x  0 minutes  Forward step ups 6" 2 x 10  Heel raises 2 x 10  Squats 2 x 10  Cybex leg press bilateral 3 x 10 with 6 plates  Cybex leg press single 2 x 10 with 3 plates     Neuro-re-ed x 0 minutes     Lateral  step downs 4"  x 10  Forward step downs 4" x 10  Cable TKE's 2 x 10 with 2-3 second hold with 4 plates   "

## 2024-07-15 ENCOUNTER — CLINICAL SUPPORT (OUTPATIENT)
Dept: REHABILITATION | Facility: HOSPITAL | Age: 49
End: 2024-07-15
Payer: OTHER MISCELLANEOUS

## 2024-07-15 DIAGNOSIS — S83.241A ACUTE MEDIAL MENISCUS TEAR OF RIGHT KNEE, INITIAL ENCOUNTER: Primary | ICD-10-CM

## 2024-07-15 PROCEDURE — 97110 THERAPEUTIC EXERCISES: CPT

## 2024-07-15 PROCEDURE — 97162 PT EVAL MOD COMPLEX 30 MIN: CPT

## 2024-07-15 NOTE — PLAN OF CARE
[facility]   Physical Therapy Initial Evaluation      Name: Cole Herrera  Clinic Number: [id number]    Therapy Diagnosis: Acute medial meniscus tear of right knee    Physician: Joshua Dahl MD    Physician Orders: PT Eval and Treat   Medical Diagnosis from Referral: Acute medial meniscus tear of right knee  Evaluation Date: 7/15/2024  Authorization Period Expiration: 6/26/2024  Plan of Care Expiration: 9/13/2024  Visit # / Visits authorized: 1/ 16   FOTO: 46/100    Precautions: Diabetes     Time In: 5:35 pm  Time Out: 6:15 pm  Total Appointment Time (timed & untimed codes): 40 minutes    Subjective     Date of onset: 5/17/2024    History of current condition - Cole reports: being injured at work due to special needs student getting agitated and wrestling her to ground which resulted in right knee injury. MRI was performed which confirmed medial meniscus tear. Patient's Worker's Comp requires 6 weeks of therapy to approve surgery.      MD note states:   Cole Herrera is a 48 y.o. female seen today for evaluation of right knee injury/pain.  He was reportedly injured on 05/17/2024.  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  She was became entangled with and fell down with another student injuring her right knee.  She would difficulty weight-bearing thereafter.  She was then in the emergency room 2 days later.  X-rays 05/19/2024 of the right knee were unremarkable except for presence of early marginal osteophyte along the medial compartment/distal femur.  She was seen by Trista RAHMAN and sent for an MRI examination conducted on 06/12/2024.  The MRI was consistent with tear of the mesial root of the posterior horn of the medial meniscus.  Again he was small marginal osteophytes were noted along the medial compartment.  She was placed on crutches and referred for orthopedic consultation.    Falls: N/A    Imaging:   MRI KNEE WITHOUT CONTRAST RIGHT     CLINICAL HISTORY:  Knee  trauma, internal derangement suspected, xray done;suspect meniscus tear; Unspecified injury of right lower leg, initial encounter     TECHNIQUE:  Axial sagittal and coronal imaging of the right knee is performed using T1, proton density, proton density fat-sat, STIR and gradient sequences.     COMPARISON:  6 July 2022     FINDINGS:  The anterior and posterior cruciate ligaments are intact without evidence of tear.     There is vertical tear posterior horn medial meniscus near the root ligament.  Remainder of the menisci appear within normal limits.  There is loss of articular cartilage and small osteophytes in the medial compartment and to lesser degree lateral and patellofemoral compartment.     The medial and lateral collateral ligament complexes are intact without evidence of abnormality.     The extensor mechanism is intact and appears within normal limits.     No abnormal osseous of marrow signal or edema is seen. No focal cartilage defect is present.     Impression:     Medial meniscus tear and osteoarthrosis as described above.    Prior Therapy: N/A  Social History:  lives with their spouse/family  Occupation: [employer]   Prior Level of Function: Independent working full-time  Current Level of Function: patient is having constant pain with difficulty walking, standing extended periods of time, getting up from chair and negotiating stairs.    Pain:  Current 5/10, worst 10/10, best 2/10   Location: right knee    Description: Aching, Burning, and Sharp  Aggravating Factors: Sitting, Standing, Walking, Night Time, and Getting out of bed/chair  Easing Factors: rest, elevation, and Anti-inflammatories     Patients goals: To have surgery so she can start recovering and get back to work.     Medical History:   Past Medical History:   Diagnosis Date    Allergy     Depression     Diabetes mellitus, type 2     Epigastric pain 05/11/2021    GERD (gastroesophageal reflux disease)     Hypertension      Sickle cell anemia     trait not anemia       Surgical History:   Cole Herrera  has a past surgical history that includes Cholecystectomy; Hysterectomy; Appendectomy; Carpal tunnel release;  section; Carpal tunnel release; and Esophagogastroduodenoscopy (2021).    Medications:   Cole has a current medication list which includes the following prescription(s): atorvastatin, citalopram, cyclobenzaprine, fluconazole, gabapentin, tresiba flextouch u-100, metformin, naproxen, novolog flexpen u-100 insulin, pantoprazole, ropinirole, mounjaro, and triamterene-hydrochlorothiazide 37.5-25 mg.    Allergies:   Review of patient's allergies indicates:   Allergen Reactions    Phenergan plain Other (See Comments)     Pt states feeling 'funny'        Objective          Observation : Pleasant and cooperative    Supination : Right = Mild                        Left = Mild    Incision : N/A         Girth Measurements :      Right Lower Extremity :  Mid Patella 40.5  cm         Left Lower Extremity :  Mid Patella 39.5  cm     Comments :         Range of Motion/Strength :                  Left Extremity                                                                        Right Extremity   AROM PROM Strength  Location  AROM    PROM   Strength   WNL WNL WNL   Hip      Flexion (140)   4/5                    Extension (10)                       Internal Rotation (40)                       External Rotation (50)                       Abduction (45)   4/5                    Adduction (30)      130 140 5/5   Knee    Flexion (140) 115 125 4/5                     Extension (0) -3 0 4-/5   WNL WNL WNL   Ankle   Dorsiflexion (20)                        Plantar Flexion (50)                        Inversion (35)                        Eversion (25)                 Knee Special Tests :     B. Knee  Lochman's test: right Negative left Negative  Anterior drawer: right Negative left Negative  Posterior drawer: right Negative  left Negative  Varus stress test: right Negative left Negative  Valgus stress test: right Negative left Negative  PFJ grind test: right Positive left Positive  McMurrays: right Positive left Negative  8. Thessaly's Test: Positive on right      Functional Impairments : patient is having constant pain with difficulty walking, standing extended periods of time, getting up from chair and negotiating stairs.        Limitation/Restriction for FOTO Knee Survey    Therapist reviewed FOTO scores for Cole Herrera on 7/15/2024.   FOTO documents entered into [employer] - see Media section.    Limitation Score: 54%         Treatment     Total Treatment time (time-based codes) separate from Evaluation: 10 minutes       Cole received the treatments listed below:  THERAPEUTIC EXERCISES to develop strength, ROM, and flexibility for 10 minutes including QUAD SET, Heel slides, SHORT ARC QUAD, STRAIGHT LEG RAISE, LONG ARC QUAD, seated hip flexion, squats, quad and hamstring stretch.       Patient Education and Home Exercises     Education provided:   - PLAN OF CARE  - HOME EXERCISE PROGRAM     Written Home Exercises Provided: yes. Exercises were reviewed and Cole was able to demonstrate them prior to the end of the session.  Cole demonstrated good  understanding of the education provided. See EMR under Patient Instructions for exercises provided during therapy sessions.    Assessment     Cole is a 48 y.o. female referred to outpatient Physical Therapy with a medical diagnosis of Acute medial meniscus tear of right knee. Patient presents with Right knee pain, edema, abnormal gait, impaired balance, positive tests for medial meniscus tear, decreased right knee motion and weakness of right lower extremity. 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 MRI was performed which confirmed medial meniscus tear. Patient's  Worker's Comp requires 6 weeks of therapy to approve surgery. Patient needs surgical intervention. Will work on improving motion and strengthening without increased joint irritation to get approval for surgery. Patient will require Physical Therapy Intervention to address all deficits and work toward completion of all goals set. Therapist will refer patient back to MD upon completion of Therapy for further assessment and surgical intervention if pain and dysfunction persist.     Patient prognosis is Guarded.   Patient will benefit from skilled outpatient Physical Therapy to address the deficits stated above and in the chart below, provide patient /family education, and to maximize patientt's level of independence.     Plan of care discussed with patient: Yes  Patient's spiritual, cultural and educational needs considered and patient is agreeable to the plan of care and goals as stated below:     Anticipated Barriers for therapy: Patient needs surgical intervention to repair meniscus tear.    Medical Necessity is demonstrated by the following  History  Co-morbidities and personal factors that may impact the plan of care [] LOW: no personal factors / co-morbidities  [] MODERATE: 1-2 personal factors / co-morbidities  [x] HIGH: 3+ personal factors / co-morbidities    Moderate / High Support Documentation:   Co-morbidities affecting plan of care:   Past Medical History:   Diagnosis Date    Allergy     Depression     Diabetes mellitus, type 2     Epigastric pain 2021    GERD (gastroesophageal reflux disease)     Hypertension     Sickle cell anemia     trait not anemia       has a past surgical history that includes Cholecystectomy; Hysterectomy; Appendectomy; Carpal tunnel release;  section; Carpal tunnel release; and Esophagogastroduodenoscopy (2021).  Personal Factors:   no deficits     Examination  Body Structures and Functions, activity limitations and participation restrictions that may impact  the plan of care [] LOW: addressing 1-2 elements  [] MODERATE: 3+ elements  [x] HIGH: 4+ elements (please support below)    Moderate / High Support Documentation: Right knee pain, edema, abnormal gait, impaired balance, positive tests for medial meniscus tear, decreased right knee motion and weakness of right lower extremity.     Clinical Presentation [] LOW: stable  [x] MODERATE: Evolving-Will likely require surgical intervention at the completion of Physical Therapy to repair torn medial meniscus.     [] HIGH: Unstable     Decision Making/ Complexity Score: moderate       Goals:  Short Term Goals: 4 weeks   1. Independent with Home Exercise Program   2. Increase Right Knee Active Range of Motion to 0 Degrees to 120 Degrees  3. Increase Right Knee Strength to grossly 4+/5  4. Patient will ambulate 500 feet with Normal Gait pattern with complaints of pain Less than or Equal to 4/10.      Long Term Goals: 8 weeks   1. Patient will increase Right Knee Strength to grossly 5/5  2. Patient will ambulate 1000+ feet with complaints of pain Less than or Equal to 2/10.        Plan     Plan of care Certification: 7/15/2024 to 9/13/2024.    Outpatient Physical Therapy 2 times weekly for 8 weeks to include the following interventions: 05281 [therapeutic exercise], 38830 [neuromuscular re-education], 75343 [manual therapy], 56692 [therapeutic activities], 14709 [unattended electrical stimulation], and 51369 [vasopneumatic device]    PHILIP MILES, PT

## 2024-07-22 ENCOUNTER — OFFICE VISIT (OUTPATIENT)
Dept: ORTHOPEDICS | Facility: CLINIC | Age: 49
End: 2024-07-22
Payer: OTHER MISCELLANEOUS

## 2024-07-22 ENCOUNTER — CLINICAL SUPPORT (OUTPATIENT)
Dept: REHABILITATION | Facility: HOSPITAL | Age: 49
End: 2024-07-22
Payer: OTHER MISCELLANEOUS

## 2024-07-22 DIAGNOSIS — S83.241D ACUTE MEDIAL MENISCUS TEAR OF RIGHT KNEE, SUBSEQUENT ENCOUNTER: Primary | ICD-10-CM

## 2024-07-22 PROCEDURE — 99212 OFFICE O/P EST SF 10 MIN: CPT | Mod: PBBFAC | Performed by: ORTHOPAEDIC SURGERY

## 2024-07-22 PROCEDURE — 99999 PR PBB SHADOW E&M-EST. PATIENT-LVL II: CPT | Mod: PBBFAC,,, | Performed by: ORTHOPAEDIC SURGERY

## 2024-07-22 PROCEDURE — 99214 OFFICE O/P EST MOD 30 MIN: CPT | Mod: S$PBB,,, | Performed by: ORTHOPAEDIC SURGERY

## 2024-07-22 PROCEDURE — 97530 THERAPEUTIC ACTIVITIES: CPT

## 2024-07-22 PROCEDURE — 97110 THERAPEUTIC EXERCISES: CPT

## 2024-07-22 PROCEDURE — 97112 NEUROMUSCULAR REEDUCATION: CPT

## 2024-07-22 PROCEDURE — 97016 VASOPNEUMATIC DEVICE THERAPY: CPT

## 2024-07-22 NOTE — PROGRESS NOTES
"  [facility]   Physical Therapy Treatment Note     Name: Cole Herrera  Clinic Number: [id number]    Therapy Diagnosis: Acute medial meniscus tear of right knee    Physician: Joshua Dahl MD    Visit Date: 7/22/2024     Physician Orders: PT Eval and Treat   Medical Diagnosis from Referral: Acute medial meniscus tear of right knee  Evaluation Date: 7/15/2024  Authorization Period Expiration: 6/26/2024  Plan of Care Expiration: 9/13/2024  Visit # / Visits authorized: 2/ 16   FOTO: 46/100     Precautions: Diabetes      PTA Visit #: 0    Time In: 2:28 pm   Time Out: 3:25 pm   Total Billable Time: 53 minutes    Functional Level at time of Evaluation: patient is having constant pain with difficulty walking, standing extended periods of time, getting up from chair and negotiating stairs.     Subjective     Pt reports: Knee is painful today.  She was compliant with home exercise program.    Pain: 7/10  Location: right knee      Objective       Cole received therapeutic exercises to develop strength, endurance, ROM, and flexibility for 15 minutes including:    Knee Exercises        Bike/Nustep  5 minutes on Nustep   Calf Stretch 4 x 15 seconds    Hamstring Stretch 4 x 15 seconds    Quad Stretch 4 x 15 seconds                Cybex Knee Extension     Cybex Hamstring Curls 2 x 10 with 4 plates   Cybex Hip - Abduction bilateral  2 x 10 with 2 plates   Cybex Hip - Flexion      Cybex Hip - Extension                    Therapeutic Activity x  20 minutes  Forward step ups 6" 2 x 10  Heel raises 2 x 10  Squats  x 10  Cybex leg press bilateral 2 x 10 with 4 plates  Cybex leg press single 2 x 10 with 3 plates - NTV     Neuro-re-ed x 8 minutes     Lateral  step downs 4"  x 10  Forward step downs 4" x 10  Cable TKE's 2 x 10 with 2-3 second hold with 4 plates       Game Ready x 10 minutes to Right Knee      Home Exercises Provided and Patient Education Provided     Education provided: " Continue with prior Home Exercise Program     Written Home Exercises Provided: yes.  Exercises were reviewed and Cole was able to demonstrate them prior to the end of the session.  Cole demonstrated good  understanding of the education provided.     See EMR under Patient Instructions for exercises provided 7/15/2024.    Assessment      Today is the patient's first treatment after Evaluation. Therapist initiated the Plan of Care and instructed the patient on proper form for all exercises. She has significant pain in the Right Knee with all activity that increases especially with Extension. She was able to perform forward step ups and lateral step downs today from 4 inch step but required Upper Extremity support to help offload her weight. We are working on quad strengthening and knee Range of Motion as pain allows in preparation for surgical intervention. Ended session with Game Ready to Right Knee to help decrease pain, inflammation, and edema as able. Sup Visit performed today with SIMÓN Velasquez and SIMÓN Ng.  All goals and treatment plan reviewed. Will work toward completion of all goals set.          PMH at time of Evaluation :   Cole is a 48 y.o. female referred to outpatient Physical Therapy with a medical diagnosis of Acute medial meniscus tear of right knee. Patient presents with Right knee pain, edema, abnormal gait, impaired balance, positive tests for medial meniscus tear, decreased right knee motion and weakness of right lower extremity. 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 MRI was performed which confirmed medial meniscus tear. Patient's Worker's Comp requires 6 weeks of therapy to approve surgery. Patient needs surgical intervention. Will work on improving motion and strengthening without increased joint irritation to get approval for surgery. Patient will  require Physical Therapy Intervention to address all deficits and work toward completion of all goals set. Therapist will refer patient back to MD upon completion of Therapy for further assessment and surgical intervention if pain and dysfunction persist.          Cole Is not progressing well towards her goals.   Pt prognosis is Guarded.     Pt will continue to benefit from skilled outpatient physical therapy to address the deficits listed in the problem list box on initial evaluation, provide pt/family education and to maximize pt's level of independence in the home and community environment.     Pt's spiritual, cultural and educational needs considered and pt agreeable to plan of care and goals.     Anticipated Barriers for therapy: Patient needs surgical intervention to repair meniscus tear.    Goals:  Short Term Goals: 4 weeks   1. Independent with Home Exercise Program   2. Increase Right Knee Active Range of Motion to 0 Degrees to 120 Degrees  3. Increase Right Knee Strength to grossly 4+/5  4. Patient will ambulate 500 feet with Normal Gait pattern with complaints of pain Less than or Equal to 4/10.       Long Term Goals: 8 weeks   1. Patient will increase Right Knee Strength to grossly 5/5  2. Patient will ambulate 1000+ feet with complaints of pain Less than or Equal to 2/10.     Plan     Plan of care Certification: 7/15/2024 to 9/13/2024.     Outpatient Physical Therapy 2 times weekly for 8 weeks to include the following interventions: 24660 [therapeutic exercise], 23074 [neuromuscular re-education], 23452 [manual therapy], 17051 [therapeutic activities], 41428 [unattended electrical stimulation], and 36990 [vasopneumatic device]    JAIME MILES, PT, DPT   7/22/2024

## 2024-07-22 NOTE — PROGRESS NOTES
CLINIC NOTE       Chief Complaint   Patient presents with    Right Knee - Pain        Cole Herrera is a 48 y.o. female seen today for recheck of her right knee.  She continues to be symptomatic with adamantly medial knee pain.  She was been set up with physical therapy and we will start today at 2:30 a.m..  She was given work restrictions for no carrying greater than 5 lb.  No squatting or stair negotiation.  Recheck after completion of physical therapy.      Past Medical History:   Diagnosis Date    Allergy     Depression     Diabetes mellitus, type 2     Epigastric pain 05/11/2021    GERD (gastroesophageal reflux disease)     Hypertension     Sickle cell anemia     trait not anemia     Family History   Problem Relation Name Age of Onset    Hypertension Mother Claudette Laphand     Diabetes Mother [place] Laphand     Hyperlipidemia Mother Claudette Laphand     Glaucoma Mother Claudette Laphand     Arthritis Mother Claudette Laphand     Cancer Father Jj Davis      Current Outpatient Medications on File Prior to Visit   Medication Sig Dispense Refill    atorvastatin (LIPITOR) 10 MG tablet Take 1 tablet (10 mg total) by mouth once daily. 90 tablet 3    citalopram (CELEXA) 20 MG tablet Take 1 tablet by mouth once daily 90 tablet 3    cyclobenzaprine (FLEXERIL) 10 MG tablet Take 10 mg by mouth 3 (three) times daily.      fluconazole (DIFLUCAN) 150 MG Tab Take 1 tablet (150 mg total) by mouth once a week. 2 tablet 0    gabapentin (NEURONTIN) 300 MG capsule Take 1 capsule (300 mg total) by mouth once daily. 90 capsule 3    insulin degludec (TRESIBA FLEXTOUCH U-100) 100 unit/mL (3 mL) InPn Inject 60 Units into the skin nightly.      metFORMIN (GLUCOPHAGE) 850 MG tablet Take 1 tablet (850 mg total) by mouth 2 (two) times daily with meals. 180 tablet 1    naproxen (NAPROSYN) 500 MG tablet Take 1 tablet (500 mg total) by mouth 2 (two) times daily. 30 tablet 0    NOVOLOG FLEXPEN U-100 INSULIN 100 unit/mL (3 mL) InPn pen Inject  40 Units into the skin 3 (three) times daily.      pantoprazole (PROTONIX) 40 MG tablet Take 1 tablet (40 mg total) by mouth once daily. 90 tablet 3    rOPINIRole (REQUIP) 0.25 MG tablet Take 1 tablet (0.25 mg total) by mouth every evening. 30 tablet 3    tirzepatide (MOUNJARO) 2.5 mg/0.5 mL PnIj Inject 2.5 mg into the skin every 7 days. 12 Pen 3    triamterene-hydrochlorothiazide 37.5-25 mg (MAXZIDE-25) 37.5-25 mg per tablet Take 1 tablet by mouth once daily 90 tablet 3     No current facility-administered medications on file prior to visit.       ROS     There were no vitals filed for this visit.    Past Surgical History:   Procedure Laterality Date    APPENDECTOMY      CARPAL TUNNEL RELEASE      CARPAL TUNNEL RELEASE       SECTION      CHOLECYSTECTOMY      ESOPHAGOGASTRODUODENOSCOPY  2021    HYSTERECTOMY          Review of patient's allergies indicates:   Allergen Reactions    Phenergan plain Other (See Comments)     Pt states feeling 'funny'        Ortho Exam     Radiographic Examination:    Technique:    Findings:    Impression:   See Above    Assessment and Plan  Patient Active Problem List    Diagnosis Date Noted    Acute medial meniscus tear of right knee 2024    Acute pain of right shoulder 2023    Sickle cell trait 2023    Fatigue 2023    Acute superficial gastritis without hemorrhage     Constipation 2021    ESTES (nonalcoholic steatohepatitis) 06/10/2021    Restless legs 06/10/2021    Elevated liver enzymes 2021    Fatty liver 2021    Epigastric pain 2021    Esophageal dysphagia 2021    Type 2 diabetes mellitus without complication, without long-term current use of insulin 2021    Essential hypertension 2021    Gastroesophageal reflux disease 2021    Hyperlipidemia 2021    Obesity 2021    Anxiety 2021    Depression 2021          Joshua Dahl M.D.

## 2024-07-24 ENCOUNTER — CLINICAL SUPPORT (OUTPATIENT)
Dept: REHABILITATION | Facility: HOSPITAL | Age: 49
End: 2024-07-24
Payer: OTHER MISCELLANEOUS

## 2024-07-24 DIAGNOSIS — S83.241D ACUTE MEDIAL MENISCUS TEAR OF RIGHT KNEE, SUBSEQUENT ENCOUNTER: Primary | ICD-10-CM

## 2024-07-24 PROCEDURE — 97016 VASOPNEUMATIC DEVICE THERAPY: CPT | Mod: CQ

## 2024-07-24 PROCEDURE — 97530 THERAPEUTIC ACTIVITIES: CPT | Mod: CQ

## 2024-07-24 PROCEDURE — 97110 THERAPEUTIC EXERCISES: CPT | Mod: CQ

## 2024-07-24 NOTE — PROGRESS NOTES
" [facility]   Physical Therapy Treatment Note     Name: Cole Herrera  Clinic Number: [id number]    Therapy Diagnosis: Acute medial meniscus tear of right knee    Physician: Joshua Dahl MD    Visit Date: 7/24/2024     Physician Orders: PT Eval and Treat   Medical Diagnosis from Referral: Acute medial meniscus tear of right knee  Evaluation Date: 7/15/2024  Authorization Period Expiration: 6/26/2024  Plan of Care Expiration: 9/13/2024  Visit # / Visits authorized: 3 / 16   FOTO: 46/100     Precautions: Diabetes      PTA Visit #: 1/5    Time In: 2:30 pm   Time Out: 3:20 pm   Total Billable Time: 50 minutes    Functional Level at time of Evaluation: patient is having constant pain with difficulty walking, standing extended periods of time, getting up from chair and negotiating stairs.     Subjective     Pt reports: says she is doing okay but knee is still painful.  She was compliant with home exercise program.    Pain: 6/10  Location: right knee      Objective     Knee extension AROM -1 deg  QUAD LAG: -6    Cole received therapeutic exercises to develop strength, endurance, ROM, and flexibility for 23 minutes including:    Knee Exercises        Bike/Nustep  5 minutes on Nustep   Calf Stretch 4 x 15 seconds    Hamstring Stretch 4 x 15 seconds    Quad Stretch 4 x 15 seconds                Cybex Knee Extension     Cybex Hamstring Curls 2 x 10 with 4 plates   Cybex Hip - Abduction bilateral  2 x 10 with 2 plates   Cybex Hip - Flexion      Cybex Hip - Extension                 Cybex leg press bilateral 2 x 10 with 4 plates  Cybex leg press single 2 x 10 with 3 plates        Therapeutic Activity x  12 minutes  Forward step ups 6" 2 x 10  Heel raises 2 x 10  Squats  x 20  Lateral  step downs 6"  x 10     Neuro-re-ed x  0 minutes     Forward step downs 4" x 10 NTV  Cable TKE's 2 x 10 with 2-3 second hold with 4 plates NTV      Game Ready x 15 minutes to Right Knee      Home " Exercises Provided and Patient Education Provided     Education provided: Continue with prior Home Exercise Program     Written Home Exercises Provided: yes.  Exercises were reviewed and Cole was able to demonstrate them prior to the end of the session.  Cole demonstrated good  understanding of the education provided.     See EMR under Patient Instructions for exercises provided 7/15/2024.    Assessment     Patient had complaints of pain in her knee upon arrival. Patient's knee extension measured -1 deg AROM and her quad lag measured -6 deg. Patient tolerated the session fairly well with acute complaints of pain noted. Ended treatment with Game Ready to sooth pain.         PMH at time of Evaluation :   Cole is a 48 y.o. female referred to outpatient Physical Therapy with a medical diagnosis of Acute medial meniscus tear of right knee. Patient presents with Right knee pain, edema, abnormal gait, impaired balance, positive tests for medial meniscus tear, decreased right knee motion and weakness of right lower extremity. 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 MRI was performed which confirmed medial meniscus tear. Patient's Worker's Comp requires 6 weeks of therapy to approve surgery. Patient needs surgical intervention. Will work on improving motion and strengthening without increased joint irritation to get approval for surgery. Patient will require Physical Therapy Intervention to address all deficits and work toward completion of all goals set. Therapist will refer patient back to MD upon completion of Therapy for further assessment and surgical intervention if pain and dysfunction persist.          Cole Is not progressing well towards her goals.   Pt prognosis is Guarded.     Pt will continue to benefit from skilled outpatient physical therapy to address the deficits listed in the problem  list box on initial evaluation, provide pt/family education and to maximize pt's level of independence in the home and community environment.     Pt's spiritual, cultural and educational needs considered and pt agreeable to plan of care and goals.     Anticipated Barriers for therapy: Patient needs surgical intervention to repair meniscus tear.    Goals:  Short Term Goals: 4 weeks   1. Independent with Home Exercise Program   2. Increase Right Knee Active Range of Motion to 0 Degrees to 120 Degrees  3. Increase Right Knee Strength to grossly 4+/5  4. Patient will ambulate 500 feet with Normal Gait pattern with complaints of pain Less than or Equal to 4/10.       Long Term Goals: 8 weeks   1. Patient will increase Right Knee Strength to grossly 5/5  2. Patient will ambulate 1000+ feet with complaints of pain Less than or Equal to 2/10.     Plan     Plan of care Certification: 7/15/2024 to 9/13/2024.     Outpatient Physical Therapy 2 times weekly for 8 weeks to include the following interventions: 10239 [therapeutic exercise], 50049 [neuromuscular re-education], 63417 [manual therapy], 39203 [therapeutic activities], 53019 [unattended electrical stimulation], and 31512 [vasopneumatic device]    Fredy Cueto, PTA  7/24/2024

## 2024-07-29 ENCOUNTER — CLINICAL SUPPORT (OUTPATIENT)
Dept: REHABILITATION | Facility: HOSPITAL | Age: 49
End: 2024-07-29
Payer: OTHER MISCELLANEOUS

## 2024-07-29 DIAGNOSIS — S83.241D ACUTE MEDIAL MENISCUS TEAR OF RIGHT KNEE, SUBSEQUENT ENCOUNTER: Primary | ICD-10-CM

## 2024-07-29 PROCEDURE — 97110 THERAPEUTIC EXERCISES: CPT | Mod: CQ

## 2024-07-29 PROCEDURE — 97112 NEUROMUSCULAR REEDUCATION: CPT | Mod: CQ

## 2024-07-29 PROCEDURE — 97016 VASOPNEUMATIC DEVICE THERAPY: CPT | Mod: CQ

## 2024-07-29 PROCEDURE — 97530 THERAPEUTIC ACTIVITIES: CPT | Mod: CQ

## 2024-07-29 NOTE — PROGRESS NOTES
[facility]   Physical Therapy Treatment Note     Name: Cole Herrera  Clinic Number: [id number]    Therapy Diagnosis: Acute medial meniscus tear of right knee    Physician: Joshua Dahl MD    Visit Date: 7/29/2024     Physician Orders: PT Eval and Treat   Medical Diagnosis from Referral: Acute medial meniscus tear of right knee  Evaluation Date: 7/15/2024  Authorization Period Expiration: 6/26/2024  Plan of Care Expiration: 9/13/2024  Visit # / Visits authorized: 3 / 16   FOTO: 46/100     Precautions: Diabetes      PTA Visit #: 1/5    Time In: 2:30 pm   Time Out: 3:24 pm   Total Billable Time: 54 billable minutes    Functional Level at time of Evaluation: patient is having constant pain with difficulty walking, standing extended periods of time, getting up from chair and negotiating stairs.     Subjective     Pt reports: says her knee is more painful today   She was compliant with home exercise program.    Pain: 7/10  Location: right knee      Objective       Cole received therapeutic exercises to develop strength, endurance, ROM, and flexibility for 9 minutes including:    Knee Exercises        Bike/Nustep  5 minutes on Nustep   Calf Stretch 4 x 15 seconds    Hamstring Stretch 4 x 15 seconds    Quad Stretch 4 x 15 seconds                Cybex Knee Extension     Cybex Hip - Flexion      Cybex Hip - Extension      Prone Quad Stretch  4x20 sec hold             Therapeutic Activity x  8 minutes  Heel raises 3 x 10  Hip Abd (B) 20x      Neuro-re-ed x  23 minutes  QS with green ball x20  SLR with black strap x20  Prone QS with 10 sec hold x10  Swiss Ball HS Flexion Rolls 20x     Cable TKE's 2 x 10 with 2-3 second hold with 3 plates       Game Ready x 15 minutes to Right Knee      Home Exercises Provided and Patient Education Provided     Education provided: Continue with prior Home Exercise Program     Written Home Exercises Provided: yes.  Exercises were reviewed and  Cole was able to demonstrate them prior to the end of the session.  Cole demonstrated good  understanding of the education provided.     See EMR under Patient Instructions for exercises provided 7/15/2024.    Assessment     Patient complains of pain in her knee upon arrival. Today's treatment mainly focused on mobility and pain reduction. Patient tolerated the session with mild complaints of difficulty and pain noted. Stiff into knee flexion >90 degrees due to increased shear on meniscus at this angle. Ended treatment with Game Ready for 15 minutes to soothe pain.         PMH at time of Evaluation :   Cole is a 48 y.o. female referred to outpatient Physical Therapy with a medical diagnosis of Acute medial meniscus tear of right knee. Patient presents with Right knee pain, edema, abnormal gait, impaired balance, positive tests for medial meniscus tear, decreased right knee motion and weakness of right lower extremity. 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 MRI was performed which confirmed medial meniscus tear. Patient's Worker's Comp requires 6 weeks of therapy to approve surgery. Patient needs surgical intervention. Will work on improving motion and strengthening without increased joint irritation to get approval for surgery. Patient will require Physical Therapy Intervention to address all deficits and work toward completion of all goals set. Therapist will refer patient back to MD upon completion of Therapy for further assessment and surgical intervention if pain and dysfunction persist.          Cole Is not progressing well towards her goals.   Pt prognosis is Guarded.     Pt will continue to benefit from skilled outpatient physical therapy to address the deficits listed in the problem list box on initial evaluation, provide pt/family education and to maximize pt's level of independence  in the home and community environment.     Pt's spiritual, cultural and educational needs considered and pt agreeable to plan of care and goals.     Anticipated Barriers for therapy: Patient needs surgical intervention to repair meniscus tear.    Goals:  Short Term Goals: 4 weeks   1. Independent with Home Exercise Program   2. Increase Right Knee Active Range of Motion to 0 Degrees to 120 Degrees  3. Increase Right Knee Strength to grossly 4+/5  4. Patient will ambulate 500 feet with Normal Gait pattern with complaints of pain Less than or Equal to 4/10.       Long Term Goals: 8 weeks   1. Patient will increase Right Knee Strength to grossly 5/5  2. Patient will ambulate 1000+ feet with complaints of pain Less than or Equal to 2/10.     Plan     Plan of care Certification: 7/15/2024 to 9/13/2024.     Outpatient Physical Therapy 2 times weekly for 8 weeks to include the following interventions: 14956 [therapeutic exercise], 43630 [neuromuscular re-education], 60819 [manual therapy], 69528 [therapeutic activities], 75102 [unattended electrical stimulation], and 99849 [vasopneumatic device]    Fredy Cueto, DILLAN  7/29/2024

## 2024-07-31 ENCOUNTER — CLINICAL SUPPORT (OUTPATIENT)
Dept: REHABILITATION | Facility: HOSPITAL | Age: 49
End: 2024-07-31
Payer: OTHER MISCELLANEOUS

## 2024-07-31 DIAGNOSIS — S83.241D ACUTE MEDIAL MENISCUS TEAR OF RIGHT KNEE, SUBSEQUENT ENCOUNTER: Primary | ICD-10-CM

## 2024-07-31 PROCEDURE — 97110 THERAPEUTIC EXERCISES: CPT | Mod: CQ

## 2024-07-31 PROCEDURE — 97112 NEUROMUSCULAR REEDUCATION: CPT | Mod: CQ

## 2024-07-31 NOTE — PROGRESS NOTES
[facility]   Physical Therapy Treatment Note     Name: Cole Herrera  Clinic Number: [id number]    Therapy Diagnosis: Acute medial meniscus tear of right knee    Physician: Joshua Dahl MD    Visit Date: 7/31/2024     Physician Orders: PT Eval and Treat   Medical Diagnosis from Referral: Acute medial meniscus tear of right knee  Evaluation Date: 7/15/2024  Authorization Period Expiration: 6/26/2024  Plan of Care Expiration: 9/13/2024  Visit # / Visits authorized: 5 / 16   FOTO: 46/100     Precautions: Diabetes      PTA Visit #: 3/5    Time In: 2:35 pm   Time Out: 3:08 pm   Total Billable Time: 33 billable minutes    Functional Level at time of Evaluation: patient is having constant pain with difficulty walking, standing extended periods of time, getting up from chair and negotiating stairs.     Subjective     Pt reports: Knee still has pain but is more painful with flexion than extension  She was compliant with home exercise program.    Pain: 5/10  Location: right knee      Objective       Cole received therapeutic exercises to develop strength, endurance, ROM, and flexibility for 23 minutes including:    Knee Exercises        Bike/Nustep  5 minutes on Nustep   Calf Stretch 4 x 15 seconds    Hamstring Stretch 4 x 15 seconds    Quad Stretch 4 x 15 seconds                Cybex Knee Extension     Cybex Hamstring Curls 2 x 10 with 4 plates   Cybex Hip - Flexion      Cybex Hip - Extension           Cybex leg press bilateral 2 x 10 with 4 plates (modified ranges)  Cybex leg press single 2 x 10 with 2 plates    Heel raises 3 x 10      Therapeutic Activity x  0 minutes     Neuro-re-ed x  10 minutes  QS with turquoise ball x20  SLR with black strap x20  Prone QS with 10 sec hold x10    Cable TKE's 2 x 10 with 2-3 second hold with 3 plates           Home Exercises Provided and Patient Education Provided     Education provided: Continue with prior Home Exercise Program      Written Home Exercises Provided: yes.  Exercises were reviewed and Cole was able to demonstrate them prior to the end of the session.  Cole demonstrated good  understanding of the education provided.     See EMR under Patient Instructions for exercises provided 7/15/2024.    Assessment     Patient's knee is still painful. Today's treatment focused on strengthening and performing exercises that did not increase the pain. Patient tolerated treatment fairly well with acute complaints of pain noted. Modified ranges of leg press/HS curls to avoid end range flexion. Continue to progress strengthening exercises as tolerated.         PMH at time of Evaluation :   Cole is a 48 y.o. female referred to outpatient Physical Therapy with a medical diagnosis of Acute medial meniscus tear of right knee. Patient presents with Right knee pain, edema, abnormal gait, impaired balance, positive tests for medial meniscus tear, decreased right knee motion and weakness of right lower extremity. 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 MRI was performed which confirmed medial meniscus tear. Patient's Worker's Comp requires 6 weeks of therapy to approve surgery. Patient needs surgical intervention. Will work on improving motion and strengthening without increased joint irritation to get approval for surgery. Patient will require Physical Therapy Intervention to address all deficits and work toward completion of all goals set. Therapist will refer patient back to MD upon completion of Therapy for further assessment and surgical intervention if pain and dysfunction persist.          Cole Is not progressing well towards her goals.   Pt prognosis is Guarded.     Pt will continue to benefit from skilled outpatient physical therapy to address the deficits listed in the problem list box on initial evaluation, provide  pt/family education and to maximize pt's level of independence in the home and community environment.     Pt's spiritual, cultural and educational needs considered and pt agreeable to plan of care and goals.     Anticipated Barriers for therapy: Patient needs surgical intervention to repair meniscus tear.    Goals:  Short Term Goals: 4 weeks   1. Independent with Home Exercise Program   2. Increase Right Knee Active Range of Motion to 0 Degrees to 120 Degrees  3. Increase Right Knee Strength to grossly 4+/5  4. Patient will ambulate 500 feet with Normal Gait pattern with complaints of pain Less than or Equal to 4/10.       Long Term Goals: 8 weeks   1. Patient will increase Right Knee Strength to grossly 5/5  2. Patient will ambulate 1000+ feet with complaints of pain Less than or Equal to 2/10.     Plan     Plan of care Certification: 7/15/2024 to 9/13/2024.     Outpatient Physical Therapy 2 times weekly for 8 weeks to include the following interventions: 72624 [therapeutic exercise], 57579 [neuromuscular re-education], 91242 [manual therapy], 28179 [therapeutic activities], 58236 [unattended electrical stimulation], and 24377 [vasopneumatic device]    Fredy Cueto, PTA  7/31/2024

## 2024-08-05 ENCOUNTER — CLINICAL SUPPORT (OUTPATIENT)
Dept: REHABILITATION | Facility: HOSPITAL | Age: 49
End: 2024-08-05
Payer: OTHER MISCELLANEOUS

## 2024-08-05 DIAGNOSIS — S83.241D ACUTE MEDIAL MENISCUS TEAR OF RIGHT KNEE, SUBSEQUENT ENCOUNTER: Primary | ICD-10-CM

## 2024-08-05 PROCEDURE — 97112 NEUROMUSCULAR REEDUCATION: CPT

## 2024-08-05 PROCEDURE — 97530 THERAPEUTIC ACTIVITIES: CPT

## 2024-08-05 PROCEDURE — 97110 THERAPEUTIC EXERCISES: CPT

## 2024-08-07 ENCOUNTER — CLINICAL SUPPORT (OUTPATIENT)
Dept: REHABILITATION | Facility: HOSPITAL | Age: 49
End: 2024-08-07
Payer: OTHER MISCELLANEOUS

## 2024-08-07 DIAGNOSIS — S83.241D ACUTE MEDIAL MENISCUS TEAR OF RIGHT KNEE, SUBSEQUENT ENCOUNTER: Primary | ICD-10-CM

## 2024-08-07 PROCEDURE — 97112 NEUROMUSCULAR REEDUCATION: CPT | Mod: CQ

## 2024-08-07 PROCEDURE — 97110 THERAPEUTIC EXERCISES: CPT | Mod: CQ

## 2024-08-07 PROCEDURE — 97530 THERAPEUTIC ACTIVITIES: CPT | Mod: CQ

## 2024-08-12 ENCOUNTER — CLINICAL SUPPORT (OUTPATIENT)
Dept: REHABILITATION | Facility: HOSPITAL | Age: 49
End: 2024-08-12
Payer: OTHER MISCELLANEOUS

## 2024-08-12 DIAGNOSIS — S83.241D ACUTE MEDIAL MENISCUS TEAR OF RIGHT KNEE, SUBSEQUENT ENCOUNTER: Primary | ICD-10-CM

## 2024-08-12 PROCEDURE — 97530 THERAPEUTIC ACTIVITIES: CPT

## 2024-08-12 PROCEDURE — 97110 THERAPEUTIC EXERCISES: CPT

## 2024-08-12 PROCEDURE — 97112 NEUROMUSCULAR REEDUCATION: CPT

## 2024-08-12 NOTE — PROGRESS NOTES
"  [facility]   Physical Therapy Treatment Note     Name: Cole Herrera  Clinic Number: [id number]    Therapy Diagnosis: Acute medial meniscus tear of right knee    Physician: Joshua Dahl MD    Visit Date: 8/12/2024     Physician Orders: PT Eval and Treat   Medical Diagnosis from Referral: Acute medial meniscus tear of right knee  Evaluation Date: 7/15/2024  Authorization Period Expiration: 6/26/2024  Plan of Care Expiration: 9/13/2024  Visit # / Visits authorized: 8 / 16   FOTO: 46/100     Precautions: Diabetes      PTA Visit #: 1/5    Time In: 1:00 pm   Time Out: 1:38 pm   Total Billable Time: 38 billable minutes    Functional Level at time of Evaluation: patient is having constant pain with difficulty walking, standing extended periods of time, getting up from chair and negotiating stairs.     Subjective     Pt reports: continued consistent pain. Patient reports that pain woke her up Saturday night at 2:00 am  She was compliant with home exercise program.    Pain: 6/10  Location: right knee      Objective       Cole received therapeutic exercises to develop strength, endurance, ROM, and flexibility for 15 minutes including:    Knee Exercises        Bike/Nustep  5 minutes on bike   Calf Stretch 4 x 15 seconds    Hamstring Stretch 4 x 15 seconds    Quad Stretch 4 x 15 seconds                Cybex Knee Extension     Cybex Hamstring Curls 3 x 10 with 5 plates   Cybex Hip - Abduction bilateral  2 x 10 with 2 plates   Cybex Hip - Flexion  2 x 10 with 2 plates   Cybex Hip - Extension                 Therapeutic Activity x  15 minutes  Forward step ups 6" 2 x 10  Squats  2 x 10  Cybex leg press bilateral 3 x 10 with 6 plates (modified ranges)  Cybex leg press single 3 x 10 with 3 plates    Heel raises 3 x 10     Neuro-re-ed x  8 minutes  SLS on Foam 4x30 sec  Cable TKE's 2 x 10 with 6 plates and 3 second hold  Lateral  step downs 6" 2 x 10          Home Exercises Provided " and Patient Education Provided     Education provided: Continue with prior Home Exercise Program     Written Home Exercises Provided: yes.  Exercises were reviewed and Cole was able to demonstrate them prior to the end of the session.  Cole demonstrated good  understanding of the education provided.     See EMR under Patient Instructions for exercises provided 7/15/2024.    Assessment     Patient reports that pain woke her up Saturday night at 2:00 am. Patient's pain continues to be a consistent 6/10. Increased weight on cable TKE's and increased time on single-leg stance today. Patient demonstrates improving strength but no improvement in pain. Feel that patient needs surgical intervention to address pain and dysfunction.         PMH at time of Evaluation :   Cole is a 48 y.o. female referred to outpatient Physical Therapy with a medical diagnosis of Acute medial meniscus tear of right knee. Patient presents with Right knee pain, edema, abnormal gait, impaired balance, positive tests for medial meniscus tear, decreased right knee motion and weakness of right lower extremity. 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 MRI was performed which confirmed medial meniscus tear. Patient's Worker's Comp requires 6 weeks of therapy to approve surgery. Patient needs surgical intervention. Will work on improving motion and strengthening without increased joint irritation to get approval for surgery. Patient will require Physical Therapy Intervention to address all deficits and work toward completion of all goals set. Therapist will refer patient back to MD upon completion of Therapy for further assessment and surgical intervention if pain and dysfunction persist.          Cole Is not progressing well towards her goals.   Pt prognosis is Guarded.     Pt will continue to benefit from skilled outpatient  physical therapy to address the deficits listed in the problem list box on initial evaluation, provide pt/family education and to maximize pt's level of independence in the home and community environment.     Pt's spiritual, cultural and educational needs considered and pt agreeable to plan of care and goals.     Anticipated Barriers for therapy: Patient needs surgical intervention to repair meniscus tear.    Goals:  Short Term Goals: 4 weeks   1. Independent with Home Exercise Program   2. Increase Right Knee Active Range of Motion to 0 Degrees to 120 Degrees  3. Increase Right Knee Strength to grossly 4+/5  4. Patient will ambulate 500 feet with Normal Gait pattern with complaints of pain Less than or Equal to 4/10.       Long Term Goals: 8 weeks   1. Patient will increase Right Knee Strength to grossly 5/5  2. Patient will ambulate 1000+ feet with complaints of pain Less than or Equal to 2/10.     Plan     Plan of care Certification: 7/15/2024 to 9/13/2024.     Outpatient Physical Therapy 2 times weekly for 8 weeks to include the following interventions: 49820 [therapeutic exercise], 66818 [neuromuscular re-education], 39314 [manual therapy], 65629 [therapeutic activities], 67611 [unattended electrical stimulation], and 07437 [vasopneumatic device]    PHILIP MILES, PT  8/12/2024

## 2024-08-14 ENCOUNTER — CLINICAL SUPPORT (OUTPATIENT)
Dept: REHABILITATION | Facility: HOSPITAL | Age: 49
End: 2024-08-14
Payer: OTHER MISCELLANEOUS

## 2024-08-14 DIAGNOSIS — S83.241D ACUTE MEDIAL MENISCUS TEAR OF RIGHT KNEE, SUBSEQUENT ENCOUNTER: Primary | ICD-10-CM

## 2024-08-14 PROCEDURE — 97112 NEUROMUSCULAR REEDUCATION: CPT

## 2024-08-14 PROCEDURE — 97110 THERAPEUTIC EXERCISES: CPT

## 2024-08-14 PROCEDURE — 97530 THERAPEUTIC ACTIVITIES: CPT

## 2024-08-14 NOTE — PROGRESS NOTES
"  [facility]   Physical Therapy Treatment Note     Name: Cole Herrera  Clinic Number: [id number]    Therapy Diagnosis: Acute medial meniscus tear of right knee    Physician: Joshua Dahl MD    Visit Date: 8/14/2024     Physician Orders: PT Eval and Treat   Medical Diagnosis from Referral: Acute medial meniscus tear of right knee  Evaluation Date: 7/15/2024  Authorization Period Expiration: 6/26/2024  Plan of Care Expiration: 9/13/2024  Visit # / Visits authorized: 9 / 16   FOTO: 46/100     Precautions: Diabetes      PTA Visit #: 1/5    Time In: 12:58 pm   Time Out: 1:36 pm   Total Billable Time: 38 billable minutes    Functional Level at time of Evaluation: patient is having constant pain with difficulty walking, standing extended periods of time, getting up from chair and negotiating stairs.     Subjective     Pt reports: Slight decrease in pain to 5/10.  She was compliant with home exercise program.    Pain: 5/10  Location: right knee      Objective       Cole received therapeutic exercises to develop strength, endurance, ROM, and flexibility for 15 minutes including:    Knee Exercises        Bike/Nustep  5 minutes on Nustep   Calf Stretch 4 x 15 seconds    Hamstring Stretch 4 x 15 seconds    Quad Stretch 4 x 15 seconds                Cybex Knee Extension     Cybex Hamstring Curls 3 x 10 with 5 plates   Cybex Hip - Abduction bilateral  2 x 10 with 2 plates   Cybex Hip - Flexion  2 x 10 with 2 plates   Cybex Hip - Extension                 Therapeutic Activity x  15 minutes  Forward step ups 6" 2 x 10  Squats  2 x 10  Cybex leg press bilateral 3 x 10 with 6 plates (modified ranges)  Cybex leg press single 3 x 10 with 3 plates    Heel raises 3 x 10     Neuro-re-ed x  8 minutes  SLS on Foam 4x30 sec  Cable TKE's 2 x 10 with 6 plates and 3 second hold  Lateral  step downs 6" 2 x 10          Home Exercises Provided and Patient Education Provided     Education provided: " Continue with prior Home Exercise Program     Written Home Exercises Provided: yes.  Exercises were reviewed and oCle was able to demonstrate them prior to the end of the session.  Cole demonstrated good  understanding of the education provided.     See EMR under Patient Instructions for exercises provided 7/15/2024.    Assessment     Patient reports that she took something for pain so pain is little less today with rating of 5/10. Patient has shown improvements in strength and flexibility but pain continues to limit her activity and quality of life. Patient will see Dr. Dahl next Wednesday and hopefully WC will approve surgery so patient can start recovery and work without limitations. Will continue with PLAN OF CARE.        PMH at time of Evaluation :   Cole is a 48 y.o. female referred to outpatient Physical Therapy with a medical diagnosis of Acute medial meniscus tear of right knee. Patient presents with Right knee pain, edema, abnormal gait, impaired balance, positive tests for medial meniscus tear, decreased right knee motion and weakness of right lower extremity. 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 MRI was performed which confirmed medial meniscus tear. Patient's Worker's Comp requires 6 weeks of therapy to approve surgery. Patient needs surgical intervention. Will work on improving motion and strengthening without increased joint irritation to get approval for surgery. Patient will require Physical Therapy Intervention to address all deficits and work toward completion of all goals set. Therapist will refer patient back to MD upon completion of Therapy for further assessment and surgical intervention if pain and dysfunction persist.          Cole Is not progressing well towards her goals.   Pt prognosis is Guarded.     Pt will continue to benefit from skilled outpatient  physical therapy to address the deficits listed in the problem list box on initial evaluation, provide pt/family education and to maximize pt's level of independence in the home and community environment.     Pt's spiritual, cultural and educational needs considered and pt agreeable to plan of care and goals.     Anticipated Barriers for therapy: Patient needs surgical intervention to repair meniscus tear.    Goals:  Short Term Goals: 4 weeks   1. Independent with Home Exercise Program : Met  2. Increase Right Knee Active Range of Motion to 0 Degrees to 120 Degrees : Met  3. Increase Right Knee Strength to grossly 4+/5 : Met  4. Patient will ambulate 500 feet with Normal Gait pattern with complaints of pain Less than or Equal to 4/10. : Not met     Long Term Goals: 8 weeks   1. Patient will increase Right Knee Strength to grossly 5/5 : Ongoing  2. Patient will ambulate 1000+ feet with complaints of pain Less than or Equal to 2/10. : Ongoing    Plan     Plan of care Certification: 7/15/2024 to 9/13/2024.     Outpatient Physical Therapy 2 times weekly for 8 weeks to include the following interventions: 07689 [therapeutic exercise], 05785 [neuromuscular re-education], 01615 [manual therapy], 31963 [therapeutic activities], 88377 [unattended electrical stimulation], and 72577 [vasopneumatic device]    PHILIP MILES, PT  8/14/2024

## 2024-08-19 ENCOUNTER — OFFICE VISIT (OUTPATIENT)
Dept: ORTHOPEDICS | Facility: CLINIC | Age: 49
End: 2024-08-19
Payer: OTHER MISCELLANEOUS

## 2024-08-19 ENCOUNTER — CLINICAL SUPPORT (OUTPATIENT)
Dept: REHABILITATION | Facility: HOSPITAL | Age: 49
End: 2024-08-19
Payer: OTHER MISCELLANEOUS

## 2024-08-19 DIAGNOSIS — S83.241D ACUTE MEDIAL MENISCUS TEAR OF RIGHT KNEE, SUBSEQUENT ENCOUNTER: Primary | ICD-10-CM

## 2024-08-19 DIAGNOSIS — Z01.812 PRE-OPERATIVE LABORATORY EXAMINATION: Primary | ICD-10-CM

## 2024-08-19 DIAGNOSIS — S83.241D ACUTE MEDIAL MENISCUS TEAR OF RIGHT KNEE, SUBSEQUENT ENCOUNTER: ICD-10-CM

## 2024-08-19 PROCEDURE — 99213 OFFICE O/P EST LOW 20 MIN: CPT | Mod: PBBFAC | Performed by: ORTHOPAEDIC SURGERY

## 2024-08-19 PROCEDURE — 97112 NEUROMUSCULAR REEDUCATION: CPT | Mod: CQ

## 2024-08-19 PROCEDURE — 99999 PR PBB SHADOW E&M-EST. PATIENT-LVL III: CPT | Mod: PBBFAC,,, | Performed by: ORTHOPAEDIC SURGERY

## 2024-08-19 PROCEDURE — 97530 THERAPEUTIC ACTIVITIES: CPT | Mod: CQ

## 2024-08-19 PROCEDURE — 97110 THERAPEUTIC EXERCISES: CPT | Mod: CQ

## 2024-08-19 PROCEDURE — 99214 OFFICE O/P EST MOD 30 MIN: CPT | Mod: S$PBB,,, | Performed by: ORTHOPAEDIC SURGERY

## 2024-08-19 NOTE — PROGRESS NOTES
"  [facility]   Physical Therapy Treatment Note     Name: Cole Herrera  Clinic Number: [id number]    Therapy Diagnosis: Acute medial meniscus tear of right knee    Physician: Joshua Dahl MD    Visit Date: 8/19/2024     Physician Orders: PT Eval and Treat   Medical Diagnosis from Referral: Acute medial meniscus tear of right knee  Evaluation Date: 7/15/2024  Authorization Period Expiration: 6/26/2024  Plan of Care Expiration: 9/13/2024  Visit # / Visits authorized: 10 / 16   FOTO: 46/100     Precautions: Diabetes      PTA Visit #: 1/5    Time In: 1:00 pm   Time Out: 1:38 pm   Total Billable Time: 38 billable minutes    Functional Level at time of Evaluation: patient is having constant pain with difficulty walking, standing extended periods of time, getting up from chair and negotiating stairs.     Subjective     Pt reports: Slight decrease in pain to 5/10.  She was compliant with home exercise program.    Pain: 5/10  Location: right knee      Objective       Cole received therapeutic exercises to develop strength, endurance, ROM, and flexibility for 15 minutes including:    Knee Exercises        Bike/Nustep  5 minutes on Nustep   Calf Stretch 4 x 15 seconds    Hamstring Stretch 4 x 15 seconds    Quad Stretch 4 x 15 seconds                Cybex Knee Extension     Cybex Hamstring Curls 3 x 10 with 5 plates   Cybex Hip - Abduction bilateral  2 x 10 with 2 plates   Cybex Hip - Flexion  2 x 10 with 2 plates   Cybex Hip - Extension                 Therapeutic Activity x  15 minutes  Forward step ups 6" 2 x 10  Squats  2 x 10  Cybex leg press bilateral 3 x 10 with 6 plates (modified ranges)  Cybex leg press single 3 x 10 with 4 plates    Heel raises 3 x 10     Neuro-re-ed x  8 minutes  SLS on Foam 4x30 sec  Cable TKE's 2 x 10 with 6 plates and 3 second hold  Lateral  step downs 6" 2 x 10          Home Exercises Provided and Patient Education Provided     Education provided: " Continue with prior Home Exercise Program     Written Home Exercises Provided: yes.  Exercises were reviewed and Cole was able to demonstrate them prior to the end of the session.  Cole demonstrated good  understanding of the education provided.     See EMR under Patient Instructions for exercises provided 7/15/2024.    Assessment     Continued pain in R knee. RANGE OF MOTION is doing better but pain has not changed. Pt's strength is doing better but pain is still present. Pt has follow up with MD today and will find out more about if surgery has been approved.         PMH at time of Evaluation :   Cole is a 48 y.o. female referred to outpatient Physical Therapy with a medical diagnosis of Acute medial meniscus tear of right knee. Patient presents with Right knee pain, edema, abnormal gait, impaired balance, positive tests for medial meniscus tear, decreased right knee motion and weakness of right lower extremity. 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 MRI was performed which confirmed medial meniscus tear. Patient's Worker's Comp requires 6 weeks of therapy to approve surgery. Patient needs surgical intervention. Will work on improving motion and strengthening without increased joint irritation to get approval for surgery. Patient will require Physical Therapy Intervention to address all deficits and work toward completion of all goals set. Therapist will refer patient back to MD upon completion of Therapy for further assessment and surgical intervention if pain and dysfunction persist.          Cole Is not progressing well towards her goals.   Pt prognosis is Guarded.     Pt will continue to benefit from skilled outpatient physical therapy to address the deficits listed in the problem list box on initial evaluation, provide pt/family education and to maximize pt's level of independence in  the home and community environment.     Pt's spiritual, cultural and educational needs considered and pt agreeable to plan of care and goals.     Anticipated Barriers for therapy: Patient needs surgical intervention to repair meniscus tear.    Goals:  Short Term Goals: 4 weeks   1. Independent with Home Exercise Program : Met  2. Increase Right Knee Active Range of Motion to 0 Degrees to 120 Degrees : Met  3. Increase Right Knee Strength to grossly 4+/5 : Met  4. Patient will ambulate 500 feet with Normal Gait pattern with complaints of pain Less than or Equal to 4/10. : Not met     Long Term Goals: 8 weeks   1. Patient will increase Right Knee Strength to grossly 5/5 : Ongoing  2. Patient will ambulate 1000+ feet with complaints of pain Less than or Equal to 2/10. : Ongoing    Plan     Plan of care Certification: 7/15/2024 to 9/13/2024.     Outpatient Physical Therapy 2 times weekly for 8 weeks to include the following interventions: 72185 [therapeutic exercise], 16789 [neuromuscular re-education], 57930 [manual therapy], 61692 [therapeutic activities], 55227 [unattended electrical stimulation], and 89294 [vasopneumatic device]    Fredy Cueto, PTA  8/19/2024

## 2024-08-19 NOTE — PROGRESS NOTES
CLINIC NOTE       Chief Complaint   Patient presents with    Right Knee - Follow-up        Cole Herrera is a 48 y.o. female seen today for recheck of her right knee.  She was been followed for medial knee pain felt related to a work compensation injury.  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  On that day of injury she was seen in the emergency room where  X-rays 05/19/2024 showed minimal medial joint space narrowing and tiny osteophytes beginning from the medial aspect of the distal femur and upper tibial regions.  An MRI examination of the right knee was obtained he was 06/12/2024.  The findings he was suggested tear of the mesial root of the posterior horn of the medial meniscus.  Her insurance carrier required physical therapy.  She was now completed it but remains symptomatic.  We have discussed the option for arthroscopic surgical intervention.  Procedure was discussed in details.  The potential benefits and risks of surgery outlined to include but not limited to bleeding, infection, damage to blood vessels and nerves, need for further surgery, other risks and complications including even death.  Limitations guarding potential DJD were emphasized.    Past Medical History:   Diagnosis Date    Allergy     Depression     Diabetes mellitus, type 2     Epigastric pain 05/11/2021    GERD (gastroesophageal reflux disease)     Hypertension     Sickle cell anemia     trait not anemia     Family History   Problem Relation Name Age of Onset    Hypertension Mother [place] Lap[place]     Diabetes Mother [place] Laphand     Hyperlipidemia Mother Claud[place] Laphand     Glaucoma Mother Claudette Laphand     Arthritis Mother Claudette Laphand     Cancer Father Jj Davis      Current Outpatient Medications on File Prior to Visit   Medication Sig Dispense Refill    atorvastatin (LIPITOR) 10 MG tablet Take 1 tablet (10 mg total) by mouth once daily. 90 tablet 3    citalopram (CELEXA) 20 MG tablet Take 1  tablet by mouth once daily 90 tablet 3    cyclobenzaprine (FLEXERIL) 10 MG tablet Take 10 mg by mouth 3 (three) times daily.      fluconazole (DIFLUCAN) 150 MG Tab Take 1 tablet (150 mg total) by mouth once a week. 2 tablet 0    gabapentin (NEURONTIN) 300 MG capsule Take 1 capsule (300 mg total) by mouth once daily. 90 capsule 3    insulin degludec (TRESIBA FLEXTOUCH U-100) 100 unit/mL (3 mL) InPn Inject 60 Units into the skin nightly.      metFORMIN (GLUCOPHAGE) 850 MG tablet Take 1 tablet (850 mg total) by mouth 2 (two) times daily with meals. 180 tablet 1    naproxen (NAPROSYN) 500 MG tablet Take 1 tablet (500 mg total) by mouth 2 (two) times daily. 30 tablet 0    NOVOLOG FLEXPEN U-100 INSULIN 100 unit/mL (3 mL) InPn pen Inject 40 Units into the skin 3 (three) times daily.      pantoprazole (PROTONIX) 40 MG tablet Take 1 tablet (40 mg total) by mouth once daily. 90 tablet 3    rOPINIRole (REQUIP) 0.25 MG tablet Take 1 tablet (0.25 mg total) by mouth every evening. 30 tablet 3    tirzepatide (MOUNJARO) 2.5 mg/0.5 mL PnIj Inject 2.5 mg into the skin every 7 days. 12 Pen 3    triamterene-hydrochlorothiazide 37.5-25 mg (MAXZIDE-25) 37.5-25 mg per tablet Take 1 tablet by mouth once daily 90 tablet 3     No current facility-administered medications on file prior to visit.       ROS     There were no vitals filed for this visit.    Past Surgical History:   Procedure Laterality Date    APPENDECTOMY      CARPAL TUNNEL RELEASE      CARPAL TUNNEL RELEASE       SECTION      CHOLECYSTECTOMY      ESOPHAGOGASTRODUODENOSCOPY  2021    HYSTERECTOMY          Review of patient's allergies indicates:   Allergen Reactions    Phenergan plain Other (See Comments)     Pt states feeling 'funny'        Ortho Exam     Radiographic Examination:    Technique:    Findings:    Impression:   See Above    Assessment and Plan  Patient Active Problem List    Diagnosis Date Noted    Acute medial meniscus tear of right knee 2024     Acute pain of right shoulder 01/19/2023    Sickle cell trait 01/19/2023    Fatigue 01/19/2023    Acute superficial gastritis without hemorrhage     Constipation 07/06/2021    ESTES (nonalcoholic steatohepatitis) 06/10/2021    Restless legs 06/10/2021    Elevated liver enzymes 05/17/2021    Fatty liver 05/17/2021    Epigastric pain 05/11/2021    Esophageal dysphagia 05/11/2021    Type 2 diabetes mellitus without complication, without long-term current use of insulin 03/26/2021    Essential hypertension 03/26/2021    Gastroesophageal reflux disease 03/26/2021    Hyperlipidemia 03/26/2021    Obesity 03/26/2021    Anxiety 03/26/2021    Depression 03/26/2021          Joshua Dahl M.D.

## 2024-08-19 NOTE — H&P (VIEW-ONLY)
CLINIC NOTE       Chief Complaint   Patient presents with    Right Knee - Follow-up        Cole Herrera is a 48 y.o. female seen today for recheck of her right knee.  She was been followed for medial knee pain felt related to a work compensation injury.  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  On that day of injury she was seen in the emergency room where  X-rays 05/19/2024 showed minimal medial joint space narrowing and tiny osteophytes beginning from the medial aspect of the distal femur and upper tibial regions.  An MRI examination of the right knee was obtained he was 06/12/2024.  The findings he was suggested tear of the mesial root of the posterior horn of the medial meniscus.  Her insurance carrier required physical therapy.  She was now completed it but remains symptomatic.  We have discussed the option for arthroscopic surgical intervention.  Procedure was discussed in details.  The potential benefits and risks of surgery outlined to include but not limited to bleeding, infection, damage to blood vessels and nerves, need for further surgery, other risks and complications including even death.  Limitations guarding potential DJD were emphasized.    Past Medical History:   Diagnosis Date    Allergy     Depression     Diabetes mellitus, type 2     Epigastric pain 05/11/2021    GERD (gastroesophageal reflux disease)     Hypertension     Sickle cell anemia     trait not anemia     Family History   Problem Relation Name Age of Onset    Hypertension Mother [place]     Diabetes Mother [place]     Hyperlipidemia Mother Claudette Laphand     Glaucoma Mother [place]ette Laphand     Arthritis Mother [place]ette Laphand     Cancer Father Jj Davis      Current Outpatient Medications on File Prior to Visit   Medication Sig Dispense Refill    atorvastatin (LIPITOR) 10 MG tablet Take 1 tablet (10 mg total) by mouth once daily. 90 tablet 3    citalopram (CELEXA) 20 MG tablet Take 1  tablet by mouth once daily 90 tablet 3    cyclobenzaprine (FLEXERIL) 10 MG tablet Take 10 mg by mouth 3 (three) times daily.      fluconazole (DIFLUCAN) 150 MG Tab Take 1 tablet (150 mg total) by mouth once a week. 2 tablet 0    gabapentin (NEURONTIN) 300 MG capsule Take 1 capsule (300 mg total) by mouth once daily. 90 capsule 3    insulin degludec (TRESIBA FLEXTOUCH U-100) 100 unit/mL (3 mL) InPn Inject 60 Units into the skin nightly.      metFORMIN (GLUCOPHAGE) 850 MG tablet Take 1 tablet (850 mg total) by mouth 2 (two) times daily with meals. 180 tablet 1    naproxen (NAPROSYN) 500 MG tablet Take 1 tablet (500 mg total) by mouth 2 (two) times daily. 30 tablet 0    NOVOLOG FLEXPEN U-100 INSULIN 100 unit/mL (3 mL) InPn pen Inject 40 Units into the skin 3 (three) times daily.      pantoprazole (PROTONIX) 40 MG tablet Take 1 tablet (40 mg total) by mouth once daily. 90 tablet 3    rOPINIRole (REQUIP) 0.25 MG tablet Take 1 tablet (0.25 mg total) by mouth every evening. 30 tablet 3    tirzepatide (MOUNJARO) 2.5 mg/0.5 mL PnIj Inject 2.5 mg into the skin every 7 days. 12 Pen 3    triamterene-hydrochlorothiazide 37.5-25 mg (MAXZIDE-25) 37.5-25 mg per tablet Take 1 tablet by mouth once daily 90 tablet 3     No current facility-administered medications on file prior to visit.       ROS     There were no vitals filed for this visit.    Past Surgical History:   Procedure Laterality Date    APPENDECTOMY      CARPAL TUNNEL RELEASE      CARPAL TUNNEL RELEASE       SECTION      CHOLECYSTECTOMY      ESOPHAGOGASTRODUODENOSCOPY  2021    HYSTERECTOMY          Review of patient's allergies indicates:   Allergen Reactions    Phenergan plain Other (See Comments)     Pt states feeling 'funny'        Ortho Exam     Radiographic Examination:    Technique:    Findings:    Impression:   See Above    Assessment and Plan  Patient Active Problem List    Diagnosis Date Noted    Acute medial meniscus tear of right knee 2024     Acute pain of right shoulder 01/19/2023    Sickle cell trait 01/19/2023    Fatigue 01/19/2023    Acute superficial gastritis without hemorrhage     Constipation 07/06/2021    ESTES (nonalcoholic steatohepatitis) 06/10/2021    Restless legs 06/10/2021    Elevated liver enzymes 05/17/2021    Fatty liver 05/17/2021    Epigastric pain 05/11/2021    Esophageal dysphagia 05/11/2021    Type 2 diabetes mellitus without complication, without long-term current use of insulin 03/26/2021    Essential hypertension 03/26/2021    Gastroesophageal reflux disease 03/26/2021    Hyperlipidemia 03/26/2021    Obesity 03/26/2021    Anxiety 03/26/2021    Depression 03/26/2021          Joshua Dahl M.D.

## 2024-08-27 ENCOUNTER — TELEPHONE (OUTPATIENT)
Dept: ORTHOPEDICS | Facility: CLINIC | Age: 49
End: 2024-08-27
Payer: COMMERCIAL

## 2024-08-27 NOTE — TELEPHONE ENCOUNTER
----- Message from Airam Ashley sent at 8/27/2024  9:59 AM CDT -----  Regarding: worker comp  Who Called: Cole Herrera    Caller is requesting assistance/information from provider's office.    Symptoms (please be specific): wanted to know if workers comp was accepted        Preferred Method of Contact: Phone Call  Patient's Preferred Phone Number on File: [phone]   Best Call Back Number, if different:  Additional Information:

## 2024-08-27 NOTE — TELEPHONE ENCOUNTER
Talked with patient and let her know surgery is still pending approval with W/C. Let her know should know something by the end of the week and will keep her updated. Patient verbalized understanding and thankful.

## 2024-09-02 ENCOUNTER — ANESTHESIA EVENT (OUTPATIENT)
Dept: SURGERY | Facility: HOSPITAL | Age: 49
End: 2024-09-02
Payer: OTHER MISCELLANEOUS

## 2024-09-02 NOTE — ANESTHESIA PREPROCEDURE EVALUATION
09/02/2024  Cole Herrera is a 48 y.o., female.      Pre-op Assessment    I have reviewed the Patient Summary Reports.    I have reviewed the NPO Status.   I have reviewed the Medications.     Review of Systems         Anesthesia Plan  Type of Anesthesia, risks & benefits discussed:    Anesthesia Type: Gen Supraglottic Airway  Intra-op Monitoring Plan: Standard ASA Monitors  Post Op Pain Control Plan: IV/PO Opioids PRN  Induction:  IV  Informed Consent: Informed consent signed with the Patient and all parties understand the risks and agree with anesthesia plan.  All questions answered.   ASA Score: 3    Ready For Surgery From Anesthesia Perspective.     .  NPO greater than 8 hours  No known anesthetic complications  Allergic to phenergan    Hct 37    HTN  NIDDM  Nonalcoholic steatohepatitis  GERD  Sickle cell trait  Anxiety  Restless legs syndrome  Previous hysterectomy    Adequate ROM at neck

## 2024-09-03 ENCOUNTER — HOSPITAL ENCOUNTER (OUTPATIENT)
Facility: HOSPITAL | Age: 49
Discharge: HOME | End: 2024-09-03
Attending: ORTHOPAEDIC SURGERY | Admitting: ORTHOPAEDIC SURGERY
Payer: OTHER MISCELLANEOUS

## 2024-09-03 ENCOUNTER — ANESTHESIA (OUTPATIENT)
Dept: SURGERY | Facility: HOSPITAL | Age: 49
End: 2024-09-03
Payer: OTHER MISCELLANEOUS

## 2024-09-03 VITALS
OXYGEN SATURATION: 99 % | SYSTOLIC BLOOD PRESSURE: 129 MMHG | BODY MASS INDEX: 31.52 KG/M2 | WEIGHT: 208 LBS | HEART RATE: 56 BPM | DIASTOLIC BLOOD PRESSURE: 58 MMHG | HEIGHT: 68 IN | TEMPERATURE: 98 F | RESPIRATION RATE: 16 BRPM

## 2024-09-03 DIAGNOSIS — S83.241A ACUTE MEDIAL MENISCUS TEAR OF RIGHT KNEE: Primary | ICD-10-CM

## 2024-09-03 LAB
GLUCOSE SERPL-MCNC: 163 MG/DL (ref 70–105)
GLUCOSE SERPL-MCNC: 272 MG/DL (ref 70–105)
GLUCOSE SERPL-MCNC: 288 MG/DL (ref 70–105)

## 2024-09-03 PROCEDURE — 27201423 OPTIME MED/SURG SUP & DEVICES STERILE SUPPLY: Performed by: ORTHOPAEDIC SURGERY

## 2024-09-03 PROCEDURE — 37000008 HC ANESTHESIA 1ST 15 MINUTES: Performed by: ORTHOPAEDIC SURGERY

## 2024-09-03 PROCEDURE — 82962 GLUCOSE BLOOD TEST: CPT

## 2024-09-03 PROCEDURE — 29881 ARTHRS KNE SRG MNISECTMY M/L: CPT | Mod: 51,RT,, | Performed by: ORTHOPAEDIC SURGERY

## 2024-09-03 PROCEDURE — 71000015 HC POSTOP RECOV 1ST HR: Performed by: ORTHOPAEDIC SURGERY

## 2024-09-03 PROCEDURE — 97161 PT EVAL LOW COMPLEX 20 MIN: CPT

## 2024-09-03 PROCEDURE — 25000003 PHARM REV CODE 250: Performed by: NURSE ANESTHETIST, CERTIFIED REGISTERED

## 2024-09-03 PROCEDURE — 71000016 HC POSTOP RECOV ADDL HR: Performed by: ORTHOPAEDIC SURGERY

## 2024-09-03 PROCEDURE — 63600175 PHARM REV CODE 636 W HCPCS: Performed by: ANESTHESIOLOGY

## 2024-09-03 PROCEDURE — 25000003 PHARM REV CODE 250: Performed by: ORTHOPAEDIC SURGERY

## 2024-09-03 PROCEDURE — 36000710: Performed by: ORTHOPAEDIC SURGERY

## 2024-09-03 PROCEDURE — 27000655: Performed by: ANESTHESIOLOGY

## 2024-09-03 PROCEDURE — 29879 ARTHRS KNE SRG ABRASJ ARTHRP: CPT | Mod: RT,,, | Performed by: ORTHOPAEDIC SURGERY

## 2024-09-03 PROCEDURE — 63600175 PHARM REV CODE 636 W HCPCS: Performed by: NURSE ANESTHETIST, CERTIFIED REGISTERED

## 2024-09-03 PROCEDURE — 71000033 HC RECOVERY, INTIAL HOUR: Performed by: ORTHOPAEDIC SURGERY

## 2024-09-03 PROCEDURE — 27000510 HC BLANKET BAIR HUGGER ANY SIZE: Performed by: ANESTHESIOLOGY

## 2024-09-03 PROCEDURE — 27000716 HC OXISENSOR PROBE, ANY SIZE: Performed by: ANESTHESIOLOGY

## 2024-09-03 PROCEDURE — 36000711: Performed by: ORTHOPAEDIC SURGERY

## 2024-09-03 PROCEDURE — 37000009 HC ANESTHESIA EA ADD 15 MINS: Performed by: ORTHOPAEDIC SURGERY

## 2024-09-03 PROCEDURE — 27000177 HC AIRWAY, LARYNGEAL MASK: Performed by: ANESTHESIOLOGY

## 2024-09-03 RX ORDER — SODIUM CHLORIDE 9 MG/ML
INJECTION, SOLUTION INTRAVENOUS CONTINUOUS
Status: DISCONTINUED | OUTPATIENT
Start: 2024-09-03 | End: 2024-09-03 | Stop reason: HOSPADM

## 2024-09-03 RX ORDER — HYDROCODONE BITARTRATE AND ACETAMINOPHEN 10; 325 MG/1; MG/1
1 TABLET ORAL EVERY 4 HOURS PRN
Status: DISCONTINUED | OUTPATIENT
Start: 2024-09-03 | End: 2024-09-03 | Stop reason: HOSPADM

## 2024-09-03 RX ORDER — ACETAMINOPHEN 500 MG
1000 TABLET ORAL EVERY 6 HOURS PRN
Status: DISCONTINUED | OUTPATIENT
Start: 2024-09-03 | End: 2024-09-03 | Stop reason: HOSPADM

## 2024-09-03 RX ORDER — ONDANSETRON HYDROCHLORIDE 2 MG/ML
4 INJECTION, SOLUTION INTRAVENOUS DAILY PRN
Status: DISCONTINUED | OUTPATIENT
Start: 2024-09-03 | End: 2024-09-03 | Stop reason: HOSPADM

## 2024-09-03 RX ORDER — MEPERIDINE HYDROCHLORIDE 25 MG/ML
25 INJECTION INTRAMUSCULAR; INTRAVENOUS; SUBCUTANEOUS EVERY 10 MIN PRN
Status: DISCONTINUED | OUTPATIENT
Start: 2024-09-03 | End: 2024-09-03 | Stop reason: HOSPADM

## 2024-09-03 RX ORDER — MIDAZOLAM HYDROCHLORIDE 1 MG/ML
INJECTION INTRAMUSCULAR; INTRAVENOUS
Status: DISCONTINUED | OUTPATIENT
Start: 2024-09-03 | End: 2024-09-03

## 2024-09-03 RX ORDER — PROPOFOL 10 MG/ML
VIAL (ML) INTRAVENOUS
Status: DISCONTINUED | OUTPATIENT
Start: 2024-09-03 | End: 2024-09-03

## 2024-09-03 RX ORDER — HYDROMORPHONE HYDROCHLORIDE 2 MG/ML
0.5 INJECTION, SOLUTION INTRAMUSCULAR; INTRAVENOUS; SUBCUTANEOUS EVERY 5 MIN PRN
Status: DISCONTINUED | OUTPATIENT
Start: 2024-09-03 | End: 2024-09-03 | Stop reason: HOSPADM

## 2024-09-03 RX ORDER — LIDOCAINE HYDROCHLORIDE 20 MG/ML
INJECTION, SOLUTION EPIDURAL; INFILTRATION; INTRACAUDAL; PERINEURAL
Status: DISCONTINUED | OUTPATIENT
Start: 2024-09-03 | End: 2024-09-03

## 2024-09-03 RX ORDER — GLUCAGON 1 MG
1 KIT INJECTION
Status: DISCONTINUED | OUTPATIENT
Start: 2024-09-03 | End: 2024-09-03 | Stop reason: HOSPADM

## 2024-09-03 RX ORDER — MORPHINE SULFATE 10 MG/ML
4 INJECTION INTRAMUSCULAR; INTRAVENOUS; SUBCUTANEOUS EVERY 5 MIN PRN
Status: DISCONTINUED | OUTPATIENT
Start: 2024-09-03 | End: 2024-09-03 | Stop reason: HOSPADM

## 2024-09-03 RX ORDER — HYDROCODONE BITARTRATE AND ACETAMINOPHEN 5; 325 MG/1; MG/1
1 TABLET ORAL EVERY 4 HOURS PRN
Status: DISCONTINUED | OUTPATIENT
Start: 2024-09-03 | End: 2024-09-03 | Stop reason: HOSPADM

## 2024-09-03 RX ORDER — DIPHENHYDRAMINE HYDROCHLORIDE 50 MG/ML
25 INJECTION INTRAMUSCULAR; INTRAVENOUS EVERY 6 HOURS PRN
Status: DISCONTINUED | OUTPATIENT
Start: 2024-09-03 | End: 2024-09-03 | Stop reason: HOSPADM

## 2024-09-03 RX ORDER — FENTANYL CITRATE 50 UG/ML
INJECTION, SOLUTION INTRAMUSCULAR; INTRAVENOUS
Status: DISCONTINUED | OUTPATIENT
Start: 2024-09-03 | End: 2024-09-03

## 2024-09-03 RX ORDER — ONDANSETRON 4 MG/1
8 TABLET, ORALLY DISINTEGRATING ORAL EVERY 8 HOURS PRN
Status: DISCONTINUED | OUTPATIENT
Start: 2024-09-03 | End: 2024-09-03 | Stop reason: HOSPADM

## 2024-09-03 RX ORDER — HYDROCODONE BITARTRATE AND ACETAMINOPHEN 5; 325 MG/1; MG/1
1 TABLET ORAL EVERY 6 HOURS PRN
Qty: 28 TABLET | Refills: 0 | Status: SHIPPED | OUTPATIENT
Start: 2024-09-03 | End: 2024-09-10

## 2024-09-03 RX ORDER — DEXAMETHASONE SODIUM PHOSPHATE 4 MG/ML
INJECTION, SOLUTION INTRA-ARTICULAR; INTRALESIONAL; INTRAMUSCULAR; INTRAVENOUS; SOFT TISSUE
Status: DISCONTINUED | OUTPATIENT
Start: 2024-09-03 | End: 2024-09-03

## 2024-09-03 RX ADMIN — SODIUM CHLORIDE 2 G: 9 INJECTION, SOLUTION INTRAVENOUS at 08:09

## 2024-09-03 RX ADMIN — FENTANYL CITRATE 50 MCG: 50 INJECTION, SOLUTION INTRAMUSCULAR; INTRAVENOUS at 08:09

## 2024-09-03 RX ADMIN — HYDROCODONE BITARTRATE AND ACETAMINOPHEN 1 TABLET: 10; 325 TABLET ORAL at 10:09

## 2024-09-03 RX ADMIN — HUMAN INSULIN 5 UNITS: 100 INJECTION, SOLUTION SUBCUTANEOUS at 08:09

## 2024-09-03 RX ADMIN — HUMAN INSULIN 5 UNITS: 100 INJECTION, SOLUTION SUBCUTANEOUS at 07:09

## 2024-09-03 RX ADMIN — LIDOCAINE HYDROCHLORIDE 50 MG: 20 INJECTION, SOLUTION INTRAVENOUS at 08:09

## 2024-09-03 RX ADMIN — PROPOFOL 150 MG: 10 INJECTION, EMULSION INTRAVENOUS at 08:09

## 2024-09-03 RX ADMIN — MIDAZOLAM HYDROCHLORIDE 2 MG: 1 INJECTION, SOLUTION INTRAMUSCULAR; INTRAVENOUS at 08:09

## 2024-09-03 RX ADMIN — SODIUM CHLORIDE: 9 INJECTION, SOLUTION INTRAVENOUS at 08:09

## 2024-09-03 RX ADMIN — SODIUM CHLORIDE: 9 INJECTION, SOLUTION INTRAVENOUS at 07:09

## 2024-09-03 RX ADMIN — DEXAMETHASONE SODIUM PHOSPHATE 4 MG: 4 INJECTION, SOLUTION INTRA-ARTICULAR; INTRALESIONAL; INTRAMUSCULAR; INTRAVENOUS; SOFT TISSUE at 08:09

## 2024-09-03 RX ADMIN — SODIUM CHLORIDE: 9 INJECTION, SOLUTION INTRAVENOUS at 09:09

## 2024-09-03 NOTE — SUBJECTIVE & OBJECTIVE
Past Medical History:   Diagnosis Date    Allergy     Depression     Diabetes mellitus, type 2     Epigastric pain 2021    GERD (gastroesophageal reflux disease)     Hypertension     Sickle cell anemia     trait not anemia       Past Surgical History:   Procedure Laterality Date    APPENDECTOMY      CARPAL TUNNEL RELEASE      CARPAL TUNNEL RELEASE       SECTION      CHOLECYSTECTOMY      ESOPHAGOGASTRODUODENOSCOPY  2021    HYSTERECTOMY         Review of patient's allergies indicates:   Allergen Reactions    Phenergan plain Other (See Comments)     Pt states feeling 'funny'       No current facility-administered medications for this encounter.     Current Outpatient Medications   Medication Sig    atorvastatin (LIPITOR) 10 MG tablet Take 1 tablet (10 mg total) by mouth once daily.    citalopram (CELEXA) 20 MG tablet Take 1 tablet by mouth once daily    cyclobenzaprine (FLEXERIL) 10 MG tablet Take 10 mg by mouth 3 (three) times daily.    fluconazole (DIFLUCAN) 150 MG Tab Take 1 tablet (150 mg total) by mouth once a week.    gabapentin (NEURONTIN) 300 MG capsule Take 1 capsule (300 mg total) by mouth once daily.    insulin degludec (TRESIBA FLEXTOUCH U-100) 100 unit/mL (3 mL) InPn Inject 60 Units into the skin nightly.    metFORMIN (GLUCOPHAGE) 850 MG tablet Take 1 tablet (850 mg total) by mouth 2 (two) times daily with meals.    naproxen (NAPROSYN) 500 MG tablet Take 1 tablet (500 mg total) by mouth 2 (two) times daily.    NOVOLOG FLEXPEN U-100 INSULIN 100 unit/mL (3 mL) InPn pen Inject 40 Units into the skin 3 (three) times daily.    pantoprazole (PROTONIX) 40 MG tablet Take 1 tablet (40 mg total) by mouth once daily.    rOPINIRole (REQUIP) 0.25 MG tablet Take 1 tablet (0.25 mg total) by mouth every evening.    tirzepatide (MOUNJARO) 2.5 mg/0.5 mL PnIj Inject 2.5 mg into the skin every 7 days.    triamterene-hydrochlorothiazide 37.5-25 mg (MAXZIDE-25) 37.5-25 mg per  "tablet Take 1 tablet by mouth once daily     Family History       Problem Relation (Age of Onset)    Arthritis Mother    Cancer Father    Diabetes Mother    Glaucoma Mother    Hyperlipidemia Mother    Hypertension Mother          Tobacco Use    Smoking status: Some Days    Smokeless tobacco: Never    Tobacco comments:     Medical marijuana   Substance and Sexual Activity    Alcohol use: Never    Drug use: Yes     Types: Marijuana    Sexual activity: Yes     Partners: Male     Review of Systems   Constitutional: Negative.   Objective:     Vital Signs (Most Recent):    Vital Signs (24h Range):  BP: ()/()   Arterial Line BP: ()/()            There is no height or weight on file to calculate BMI.    No intake or output data in the 24 hours ending 09/02/24 1906     General    Vitals reviewed.  Constitutional: She is oriented to person, place, and time. She appears well-developed and well-nourished.   HENT:   Head: Normocephalic and atraumatic.   Eyes: EOM are normal.   Cardiovascular:  Normal rate, regular rhythm and normal heart sounds.            Pulmonary/Chest: Effort normal and breath sounds normal.   Abdominal: Soft. Bowel sounds are normal.   Neurological: She is alert and oriented to person, place, and time.   Psychiatric: She has a normal mood and affect. Her behavior is normal.           Right Knee Exam     Inspection   Effusion: present    Tenderness   The patient is tender to palpation of the medial joint line.    Range of Motion   The patient has normal right knee ROM.    Tests   Meniscus   Garry:  Medial - positive   Ligament Examination   Lachman: normal (-1 to 2mm)   PCL-Posterior Drawer: normal (0 to 2mm)     MCL - Valgus: normal (0 to 2mm)  LCL - Varus: normal  Pivot Shift: normal (Equal)    Other   Sensation: normal    Left Knee Exam   Left knee exam is normal.    Vascular Exam     Right Pulses  Dorsalis Pedis:      2+  Posterior Tibial:      2+       Significant Labs: {Results:68615::"All " "pertinent labs within the past 24 hours have been reviewed."}    Significant Imaging: {Imaging Review:26826}  "

## 2024-09-03 NOTE — OP NOTE
[facility] - Orthopedic Periop Services  Surgery Department  Operative Note    SUMMARY     Date of Procedure: 9/3/2024     Procedure: Procedure(s) (LRB):  ARTHROSCOPY, KNEE (Right)  MICROFRACTURE MEDIAL FEMORAL CONDYLE (Right)  EXCISION, PLICA, KNEE, ARTHROSCOPIC (Right)  ARTHROSCOPY, KNEE, WITH PARTIAL MEDIAL MENISCECTOMY (Right)     Surgeons and Role:     * Joshua Dahl MD - Primary    Assisting Surgeon: None    Pre-Operative Diagnosis: Acute medial meniscus tear of right knee, subsequent encounter [S83.241D]    Post-Operative Diagnosis: Post-Op Diagnosis Codes:     * Acute medial meniscus tear of right knee, subsequent encounter [S83.241D]    Anesthesia:  General    Technical Procedures Used:  Right knee arthroscopy           DEPARTMENT OF ORTHOPEDIC SURGERY                OPERATIVE REPORT     NAME:  Cole Herrera  MRN: [id number]               DATE OF SURGERY:  09/03/2024     PREOPERATIVE DIAGNOSIS:  right knee internal derangement    POSTOPERATIVE DIAGNOSIS:  M SP enlargement, 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    PROCEDURE:  Examination under anesthesia, arthroscopy with intraarticular probing, M SP excision, chondroplasty/microfracture medial femoral condyle, partial medial meniscectomy    PROCEDURE IN DETAIL:  The patient was taken to the operating room and placed in the supine position.  After adequate level of general anesthesia had been achieved (See Anesthesia Note) patients right knee was examined.  There was 1+ intraarticular effusion.  Knee range of motion was 0-130 degrees of flexion.  Lachman exam was negative as is the FRD.  There is no medial or ligamentous instability appreciated.  Garry test produced intermittent popping felt to emanate from the  lateral aspect of the joint.  Now, the rightlower extremity was scrubbed with Betadine and draped in sterile fashion.  The right lower extremity was elevated, exsanguinated with a Js bandage.  A pneumatic tourniquet about the upper thigh, to pressure of 300 millimeters of Mercury.  The operation was begun by making a small stab wound about the superolateral aspect of the right patella.  A trocar was introduced into the suprapatellar pouch and the knee was distended with sterile saline.  Second and third stab wounds were made about the medial and lateral aspects of the patellar tendon for introduction of the arthroscopy camera and intraarticular probe.  Now a systematic evaluation of the knee was carried out.  Inspection of the suprapatellar pouch was unremarkable.  Exam of the patellofemoral joint showed grade 1-2/4 DJD involving the patellar articular surface.  This was lightly debrided with a shaver.  There was enlargement of the medial synovial plica.  This was excised with a shaver and contoured with the radiofrequency Wand.  Exam of the medial joint revealed a grade 3-4/4 chondral defect involving weight-bearing articular surface of the medial femoral condyle.  This was debrided and abraded with the shaver.  Microfracture was performed with a chondral pick.  Inspection of the meniscus showed some tearing involving the mesial root.  This debrided with a shaver and contoured with the radiofrequency Wand.  Examiner condylar notch revealed an intact anterior and posterior cruciate ligament.  Exam lateral joint showed an area of grade 2 to 3/4 DJD involving the weight-bearing articular surface of the lateral femoral condyle.  Now, the tourniquet was let down.  Hemostasis was achieved with radiofrequency Wand.  Knee joint was irrigated copiously with antibiotic solution and arthroscope withdrawn.  The stab wounds were reapproximated with interrupted 4-0 suture.  The wounds were dressed sterilely.  The patient was  taken to the recovery room in satisfactory condition.  ESTIMATED BLOOD LOSS:  5ccs.   Tourniquet time: 16 minutes.  The patient received Ancef antibiotic intravenously prior to the procedure.      Joshua Dahl     Description of the Findings of the Procedure:  M SP enlargement, grade 1-2/4 DJD patellofemoral articular surface, grade 3-4/4 chondral defect medial femoral condyle articular surface, tear mesial root posterior horn medial meniscus, grade 3/4 chondral defect lateral femoral condyle  Significant Surgical Tasks Conducted by the Assistant(s), if Applicable:     Complications: No    Estimated Blood Loss (EBL): 5 mL           Implants: * No implants in log *    Specimens:   Specimen (24h ago, onward)      None                    Condition: Good    Disposition: PACU - hemodynamically stable.    Attestation: I was present and scrubbed for the entire procedure.

## 2024-09-03 NOTE — PT/OT/SLP EVAL
Physical Therapy Evaluation    Patient Name:  Cole Herrera   MRN:  [id number]    Recommendations:     Discharge Recommendations: No Therapy Indicated   Discharge Equipment Recommendations: none   Barriers to discharge: None    Assessment:     oCle Herrera is a 48 y.o. female admitted with a medical diagnosis of Acute medial meniscus tear of right knee.  She presents with the following impairments/functional limitations: orthopedic precautions, gait instability Patient with good understanding of post op education.    Rehab Prognosis: Good; patient would benefit from acute skilled PT services to address these deficits and reach maximum level of function.    Recent Surgery: Procedure(s) (LRB):  ARTHROSCOPY, KNEE (Right)  MICROFRACTURE MEDIAL FEMORAL CONDYLE (Right)  EXCISION, PLICA, KNEE, ARTHROSCOPIC (Right)  ARTHROSCOPY, KNEE, WITH PARTIAL MEDIAL MENISCECTOMY (Right) Day of Surgery    Plan:     During this hospitalization, patient to be seen 1 x/week to address the identified rehab impairments via gait training, therapeutic activities, therapeutic exercises and progress toward the following goals:    Plan of Care Expires:  09/03/24    Subjective     Chief Complaint: post op pain  Patient/Family Comments/goals: plan is dc home today  Pain/Comfort:  Pain Rating 1: 4/10  Location - Side 1: Right  Location 1: knee  Pain Addressed 1: Cessation of Activity, Pre-medicate for activity    Patients cultural, spiritual, Buddhist conflicts given the current situation: no    Living Environment:  Lives with family  Prior to admission, patients level of function was independent.  Equipment used at home: crutches.  DME owned (not currently used): none.  Upon discharge, patient will have assistance from family.    Objective:     Communicated with nurse prior to session.  Patient found supine with    upon PT entry to room.    General Precautions: Standard,    Orthopedic Precautions:RLE partial weight bearing   Braces:     Respiratory Status: Room air    Exams:  na    Functional Mobility:  Bed Mobility:     Supine to Sit: contact guard assistance  Sit to Supine: contact guard assistance  Transfers:     Sit to Stand:  contact guard assistance with no AD  Gait: ambulated 20 feet with crutches      AM-PAC 6 CLICK MOBILITY  Total Score:24       Treatment & Education:  Hep ascope protocol  Pwb with crutches    Patient left supine with all lines intact.    GOALS:   Multidisciplinary Problems       Physical Therapy Goals       Not on file                    History:     Past Medical History:   Diagnosis Date    Allergy     Depression     Diabetes mellitus, type 2     Epigastric pain 2021    GERD (gastroesophageal reflux disease)     Hypertension     Sickle cell anemia     trait not anemia       Past Surgical History:   Procedure Laterality Date    APPENDECTOMY      CARPAL TUNNEL RELEASE      CARPAL TUNNEL RELEASE       SECTION      CHOLECYSTECTOMY      ESOPHAGOGASTRODUODENOSCOPY  2021    HYSTERECTOMY         Time Tracking:     PT Received On: 24  PT Start Time: 1200     PT Stop Time: 1225  PT Total Time (min): 25 min     Billable Minutes: Evaluation 2024

## 2024-09-03 NOTE — TRANSFER OF CARE
"Anesthesia Transfer of Care Note    Patient: Cole Herrera    Procedure(s) Performed: Procedure(s) (LRB):  ARTHROSCOPY, KNEE (Right)  MICROFRACTURE (Right)  EXCISION, PLICA, KNEE, ARTHROSCOPIC (Right)    Patient location: PACU    Anesthesia Type: general    Transport from OR: Transported from OR on 6-10 L/min O2 by face mask with adequate spontaneous ventilation    Post pain: adequate analgesia    Post assessment: no apparent anesthetic complications    Post vital signs: stable    Level of consciousness: responds to stimulation, awake and sedated    Nausea/Vomiting: no nausea/vomiting    Complications: none    Transfer of care protocol was followed    Last vitals: Visit Vitals  BP (!) 164/78 (BP Location: Right arm, Patient Position: Lying)   Pulse 75   Temp 36.6 °C (97.9 °F)   Resp 16   Ht 5' 8" (1.727 m)   Wt 94.3 kg (208 lb)   SpO2 100%   Breastfeeding No   BMI 31.63 kg/m²     "

## 2024-09-03 NOTE — ANESTHESIA PROCEDURE NOTES
Intubation    Date/Time: 9/3/2024 8:43 AM    Performed by: Josh Quezada CRNA  Authorized by: Sj Foster MD    Intubation:     Induction:  Intravenous    Intubated:  Postinduction    Mask Ventilation:  Easy mask    Attempts:  1    Attempted By:  CRNA    Method of Intubation:  Direct    Difficult Airway Encountered?: No      Complications:  None    Airway Device:  Supraglottic airway/LMA    Airway Device Size:  4.0    Style/Cuff Inflation:  Cuffed (inflated to minimal occlusive pressure)    Placement Verified By:  Capnometry    Complicating Factors:  None    Findings Post-Intubation:  BS equal bilateral and atraumatic/condition of teeth unchanged

## 2024-09-03 NOTE — HPI
Chief complaint:  Right knee pain/injury  History:Cole Herrera is a 48 y.o. female seen today for recheck of her right knee.  She was been followed for medial knee pain felt related to a work compensation injury.  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  On that day of injury she was seen in the emergency room where  X-rays 05/19/2024 showed minimal medial joint space narrowing and tiny osteophytes beginning from the medial aspect of the distal femur and upper tibial regions.  An MRI examination of the right knee was obtained he was 06/12/2024.  The findings he was suggested tear of the mesial root of the posterior horn of the medial meniscus.  Her insurance carrier required physical therapy.  She was now completed it but remains symptomatic.  We have discussed the option for arthroscopic surgical intervention.  Procedure was discussed in details.  The potential benefits and risks of surgery outlined to include but not limited to bleeding, infection, damage to blood vessels and nerves, need for further surgery, other risks and complications including even death.  Limitations guarding potential DJD were emphasized.  Impression:  Internal derangement-right knee   Plan: Right knee arthroscopy.  General anesthesia as an outpatient discussed.  Potential benefits and risks of surgery outlined to include but not limited to bleeding infection, damage to blood vessels and nerves, need for further surgery, other risks complications including even death..  Limitations of arthroscopy relative to DJD were emphasized. the patient wished to proceed.

## 2024-09-03 NOTE — DISCHARGE SUMMARY
"[facility] - Orthopedic Periop Services  Discharge Note  Short Stay    Procedure(s) (LRB):  ARTHROSCOPY, KNEE (Right)  MICROFRACTURE MEDIAL FEMORAL CONDYLE (Right)  EXCISION, PLICA, KNEE, ARTHROSCOPIC (Right)  ARTHROSCOPY, KNEE, WITH PARTIAL MEDIAL MENISCECTOMY (Right)      OUTCOME: Patient tolerated treatment/procedure well without complication and is now ready for discharge.    DISPOSITION: Home or Self Care    FINAL DIAGNOSIS:  Acute medial meniscus tear of right knee    FOLLOWUP: In clinic    DISCHARGE INSTRUCTIONS:    Discharge Procedure Orders   CRUTCHES FOR HOME USE     Order Specific Question Answer Comments   Type: Axillary    Height: 5' 8" (1.727 m)    Weight: 94.3 kg (208 lb)    Length of need (1-99 months): 2      Diet general     Keep surgical extremity elevated     Ice to affected area   Order Comments: using barrier between ice and skin (specify duration&frequency)     Change dressing (specify)   Order Comments: Dressing change: one time per day beginning 72 hours post op.     Call MD for:  temperature >100.4     Call MD for:  persistent nausea and vomiting     Call MD for:  severe uncontrolled pain     Call MD for:  difficulty breathing, headache or visual disturbances     Call MD for:  redness, tenderness, or signs of infection (pain, swelling, redness, odor or green/yellow discharge around incision site)     Call MD for:  hives     Call MD for:  persistent dizziness or light-headedness     Call MD for:  extreme fatigue     Remove dressing in 48 hours     Activity as tolerated     Shower on day dressing removed (No bath)     Weight bearing as tolerated        TIME SPENT ON DISCHARGE: 15 minutes  "

## 2024-09-03 NOTE — BRIEF OP NOTE
"[facility] - Orthopedic Periop Services  Brief Operative Note    Surgery Date: 9/3/2024     Surgeons and Role:     * Joshua Dahl MD - Primary    Assisting Surgeon: None    Pre-op Diagnosis:  Acute medial meniscus tear of right knee, subsequent encounter [S83.241D]    Post-op Diagnosis:  Post-Op Diagnosis Codes:     * Acute medial meniscus tear of right knee, subsequent encounter [S83.241D]    Procedure(s) (LRB):  ARTHROSCOPY, KNEE (Right)  MICROFRACTURE MEDIAL FEMORAL CONDYLE (Right)  EXCISION, PLICA, KNEE, ARTHROSCOPIC (Right)    Anesthesia:  General    Description of the findings of the procedure(s): See Op Note     Estimated Blood Loss: 5 mL         Specimens:   Specimen (24h ago, onward)      None              Discharge Note    OUTCOME: Patient tolerated treatment/procedure well without complication and is now ready for discharge.    DISPOSITION: Home or Self Care    FINAL DIAGNOSIS:  Acute medial meniscus tear of right knee    FOLLOWUP: In clinic    DISCHARGE INSTRUCTIONS:    Discharge Procedure Orders   CRUTCHES FOR HOME USE     Order Specific Question Answer Comments   Type: Axillary    Height: 5' 8" (1.727 m)    Weight: 94.3 kg (208 lb)    Length of need (1-99 months): 2      Diet general     Keep surgical extremity elevated     Ice to affected area   Order Comments: using barrier between ice and skin (specify duration&frequency)     Change dressing (specify)   Order Comments: Dressing change: one time per day beginning 72 hours post op.     Call MD for:  temperature >100.4     Call MD for:  persistent nausea and vomiting     Call MD for:  severe uncontrolled pain     Call MD for:  difficulty breathing, headache or visual disturbances     Call MD for:  redness, tenderness, or signs of infection (pain, swelling, redness, odor or green/yellow discharge around incision site)     Call MD for:  hives     Call MD for:  persistent dizziness or light-headedness     Call MD for:  extreme fatigue "     Remove dressing in 48 hours     Activity as tolerated     Shower on day dressing removed (No bath)     Weight bearing as tolerated

## 2024-09-04 ENCOUNTER — PATIENT MESSAGE (OUTPATIENT)
Dept: ORTHOPEDICS | Facility: CLINIC | Age: 49
End: 2024-09-04
Payer: COMMERCIAL

## 2024-09-05 NOTE — ANESTHESIA POSTPROCEDURE EVALUATION
Anesthesia Post Evaluation    Patient: Cole Herrera    Procedure(s) Performed: Procedure(s) (LRB):  ARTHROSCOPY, KNEE (Right)  MICROFRACTURE MEDIAL FEMORAL CONDYLE (Right)  EXCISION, PLICA, KNEE, ARTHROSCOPIC (Right)  ARTHROSCOPY, KNEE, WITH PARTIAL MEDIAL MENISCECTOMY (Right)    Final Anesthesia Type: general      Patient location during evaluation: PACU  Post-procedure vital signs: reviewed and stable  Pain management: adequate  Airway patency: patent    PONV status at discharge: No PONV  Anesthetic complications: no      Cardiovascular status: hemodynamically stable  Respiratory status: unassisted  Hydration status: euvolemic  Follow-up not needed.              Vitals Value Taken Time   /58 09/03/24 1201   Temp 36.6 °C (97.9 °F) 09/03/24 0943   Pulse 60 09/03/24 1213   Resp 16 09/03/24 1200   SpO2 98 % 09/03/24 1213   Vitals shown include unfiled device data.      Event Time   Out of Recovery 10:13:11         Pain/Juan Alberto Score: No data recorded

## 2024-09-10 ENCOUNTER — OFFICE VISIT (OUTPATIENT)
Dept: ORTHOPEDICS | Facility: CLINIC | Age: 49
End: 2024-09-10
Payer: OTHER MISCELLANEOUS

## 2024-09-10 VITALS — WEIGHT: 206 LBS | HEIGHT: 68 IN | BODY MASS INDEX: 31.22 KG/M2

## 2024-09-10 DIAGNOSIS — Z98.890 STATUS POST ARTHROSCOPY OF RIGHT KNEE: Primary | ICD-10-CM

## 2024-09-10 PROCEDURE — 99213 OFFICE O/P EST LOW 20 MIN: CPT | Mod: PBBFAC

## 2024-09-10 PROCEDURE — 99999 PR PBB SHADOW E&M-EST. PATIENT-LVL III: CPT | Mod: PBBFAC,,,

## 2024-09-10 PROCEDURE — 99024 POSTOP FOLLOW-UP VISIT: CPT | Mod: ,,,

## 2024-09-10 NOTE — PROGRESS NOTES
Arthroscopy, Knee - Right, Microfracture Medial Femoral Condyle - Right, Excision, Plica, Knee, Arthroscopic - Right, and Arthroscopy, Knee, With Partial Medial Meniscectomy - Right 9/3/2024    Cole Herrera is a 48 y.o. female seen today for post-op visit.  Patient is 1 week status post right knee arthroscopy by Dr. Dahl.  Patient reports continued knee pain.  She has weaned off her crutches.  She is weight-bearing as tolerated.  No other complaints today.      PAST MEDICAL HISTORY:   Past Medical History:   Diagnosis Date    Allergy     Depression     Diabetes mellitus, type 2     Epigastric pain 2021    GERD (gastroesophageal reflux disease)     Hypertension     Sickle cell anemia     trait not anemia        PAST SURGICAL HISTORY:   Past Surgical History:   Procedure Laterality Date    APPENDECTOMY      ARTHROSCOPY OF KNEE Right 9/3/2024    Procedure: ARTHROSCOPY, KNEE;  Surgeon: Joshua Dahl MD;  Location: [facility];  Service: Orthopedics;  Laterality: Right;    ARTHROSCOPY, KNEE, WITH MEDIAL OR LATERAL MENISCECTOMY Right 9/3/2024    Procedure: ARTHROSCOPY, KNEE, WITH PARTIAL MEDIAL MENISCECTOMY;  Surgeon: Joshua Dahl MD;  Location: [facility] OR;  Service: Orthopedics;  Laterality: Right;    CARPAL TUNNEL RELEASE      CARPAL TUNNEL RELEASE       SECTION      CHOLECYSTECTOMY      ESOPHAGOGASTRODUODENOSCOPY  2021    HYSTERECTOMY      KNEE ARTHROSCOPY W/ PLICA EXCISION Right 9/3/2024    Procedure: EXCISION, PLICA, KNEE, ARTHROSCOPIC;  Surgeon: Joshua Dahl MD;  Location: [facility] OR;  Service: Orthopedics;  Laterality: Right;    MICROFRACTURE Right 9/3/2024    Procedure: MICROFRACTURE MEDIAL FEMORAL CONDYLE;  Surgeon: Joshua Dahl MD;  Location: [facility] OR;  Service: Orthopedics;  Laterality: Right;        MEDICATIONS:   Current Outpatient Medications:     atorvastatin (LIPITOR) 10 MG tablet, Take 1 tablet (10  mg total) by mouth once daily., Disp: 90 tablet, Rfl: 3    citalopram (CELEXA) 20 MG tablet, Take 1 tablet by mouth once daily, Disp: 90 tablet, Rfl: 3    gabapentin (NEURONTIN) 300 MG capsule, Take 1 capsule (300 mg total) by mouth once daily., Disp: 90 capsule, Rfl: 3    HYDROcodone-acetaminophen (NORCO) 5-325 mg per tablet, Take 1 tablet by mouth every 6 (six) hours as needed for Pain., Disp: 28 tablet, Rfl: 0    insulin degludec (TRESIBA FLEXTOUCH U-100) 100 unit/mL (3 mL) InPn, Inject 60 Units into the skin nightly., Disp: , Rfl:     metFORMIN (GLUCOPHAGE) 850 MG tablet, Take 1 tablet (850 mg total) by mouth 2 (two) times daily with meals., Disp: 180 tablet, Rfl: 1    naproxen (NAPROSYN) 500 MG tablet, Take 1 tablet (500 mg total) by mouth 2 (two) times daily., Disp: 30 tablet, Rfl: 0    NOVOLOG FLEXPEN U-100 INSULIN 100 unit/mL (3 mL) InPn pen, Inject 40 Units into the skin 3 (three) times daily., Disp: , Rfl:     pantoprazole (PROTONIX) 40 MG tablet, Take 1 tablet (40 mg total) by mouth once daily., Disp: 90 tablet, Rfl: 3    tirzepatide (MOUNJARO) 2.5 mg/0.5 mL PnIj, Inject 2.5 mg into the skin every 7 days., Disp: 12 Pen, Rfl: 3    triamterene-hydrochlorothiazide 37.5-25 mg (MAXZIDE-25) 37.5-25 mg per tablet, Take 1 tablet by mouth once daily, Disp: 90 tablet, Rfl: 3    cyclobenzaprine (FLEXERIL) 10 MG tablet, Take 10 mg by mouth 3 (three) times daily. (Patient not taking: Reported on 9/10/2024), Disp: , Rfl:     fluconazole (DIFLUCAN) 150 MG Tab, Take 1 tablet (150 mg total) by mouth once a week. (Patient not taking: Reported on 9/10/2024), Disp: 2 tablet, Rfl: 0    rOPINIRole (REQUIP) 0.25 MG tablet, Take 1 tablet (0.25 mg total) by mouth every evening., Disp: 30 tablet, Rfl: 3       PHYSICAL EXAM:      GENERAL: Well-developed, well-nourished female . The patient is alert, oriented and cooperative.    EXTREMITIES:  Right knee was skin clean dry and intact, appropriately tender to palpation, minimal  swelling and erythema around incision sites, range of motion of knee lacking 5° from full extension, flexion up past 90°, neurovascularly intact    WOUND: Incisions are clean,dry,intact without signs of infection and healing well      RADIOGRAPHIC FINDINGS:   No results found.     Patient Active Problem List    Diagnosis Date Noted    Acute medial meniscus tear of right knee 06/17/2024    Acute pain of right shoulder 01/19/2023    Sickle cell trait 01/19/2023    Fatigue 01/19/2023    Acute superficial gastritis without hemorrhage     Constipation 07/06/2021    ESTES (nonalcoholic steatohepatitis) 06/10/2021    Restless legs 06/10/2021    Elevated liver enzymes 05/17/2021    Fatty liver 05/17/2021    Epigastric pain 05/11/2021    Esophageal dysphagia 05/11/2021    Type 2 diabetes mellitus without complication, without long-term current use of insulin 03/26/2021    Essential hypertension 03/26/2021    Gastroesophageal reflux disease 03/26/2021    Hyperlipidemia 03/26/2021    Obesity 03/26/2021    Anxiety 03/26/2021    Depression 03/26/2021     IMPRESSION AND PLAN: Patient is status-post Arthroscopy, Knee - Right, Microfracture Medial Femoral Condyle - Right, Excision, Plica, Knee, Arthroscopic - Right, and Arthroscopy, Knee, With Partial Medial Meniscectomy - Right improving.  All incisional sutures removed today and replaced with Steri-Strips, discussed that these can get wet in the shower in 2-3 days, let them fall off on their own.  Recommending range-of-motion exercises to increase knee flexion, discussed that she may need physical therapy in the future if she is unable to increase range of motion at home.  Patient voiced understanding.  Recommend follow up with Dr. Dahl in 3 weeks, sooner as needed.      No follow-ups on file.       Trista Avilez PA-C      (Subject to voice recognition error, transcription service not allowed)

## 2024-10-02 ENCOUNTER — OFFICE VISIT (OUTPATIENT)
Dept: ORTHOPEDICS | Facility: CLINIC | Age: 49
End: 2024-10-02
Payer: COMMERCIAL

## 2024-10-02 DIAGNOSIS — M17.11 PRIMARY OSTEOARTHRITIS OF RIGHT KNEE: Primary | ICD-10-CM

## 2024-10-02 PROCEDURE — 3061F NEG MICROALBUMINURIA REV: CPT | Mod: S$GLB,,, | Performed by: ORTHOPAEDIC SURGERY

## 2024-10-02 PROCEDURE — 3046F HEMOGLOBIN A1C LEVEL >9.0%: CPT | Mod: S$GLB,,, | Performed by: ORTHOPAEDIC SURGERY

## 2024-10-02 PROCEDURE — 1160F RVW MEDS BY RX/DR IN RCRD: CPT | Mod: S$GLB,,, | Performed by: ORTHOPAEDIC SURGERY

## 2024-10-02 PROCEDURE — 1159F MED LIST DOCD IN RCRD: CPT | Mod: S$GLB,,, | Performed by: ORTHOPAEDIC SURGERY

## 2024-10-02 PROCEDURE — 99999 PR PBB SHADOW E&M-EST. PATIENT-LVL III: CPT | Mod: PBBFAC,,, | Performed by: ORTHOPAEDIC SURGERY

## 2024-10-02 PROCEDURE — 3066F NEPHROPATHY DOC TX: CPT | Mod: S$GLB,,, | Performed by: ORTHOPAEDIC SURGERY

## 2024-10-02 PROCEDURE — 99024 POSTOP FOLLOW-UP VISIT: CPT | Mod: S$GLB,,, | Performed by: ORTHOPAEDIC SURGERY

## 2024-10-02 RX ORDER — MELOXICAM 15 MG/1
15 TABLET ORAL DAILY PRN
Qty: 30 TABLET | Refills: 2 | Status: SHIPPED | OUTPATIENT
Start: 2024-10-02

## 2024-10-02 NOTE — PROGRESS NOTES
CLINIC NOTE       Chief Complaint   Patient presents with    Right Knee - Follow-up        Cole Herrera is a 48 y.o. female seen today for recheck of her right knee.  She underwent right knee arthroscopy proximally 4 weeks ago.  She was found to have moderately severe DJD most pronounced involving the medial femoral condyle articular surface.  She was treated with partial medial meniscectomy and microfracture of the medial femoral condyle.  General doing well at this time.  She was minimal discomfort.  She was ambulating independently.  We discussed the use of oral anti-inflammatory medications.  Prescription will be sent for Mobic 15 mg 1 p.o. q.d. p.r.n..  He was planning to returned to work duties on 10/14 2024.  I have suggested intra-articular steroid injection at this time to help with the residual symptoms.      Past Medical History:   Diagnosis Date    Allergy     Depression     Diabetes mellitus, type 2     Epigastric pain 05/11/2021    GERD (gastroesophageal reflux disease)     Hypertension     Sickle cell anemia     trait not anemia     Family History   Problem Relation Name Age of Onset    Hypertension Mother Claudette Mccarthy     Diabetes Mother Claudette Laptemi     Hyperlipidemia Mother Claudette Laphand     Glaucoma Mother [place] Laptemi     Arthritis Mother Claudette Mccarthy     Cancer Father Jj Davis      Current Outpatient Medications on File Prior to Visit   Medication Sig Dispense Refill    atorvastatin (LIPITOR) 10 MG tablet Take 1 tablet (10 mg total) by mouth once daily. 90 tablet 3    citalopram (CELEXA) 20 MG tablet Take 1 tablet by mouth once daily 90 tablet 3    cyclobenzaprine (FLEXERIL) 10 MG tablet Take 10 mg by mouth 3 (three) times daily. (Patient not taking: Reported on 9/10/2024)      fluconazole (DIFLUCAN) 150 MG Tab Take 1 tablet (150 mg total) by mouth once a week. (Patient not taking: Reported on 9/10/2024) 2 tablet 0    gabapentin (NEURONTIN) 300 MG capsule Take 1 capsule (300  mg total) by mouth once daily. 90 capsule 3    insulin degludec (TRESIBA FLEXTOUCH U-100) 100 unit/mL (3 mL) InPn Inject 60 Units into the skin nightly.      metFORMIN (GLUCOPHAGE) 850 MG tablet Take 1 tablet (850 mg total) by mouth 2 (two) times daily with meals. 180 tablet 1    naproxen (NAPROSYN) 500 MG tablet Take 1 tablet (500 mg total) by mouth 2 (two) times daily. 30 tablet 0    NOVOLOG FLEXPEN U-100 INSULIN 100 unit/mL (3 mL) InPn pen Inject 40 Units into the skin 3 (three) times daily.      pantoprazole (PROTONIX) 40 MG tablet Take 1 tablet (40 mg total) by mouth once daily. 90 tablet 3    rOPINIRole (REQUIP) 0.25 MG tablet Take 1 tablet (0.25 mg total) by mouth every evening. 30 tablet 3    tirzepatide (MOUNJARO) 2.5 mg/0.5 mL PnIj Inject 2.5 mg into the skin every 7 days. 12 Pen 3    triamterene-hydrochlorothiazide 37.5-25 mg (MAXZIDE-25) 37.5-25 mg per tablet Take 1 tablet by mouth once daily 90 tablet 3     No current facility-administered medications on file prior to visit.       ROS     There were no vitals filed for this visit.    Past Surgical History:   Procedure Laterality Date    APPENDECTOMY      ARTHROSCOPY OF KNEE Right 9/3/2024    Procedure: ARTHROSCOPY, KNEE;  Surgeon: Joshua Dahl MD;  Location: [facility];  Service: Orthopedics;  Laterality: Right;    ARTHROSCOPY, KNEE, WITH MEDIAL OR LATERAL MENISCECTOMY Right 9/3/2024    Procedure: ARTHROSCOPY, KNEE, WITH PARTIAL MEDIAL MENISCECTOMY;  Surgeon: Joshua Dahl MD;  Location: [facility];  Service: Orthopedics;  Laterality: Right;    CARPAL TUNNEL RELEASE      CARPAL TUNNEL RELEASE       SECTION      CHOLECYSTECTOMY      ESOPHAGOGASTRODUODENOSCOPY  2021    HYSTERECTOMY      KNEE ARTHROSCOPY W/ PLICA EXCISION Right 9/3/2024    Procedure: EXCISION, PLICA, KNEE, ARTHROSCOPIC;  Surgeon: Joshua Dahl MD;  Location: [facility];  Service: Orthopedics;  Laterality: Right;    MICROFRACTURE  Right 9/3/2024    Procedure: MICROFRACTURE MEDIAL FEMORAL CONDYLE;  Surgeon: Joshua Dahl MD;  Location: [facility];  Service: Orthopedics;  Laterality: Right;        Review of patient's allergies indicates:   Allergen Reactions    Phenergan plain Other (See Comments)     Pt states feeling 'funny'        Ortho Exam : Right knee arthroscopy portal incisions well healed without signs of infection.  He was mild 1+ effusion.  Knee motion is 0-130 degrees of flexion.    Radiographic Examination:    Technique:    Findings:    Impression:   See Above    Assessment and Plan  Patient Active Problem List    Diagnosis Date Noted    Acute medial meniscus tear of right knee 06/17/2024    Acute pain of right shoulder 01/19/2023    Sickle cell trait 01/19/2023    Fatigue 01/19/2023    Acute superficial gastritis without hemorrhage     Constipation 07/06/2021    ESTES (nonalcoholic steatohepatitis) 06/10/2021    Restless legs 06/10/2021    Elevated liver enzymes 05/17/2021    Fatty liver 05/17/2021    Epigastric pain 05/11/2021    Esophageal dysphagia 05/11/2021    Type 2 diabetes mellitus without complication, without long-term current use of insulin 03/26/2021    Essential hypertension 03/26/2021    Gastroesophageal reflux disease 03/26/2021    Hyperlipidemia 03/26/2021    Obesity 03/26/2021    Anxiety 03/26/2021    Depression 03/26/2021    Impression:  He was status post right knee arthroscopy with underlying DJD he was right knee  Plan:  Right knee was prepped with Betadine intra-articular steroid injection performed with triamcinolone and Marcaine 1 cc each.  Rx Mobic 15 mg 1 p.o. q.d. p.r.n..  Allow returned to work duties 10/14/2024.  Literature given regarding potential viscosupplementation injection use.    Joshua Dahl M.D.

## 2024-10-02 NOTE — LETTER
October 2, 2024      [facility]  [address] [place] [id number]  Phone: [phone]  Fax: [phone]       Patient: Cole Herrera   YOB: 1975  Date of Visit: 10/02/2024    To Whom It May Concern:    Wilfredo Herrera  was at [facility] on 10/02/2024. The patient may return to work/school on 10/14/2024 with no restrictions. If you have any questions or concerns, or if I can be of further assistance, please do not hesitate to contact me.    Sincerely,  Dr. Joshua García RN

## 2024-11-19 DIAGNOSIS — M65.312 TRIGGER FINGER OF LEFT THUMB: Primary | ICD-10-CM

## 2024-11-20 ENCOUNTER — PATIENT MESSAGE (OUTPATIENT)
Dept: ORTHOPEDICS | Facility: CLINIC | Age: 49
End: 2024-11-20
Payer: COMMERCIAL

## 2024-11-20 ENCOUNTER — OFFICE VISIT (OUTPATIENT)
Dept: ORTHOPEDICS | Facility: CLINIC | Age: 49
End: 2024-11-20
Payer: COMMERCIAL

## 2024-11-20 ENCOUNTER — HOSPITAL ENCOUNTER (OUTPATIENT)
Dept: RADIOLOGY | Facility: HOSPITAL | Age: 49
Discharge: HOME | End: 2024-11-20
Attending: ORTHOPAEDIC SURGERY
Payer: COMMERCIAL

## 2024-11-20 DIAGNOSIS — M65.312 TRIGGER FINGER OF LEFT THUMB: ICD-10-CM

## 2024-11-20 DIAGNOSIS — Z01.812 PRE-OPERATIVE LABORATORY EXAMINATION: ICD-10-CM

## 2024-11-20 DIAGNOSIS — M65.312 TRIGGER FINGER OF LEFT THUMB: Primary | ICD-10-CM

## 2024-11-20 PROCEDURE — 73130 X-RAY EXAM OF HAND: CPT | Mod: TC,LT

## 2024-11-20 PROCEDURE — 99999 PR PBB SHADOW E&M-EST. PATIENT-LVL III: CPT | Mod: PBBFAC,,, | Performed by: ORTHOPAEDIC SURGERY

## 2024-11-20 PROCEDURE — 73130 X-RAY EXAM OF HAND: CPT | Mod: 26,LT,, | Performed by: ORTHOPAEDIC SURGERY

## 2024-11-20 NOTE — PROGRESS NOTES
CLINIC NOTE       Chief Complaint   Patient presents with    Left Hand - Pain        Cole Herrera is a 48 y.o. female seen today for evaluation of her left thumb pain and dysfunction.  Initially developed triggering insidiously with no history of trauma.  Over the past 3 weeks she was had locking requiring manual reduction.  She was right-hand dominant.  She was not on any blood thinners.      Past Medical History:   Diagnosis Date    Allergy     Depression     Diabetes mellitus, type 2     Epigastric pain 05/11/2021    GERD (gastroesophageal reflux disease)     Hypertension     Sickle cell anemia     trait not anemia     Family History   Problem Relation Name Age of Onset    Hypertension Mother [place] Laphand     Diabetes Mother [place] Laphand     Hyperlipidemia Mother Claudette Laphand     Glaucoma Mother Claudette Laphand     Arthritis Mother Claudette Laphand     Cancer Father Jj Davis      Current Outpatient Medications on File Prior to Visit   Medication Sig Dispense Refill    atorvastatin (LIPITOR) 10 MG tablet Take 1 tablet (10 mg total) by mouth once daily. 90 tablet 3    citalopram (CELEXA) 20 MG tablet Take 1 tablet by mouth once daily 90 tablet 3    cyclobenzaprine (FLEXERIL) 10 MG tablet Take 10 mg by mouth 3 (three) times daily. (Patient not taking: Reported on 9/10/2024)      fluconazole (DIFLUCAN) 150 MG Tab Take 1 tablet (150 mg total) by mouth once a week. (Patient not taking: Reported on 9/10/2024) 2 tablet 0    gabapentin (NEURONTIN) 300 MG capsule Take 1 capsule (300 mg total) by mouth once daily. 90 capsule 3    insulin degludec (TRESIBA FLEXTOUCH U-100) 100 unit/mL (3 mL) InPn Inject 60 Units into the skin nightly.      meloxicam (MOBIC) 15 MG tablet Take 1 tablet (15 mg total) by mouth daily as needed for Pain. 30 tablet 2    metFORMIN (GLUCOPHAGE) 850 MG tablet Take 1 tablet (850 mg total) by mouth 2 (two) times daily with meals. 180 tablet 1    naproxen (NAPROSYN) 500 MG tablet Take 1  tablet (500 mg total) by mouth 2 (two) times daily. 30 tablet 0    NOVOLOG FLEXPEN U-100 INSULIN 100 unit/mL (3 mL) InPn pen Inject 40 Units into the skin 3 (three) times daily.      pantoprazole (PROTONIX) 40 MG tablet Take 1 tablet (40 mg total) by mouth once daily. 90 tablet 3    rOPINIRole (REQUIP) 0.25 MG tablet Take 1 tablet (0.25 mg total) by mouth every evening. 30 tablet 3    tirzepatide (MOUNJARO) 2.5 mg/0.5 mL PnIj Inject 2.5 mg into the skin every 7 days. 12 Pen 3    triamterene-hydrochlorothiazide 37.5-25 mg (MAXZIDE-25) 37.5-25 mg per tablet Take 1 tablet by mouth once daily 90 tablet 3     No current facility-administered medications on file prior to visit.       ROS     There were no vitals filed for this visit.    Past Surgical History:   Procedure Laterality Date    APPENDECTOMY      ARTHROSCOPY OF KNEE Right 9/3/2024    Procedure: ARTHROSCOPY, KNEE;  Surgeon: Joshua Dahl MD;  Location: [facility];  Service: Orthopedics;  Laterality: Right;    ARTHROSCOPY, KNEE, WITH MEDIAL OR LATERAL MENISCECTOMY Right 9/3/2024    Procedure: ARTHROSCOPY, KNEE, WITH PARTIAL MEDIAL MENISCECTOMY;  Surgeon: Joshua Dahl MD;  Location: [facility];  Service: Orthopedics;  Laterality: Right;    CARPAL TUNNEL RELEASE      CARPAL TUNNEL RELEASE       SECTION      CHOLECYSTECTOMY      ESOPHAGOGASTRODUODENOSCOPY  2021    HYSTERECTOMY      KNEE ARTHROSCOPY W/ PLICA EXCISION Right 9/3/2024    Procedure: EXCISION, PLICA, KNEE, ARTHROSCOPIC;  Surgeon: Joshua Dahl MD;  Location: [facility];  Service: Orthopedics;  Laterality: Right;    MICROFRACTURE Right 9/3/2024    Procedure: MICROFRACTURE MEDIAL FEMORAL CONDYLE;  Surgeon: Joshua Dahl MD;  Location: [facility];  Service: Orthopedics;  Laterality: Right;        Review of patient's allergies indicates:   Allergen Reactions    Phenergan plain Other (See Comments)     Pt states feeling 'funny'         Ortho Exam :  Left hand is normal contour.  There is exquisite tenderness palpation over the palmar aspect of the thumb MCP joint.  Attempted IP joint flexion resulted in locking requiring manual reduction.    Radiographic Examination:  Left hand 11/20/2024    Technique:  Three views AP lateral and oblique    Findings:  Bones well mineralized.  Joints are normally aligned.  He was no evidence of fracture, dislocation, pathologic bone or significant DJD.    Impression:   See Above    Assessment and Plan  Patient Active Problem List    Diagnosis Date Noted    Acute medial meniscus tear of right knee 06/17/2024    Acute pain of right shoulder 01/19/2023    Sickle cell trait 01/19/2023    Fatigue 01/19/2023    Acute superficial gastritis without hemorrhage     Constipation 07/06/2021    ESTES (nonalcoholic steatohepatitis) 06/10/2021    Restless legs 06/10/2021    Elevated liver enzymes 05/17/2021    Fatty liver 05/17/2021    Epigastric pain 05/11/2021    Esophageal dysphagia 05/11/2021    Type 2 diabetes mellitus without complication, without long-term current use of insulin 03/26/2021    Essential hypertension 03/26/2021    Gastroesophageal reflux disease 03/26/2021    Hyperlipidemia 03/26/2021    Obesity 03/26/2021    Anxiety 03/26/2021    Depression 03/26/2021    Impression:  Nodular tenosynovitis (trigger finger)-left thumb   Plan:  He was offered A1 pulley release left thumb outpatient general or beer block anesthesia.  The potential benefits and risks of surgery outlined to include but not limited to bleeding, infection, damage to blood vessels and nerves, need for further surgery, other risks and complications including even death the patient wished to proceed.      Joshua Dahl M.D.

## 2024-12-06 ENCOUNTER — PATIENT MESSAGE (OUTPATIENT)
Dept: GASTROENTEROLOGY | Facility: CLINIC | Age: 49
End: 2024-12-06
Payer: COMMERCIAL

## 2024-12-06 DIAGNOSIS — R13.10 DYSPHAGIA, UNSPECIFIED TYPE: Primary | ICD-10-CM

## 2024-12-17 ENCOUNTER — TELEPHONE (OUTPATIENT)
Dept: ORTHOPEDICS | Facility: CLINIC | Age: 49
End: 2024-12-17
Payer: COMMERCIAL

## 2024-12-17 NOTE — TELEPHONE ENCOUNTER
Talked with patient about repeat labwork. Patient states she had forgotten but will come by today or tomorrow to have labwork done.

## 2024-12-17 NOTE — TELEPHONE ENCOUNTER
Talked with patient about BG level of 550. Patient states she has been holding all diabetic medications because she has a procedure tomorrow and then the surgery Thursday. Let patient know to keep holding the metformin and monjauro but to make sure she takes her insulin after her procedure tomorrow. Let her know outpt surgery will recheck her BG the morning of surgery to make sure BG has come down. Patient verbalized understanding.

## 2024-12-18 ENCOUNTER — ANESTHESIA (OUTPATIENT)
Dept: GASTROENTEROLOGY | Facility: HOSPITAL | Age: 49
End: 2024-12-18
Payer: COMMERCIAL

## 2024-12-18 ENCOUNTER — HOSPITAL ENCOUNTER (OUTPATIENT)
Dept: GASTROENTEROLOGY | Facility: HOSPITAL | Age: 49
Discharge: HOME | End: 2024-12-18
Attending: INTERNAL MEDICINE | Admitting: INTERNAL MEDICINE
Payer: COMMERCIAL

## 2024-12-18 ENCOUNTER — ANESTHESIA EVENT (OUTPATIENT)
Dept: GASTROENTEROLOGY | Facility: HOSPITAL | Age: 49
End: 2024-12-18
Payer: COMMERCIAL

## 2024-12-18 VITALS
WEIGHT: 200 LBS | TEMPERATURE: 98 F | RESPIRATION RATE: 12 BRPM | HEIGHT: 68 IN | BODY MASS INDEX: 30.31 KG/M2 | OXYGEN SATURATION: 99 % | SYSTOLIC BLOOD PRESSURE: 135 MMHG | DIASTOLIC BLOOD PRESSURE: 86 MMHG | HEART RATE: 59 BPM

## 2024-12-18 DIAGNOSIS — R73.9 HYPERGLYCEMIA: ICD-10-CM

## 2024-12-18 DIAGNOSIS — K29.00 ACUTE SUPERFICIAL GASTRITIS WITHOUT HEMORRHAGE: ICD-10-CM

## 2024-12-18 DIAGNOSIS — R13.19 ESOPHAGEAL DYSPHAGIA: Primary | ICD-10-CM

## 2024-12-18 DIAGNOSIS — R13.10 DYSPHAGIA, UNSPECIFIED TYPE: ICD-10-CM

## 2024-12-18 LAB
GLUCOSE SERPL-MCNC: 290 MG/DL (ref 70–105)
GLUCOSE SERPL-MCNC: 315 MG/DL (ref 70–105)

## 2024-12-18 PROCEDURE — 88305 TISSUE EXAM BY PATHOLOGIST: CPT | Mod: TC,91,SUR | Performed by: INTERNAL MEDICINE

## 2024-12-18 PROCEDURE — 43450 DILATE ESOPHAGUS 1/MULT PASS: CPT | Performed by: INTERNAL MEDICINE

## 2024-12-18 PROCEDURE — 37000008 HC ANESTHESIA 1ST 15 MINUTES

## 2024-12-18 PROCEDURE — 43239 EGD BIOPSY SINGLE/MULTIPLE: CPT | Performed by: INTERNAL MEDICINE

## 2024-12-18 PROCEDURE — 63600175 PHARM REV CODE 636 W HCPCS: Performed by: NURSE ANESTHETIST, CERTIFIED REGISTERED

## 2024-12-18 PROCEDURE — 27201423 OPTIME MED/SURG SUP & DEVICES STERILE SUPPLY

## 2024-12-18 PROCEDURE — 43450 DILATE ESOPHAGUS 1/MULT PASS: CPT | Mod: 51,,, | Performed by: INTERNAL MEDICINE

## 2024-12-18 PROCEDURE — 63600175 PHARM REV CODE 636 W HCPCS: Performed by: INTERNAL MEDICINE

## 2024-12-18 PROCEDURE — 82962 GLUCOSE BLOOD TEST: CPT

## 2024-12-18 PROCEDURE — 43239 EGD BIOPSY SINGLE/MULTIPLE: CPT | Mod: ,,, | Performed by: INTERNAL MEDICINE

## 2024-12-18 RX ORDER — PROPOFOL 10 MG/ML
VIAL (ML) INTRAVENOUS
Status: DISCONTINUED | OUTPATIENT
Start: 2024-12-18 | End: 2024-12-18

## 2024-12-18 RX ORDER — FENTANYL CITRATE 50 UG/ML
INJECTION, SOLUTION INTRAMUSCULAR; INTRAVENOUS
Status: DISCONTINUED | OUTPATIENT
Start: 2024-12-18 | End: 2024-12-18

## 2024-12-18 RX ORDER — LIDOCAINE HYDROCHLORIDE 20 MG/ML
INJECTION, SOLUTION EPIDURAL; INFILTRATION; INTRACAUDAL; PERINEURAL
Status: DISCONTINUED | OUTPATIENT
Start: 2024-12-18 | End: 2024-12-18

## 2024-12-18 RX ORDER — SODIUM CHLORIDE 0.9 % (FLUSH) 0.9 %
10 SYRINGE (ML) INJECTION EVERY 6 HOURS PRN
Status: DISCONTINUED | OUTPATIENT
Start: 2024-12-18 | End: 2024-12-19 | Stop reason: HOSPADM

## 2024-12-18 RX ORDER — SODIUM CHLORIDE, SODIUM LACTATE, POTASSIUM CHLORIDE, CALCIUM CHLORIDE 600; 310; 30; 20 MG/100ML; MG/100ML; MG/100ML; MG/100ML
INJECTION, SOLUTION INTRAVENOUS CONTINUOUS
Status: DISCONTINUED | OUTPATIENT
Start: 2024-12-18 | End: 2024-12-19 | Stop reason: HOSPADM

## 2024-12-18 RX ADMIN — FENTANYL CITRATE 100 MCG: 50 INJECTION INTRAMUSCULAR; INTRAVENOUS at 09:12

## 2024-12-18 RX ADMIN — PROPOFOL 80 MG: 10 INJECTION, EMULSION INTRAVENOUS at 09:12

## 2024-12-18 RX ADMIN — HUMAN INSULIN 7 UNITS: 100 INJECTION, SOLUTION SUBCUTANEOUS at 09:12

## 2024-12-18 RX ADMIN — LIDOCAINE HYDROCHLORIDE 80 MG: 20 INJECTION, SOLUTION INTRAVENOUS at 09:12

## 2024-12-18 NOTE — ANESTHESIA POSTPROCEDURE EVALUATION
Anesthesia Post Evaluation    Patient: Cole Herrera    Procedure(s) Performed: * No procedures listed *    Final Anesthesia Type: MAC      Patient location during evaluation: GI PACU  Patient participation: Yes- Able to Participate  Level of consciousness: awake and alert  Post-procedure vital signs: reviewed and stable  Pain management: adequate  Airway patency: patent    PONV status at discharge: No PONV  Anesthetic complications: no      Cardiovascular status: blood pressure returned to baseline and hemodynamically stable  Respiratory status: spontaneous ventilation  Hydration status: euvolemic  Follow-up not needed.  Comments: Refer to nursing notes for pain/xiao score upon discharge from recovery.              Vitals Value Taken Time   /86 12/18/24 1002   Temp 97.5 12/18/24 1004   Pulse 59 12/18/24 1004   Resp 18 12/18/24 1004   SpO2 98 % 12/18/24 1004   Vitals shown include unfiled device data.      No case tracking events are documented in the log.      Pain/Xiao Score: Xiao Score: 8 (12/18/2024  9:29 AM)

## 2024-12-18 NOTE — ANESTHESIA PREPROCEDURE EVALUATION
12/18/2024  Cole Herrera is a 49 y.o., female.      Pre-op Assessment    I have reviewed the Patient Summary Reports.     I have reviewed the Nursing Notes. I have reviewed the NPO Status.   I have reviewed the Medications.     Review of Systems  Anesthesia Hx:  No problems with previous Anesthesia                Social:  Non-Smoker       Cardiovascular:     Hypertension           hyperlipidemia                         Hypertension         Hepatic/GI:     GERD Liver Disease, Hepatitis       Gerd       Liver Disease, Hepatitis        Endocrine:  Diabetes    Diabetes                    Obesity / BMI > 30  Psych:  Psychiatric History anxiety depression               Anesthesia Plan  Type of Anesthesia, risks & benefits discussed:    Anesthesia Type: MAC  Intra-op Monitoring Plan: Standard ASA Monitors  Post Op Pain Control Plan: IV/PO Opioids PRN  Induction:  IV  Informed Consent: Informed consent signed with the Patient and all parties understand the risks and agree with anesthesia plan.  All questions answered. Patient consented to blood products? Yes  ASA Score: 3  Day of Surgery Review of History & Physical: H&P Update referred to the surgeon/provider.I have interviewed and examined the patient. I have reviewed the patient's H&P dated: There are no significant changes.     Ready For Surgery From Anesthesia Perspective.   .

## 2024-12-18 NOTE — TRANSFER OF CARE
"Anesthesia Transfer of Care Note    Patient: Cole Herrera    Procedure(s) Performed: * No procedures listed *    Patient location: GI    Anesthesia Type: MAC    Transport from OR: Transported from OR on room air with adequate spontaneous ventilation. Continuous ECG monitoring in transport. Continuous SpO2 monitoring in transport    Post pain: adequate analgesia    Post assessment: no apparent anesthetic complications    Post vital signs: stable    Level of consciousness: sedated and responds to stimulation    Nausea/Vomiting: no nausea/vomiting    Complications: none    Transfer of care protocol was followedComments: Good SV continue, NAD, VSS, RTRN    Last vitals: Visit Vitals  /74   Pulse 69   Temp 36.4 °C (97.5 °F)   Resp 15   Ht 5' 8" (1.727 m)   Wt 90.7 kg (200 lb)   SpO2 100%   Breastfeeding No   BMI 30.41 kg/m²     "

## 2024-12-18 NOTE — H&P
[facility] - Endoscopy  Gastroenterology  H&P    Patient Name: Cole Herrera  MRN: [id number]  Admission Date: 2024  Code Status: Prior    Attending Provider: Kirk Ferrera MD   Primary Care Physician: Shaun Capone DO  Principal Problem:<principal problem not specified>    Subjective:     History of Present Illness:  This patient is a 49-year-old female with complaints of esophageal dysphagia.  She does have gastritis and reflux esophagitis.  She is diabetic with hyperglycemia.    Past Medical History:   Diagnosis Date    Allergy     Depression     Diabetes mellitus, type 2     Epigastric pain 2021    GERD (gastroesophageal reflux disease)     Hypertension     Sickle cell anemia     trait not anemia       Past Surgical History:   Procedure Laterality Date    APPENDECTOMY      ARTHROSCOPY OF KNEE Right 9/3/2024    Procedure: ARTHROSCOPY, KNEE;  Surgeon: Joshua Dahl MD;  Location: [facility];  Service: Orthopedics;  Laterality: Right;    ARTHROSCOPY, KNEE, WITH MEDIAL OR LATERAL MENISCECTOMY Right 9/3/2024    Procedure: ARTHROSCOPY, KNEE, WITH PARTIAL MEDIAL MENISCECTOMY;  Surgeon: Joshua Dahl MD;  Location: [facility] OR;  Service: Orthopedics;  Laterality: Right;    CARPAL TUNNEL RELEASE      CARPAL TUNNEL RELEASE       SECTION      CHOLECYSTECTOMY      ESOPHAGOGASTRODUODENOSCOPY  2021    HYSTERECTOMY      KNEE ARTHROSCOPY W/ PLICA EXCISION Right 9/3/2024    Procedure: EXCISION, PLICA, KNEE, ARTHROSCOPIC;  Surgeon: Joshua Dahl MD;  Location: [facility] OR;  Service: Orthopedics;  Laterality: Right;    MICROFRACTURE Right 9/3/2024    Procedure: MICROFRACTURE MEDIAL FEMORAL CONDYLE;  Surgeon: Joshua Dahl MD;  Location: [facility] OR;  Service: Orthopedics;  Laterality: Right;       Review of patient's allergies indicates:   Allergen Reactions    Phenergan plain Other (See Comments)     Pt states feeling 'funny'      Family History       Problem Relation (Age of Onset)    Arthritis Mother    Cancer Father    Diabetes Mother    Glaucoma Mother    Hyperlipidemia Mother    Hypertension Mother          Tobacco Use    Smoking status: Some Days    Smokeless tobacco: Never    Tobacco comments:     Medical marijuana   Substance and Sexual Activity    Alcohol use: Never    Drug use: Yes     Types: Marijuana    Sexual activity: Yes     Partners: Male     Review of Systems   Respiratory:  Positive for choking.    Cardiovascular: Negative.    Gastrointestinal: Negative.      Objective:     Vital Signs (Most Recent):  Temp: 98.1 °F (36.7 °C) (12/18/24 0845)  Pulse: 73 (12/18/24 0845)  Resp: 13 (12/18/24 0845)  BP: (!) 136/91 (12/18/24 0845)  SpO2: 100 % (12/18/24 0845) Vital Signs (24h Range):  Temp:  [98.1 °F (36.7 °C)] 98.1 °F (36.7 °C)  Pulse:  [73] 73  Resp:  [13] 13  SpO2:  [100 %] 100 %  BP: (136)/(91) 136/91     Weight: 90.7 kg (200 lb) (12/18/24 0845)  Body mass index is 30.41 kg/m².    No intake or output data in the 24 hours ending 12/18/24 0907    Lines/Drains/Airways       Peripheral Intravenous Line  Duration                  Peripheral IV - Single Lumen 12/18/24 0845 22 G Posterior;Right Hand <1 day                    Physical Exam  Vitals reviewed.   Constitutional:       General: She is not in acute distress.     Appearance: Normal appearance. She is well-developed. She is not ill-appearing.   HENT:      Head: Normocephalic and atraumatic.      Nose: Nose normal.   Eyes:      Pupils: Pupils are equal, round, and reactive to light.   Cardiovascular:      Rate and Rhythm: Normal rate and regular rhythm.   Pulmonary:      Effort: Pulmonary effort is normal.      Breath sounds: Normal breath sounds. No wheezing.   Abdominal:      General: Abdomen is flat. Bowel sounds are normal. There is no distension.      Palpations: Abdomen is soft.      Tenderness: There is no abdominal tenderness. There is no guarding.   Skin:      General: Skin is warm and dry.      Coloration: Skin is not jaundiced.   Neurological:      Mental Status: She is alert.   Psychiatric:         Attention and Perception: Attention normal.         Mood and Affect: Affect normal.         Speech: Speech normal.         Behavior: Behavior is cooperative.      Comments: Pt was calm while speaking.         Significant Labs:  CBC:   Recent Labs   Lab 12/17/24  1455   WBC 8.08   HGB 12.9   HCT 38.2        CMP:   Recent Labs   Lab 12/17/24  1455   *   CALCIUM 9.1   *   K 3.9   CO2 30*   CL 98   BUN 8   CREATININE 0.96       Significant Imaging:  Imaging results within the past 24 hours have been reviewed.    Assessment/Plan:     There are no hospital problems to display for this patient.        Impression:  GERD, esophageal dysphagia.               Plan:  EGD with dilation of the esophagus    Kirk Ferrera MD  Gastroenterology  [facility] - Endoscopy

## 2024-12-18 NOTE — DISCHARGE INSTRUCTIONS
Procedure Date  12/18/24     Impression  Overall Impression:   Performed forceps biopsies in the stomach  Performed forceps biopsies in the esophagus  Dilated with Wilcox dilator  Erythematous mucosa in the fundus of the stomach and antrum; performed cold forceps biopsy  Mucosa of the esophagus was normal-appearing with the Z-line at 38 cm.  The distal esophagus was biopsied and the esophagus was dilated with a 48 French Wilcox dilator.  The stomach had mild erythema and a fundic gland polyp was biopsied.  No gastric ulcers were present.  Biopsies were obtained from the antrum and fundus.  The pyloric channel was patent.  Mucosa of the duodenal bulb through 3rd portion was normal-appearing.  Impression:  Esophageal dysphagia, gastritis, gastric polyp, status post dilation of the esophagus.     Recommendation  Await pathology results, due: 12/20/2024      Disposition:  Discharge patient to home in stable condition.  The patient should monitor her blood glucose as she is being given 7 units of regular insulin in the GI lab today.  Resume diet, no driving until tomorrow.  Resume Protonix as needed.  Repeat dilation of the esophagus p.r.n..  Follow up with PCP as scheduled, return GI clinic p.r.n..  Indication  , esophageal dysphagia

## 2024-12-19 ENCOUNTER — ANESTHESIA EVENT (OUTPATIENT)
Dept: SURGERY | Facility: HOSPITAL | Age: 49
End: 2024-12-19
Payer: COMMERCIAL

## 2024-12-19 ENCOUNTER — HOSPITAL ENCOUNTER (OUTPATIENT)
Facility: HOSPITAL | Age: 49
Discharge: HOME | End: 2024-12-19
Attending: ORTHOPAEDIC SURGERY | Admitting: ORTHOPAEDIC SURGERY
Payer: COMMERCIAL

## 2024-12-19 ENCOUNTER — ANESTHESIA (OUTPATIENT)
Dept: SURGERY | Facility: HOSPITAL | Age: 49
End: 2024-12-19
Payer: COMMERCIAL

## 2024-12-19 VITALS
DIASTOLIC BLOOD PRESSURE: 86 MMHG | HEART RATE: 67 BPM | TEMPERATURE: 98 F | OXYGEN SATURATION: 98 % | HEIGHT: 68 IN | WEIGHT: 200 LBS | BODY MASS INDEX: 30.31 KG/M2 | SYSTOLIC BLOOD PRESSURE: 124 MMHG | RESPIRATION RATE: 16 BRPM

## 2024-12-19 DIAGNOSIS — Z01.810 PREOPERATIVE CARDIOVASCULAR EXAMINATION: ICD-10-CM

## 2024-12-19 DIAGNOSIS — M65.312 TRIGGER FINGER OF LEFT THUMB: Primary | ICD-10-CM

## 2024-12-19 LAB
DHEA SERPL-MCNC: NORMAL
ESTROGEN SERPL-MCNC: NORMAL PG/ML
GLUCOSE SERPL-MCNC: 195 MG/DL (ref 70–105)
GLUCOSE SERPL-MCNC: 317 MG/DL (ref 70–105)
INSULIN SERPL-ACNC: NORMAL U[IU]/ML
LAB AP GROSS DESCRIPTION: NORMAL
LAB AP LABORATORY NOTES: NORMAL
T3RU NFR SERPL: NORMAL %

## 2024-12-19 PROCEDURE — 36000707: Performed by: ORTHOPAEDIC SURGERY

## 2024-12-19 PROCEDURE — 36000706: Performed by: ORTHOPAEDIC SURGERY

## 2024-12-19 PROCEDURE — 71000033 HC RECOVERY, INTIAL HOUR: Performed by: ORTHOPAEDIC SURGERY

## 2024-12-19 PROCEDURE — 71000015 HC POSTOP RECOV 1ST HR: Performed by: ORTHOPAEDIC SURGERY

## 2024-12-19 PROCEDURE — 93010 ELECTROCARDIOGRAM REPORT: CPT | Mod: ,,, | Performed by: INTERNAL MEDICINE

## 2024-12-19 PROCEDURE — 37000008 HC ANESTHESIA 1ST 15 MINUTES: Performed by: ORTHOPAEDIC SURGERY

## 2024-12-19 PROCEDURE — 93005 ELECTROCARDIOGRAM TRACING: CPT

## 2024-12-19 PROCEDURE — 82962 GLUCOSE BLOOD TEST: CPT

## 2024-12-19 PROCEDURE — 25000003 PHARM REV CODE 250: Performed by: ORTHOPAEDIC SURGERY

## 2024-12-19 PROCEDURE — 26356 REPAIR FINGER/HAND TENDON: CPT | Mod: LT,,, | Performed by: ORTHOPAEDIC SURGERY

## 2024-12-19 PROCEDURE — 63600175 PHARM REV CODE 636 W HCPCS: Performed by: ORTHOPAEDIC SURGERY

## 2024-12-19 PROCEDURE — 63600175 PHARM REV CODE 636 W HCPCS: Mod: JZ,JG | Performed by: ORTHOPAEDIC SURGERY

## 2024-12-19 PROCEDURE — 63600175 PHARM REV CODE 636 W HCPCS: Performed by: NURSE ANESTHETIST, CERTIFIED REGISTERED

## 2024-12-19 PROCEDURE — 63600175 PHARM REV CODE 636 W HCPCS: Performed by: ANESTHESIOLOGY

## 2024-12-19 PROCEDURE — 37000009 HC ANESTHESIA EA ADD 15 MINS: Performed by: ORTHOPAEDIC SURGERY

## 2024-12-19 RX ORDER — DIPHENHYDRAMINE HYDROCHLORIDE 50 MG/ML
25 INJECTION INTRAMUSCULAR; INTRAVENOUS EVERY 6 HOURS PRN
Status: DISCONTINUED | OUTPATIENT
Start: 2024-12-19 | End: 2024-12-19 | Stop reason: HOSPADM

## 2024-12-19 RX ORDER — MIDAZOLAM HYDROCHLORIDE 1 MG/ML
INJECTION INTRAMUSCULAR; INTRAVENOUS
Status: DISCONTINUED | OUTPATIENT
Start: 2024-12-19 | End: 2024-12-19

## 2024-12-19 RX ORDER — BUPIVACAINE HYDROCHLORIDE 2.5 MG/ML
INJECTION, SOLUTION EPIDURAL; INFILTRATION; INTRACAUDAL
Status: DISCONTINUED | OUTPATIENT
Start: 2024-12-19 | End: 2024-12-19 | Stop reason: HOSPADM

## 2024-12-19 RX ORDER — HYDROMORPHONE HYDROCHLORIDE 2 MG/ML
0.5 INJECTION, SOLUTION INTRAMUSCULAR; INTRAVENOUS; SUBCUTANEOUS EVERY 5 MIN PRN
Status: DISCONTINUED | OUTPATIENT
Start: 2024-12-19 | End: 2024-12-19 | Stop reason: HOSPADM

## 2024-12-19 RX ORDER — MORPHINE SULFATE 10 MG/ML
4 INJECTION INTRAMUSCULAR; INTRAVENOUS; SUBCUTANEOUS EVERY 5 MIN PRN
Status: DISCONTINUED | OUTPATIENT
Start: 2024-12-19 | End: 2024-12-19 | Stop reason: HOSPADM

## 2024-12-19 RX ORDER — GLUCAGON 1 MG
1 KIT INJECTION
Status: DISCONTINUED | OUTPATIENT
Start: 2024-12-19 | End: 2024-12-19 | Stop reason: HOSPADM

## 2024-12-19 RX ORDER — HYDROCODONE BITARTRATE AND ACETAMINOPHEN 5; 325 MG/1; MG/1
1 TABLET ORAL EVERY 6 HOURS PRN
Qty: 28 TABLET | Refills: 0 | Status: SHIPPED | OUTPATIENT
Start: 2024-12-19 | End: 2024-12-26

## 2024-12-19 RX ORDER — ONDANSETRON HYDROCHLORIDE 2 MG/ML
INJECTION, SOLUTION INTRAVENOUS
Status: DISCONTINUED | OUTPATIENT
Start: 2024-12-19 | End: 2024-12-19

## 2024-12-19 RX ORDER — MEPERIDINE HYDROCHLORIDE 25 MG/ML
25 INJECTION INTRAMUSCULAR; INTRAVENOUS; SUBCUTANEOUS ONCE AS NEEDED
Status: DISCONTINUED | OUTPATIENT
Start: 2024-12-19 | End: 2024-12-19 | Stop reason: HOSPADM

## 2024-12-19 RX ORDER — HYDROCODONE BITARTRATE AND ACETAMINOPHEN 10; 325 MG/1; MG/1
1 TABLET ORAL EVERY 4 HOURS PRN
Status: DISCONTINUED | OUTPATIENT
Start: 2024-12-19 | End: 2024-12-19 | Stop reason: HOSPADM

## 2024-12-19 RX ORDER — ONDANSETRON HYDROCHLORIDE 2 MG/ML
4 INJECTION, SOLUTION INTRAVENOUS DAILY PRN
Status: DISCONTINUED | OUTPATIENT
Start: 2024-12-19 | End: 2024-12-19 | Stop reason: HOSPADM

## 2024-12-19 RX ORDER — CEFAZOLIN 2 G/1
2 INJECTION, POWDER, FOR SOLUTION INTRAMUSCULAR; INTRAVENOUS
Status: COMPLETED | OUTPATIENT
Start: 2024-12-19 | End: 2024-12-19

## 2024-12-19 RX ORDER — PROPOFOL 10 MG/ML
INJECTION, EMULSION INTRAVENOUS
Status: DISCONTINUED | OUTPATIENT
Start: 2024-12-19 | End: 2024-12-19

## 2024-12-19 RX ORDER — SODIUM CHLORIDE 9 MG/ML
INJECTION, SOLUTION INTRAVENOUS CONTINUOUS
Status: DISCONTINUED | OUTPATIENT
Start: 2024-12-19 | End: 2024-12-19 | Stop reason: HOSPADM

## 2024-12-19 RX ORDER — LIDOCAINE HYDROCHLORIDE 20 MG/ML
INJECTION, SOLUTION EPIDURAL; INFILTRATION; INTRACAUDAL; PERINEURAL
Status: DISCONTINUED | OUTPATIENT
Start: 2024-12-19 | End: 2024-12-19

## 2024-12-19 RX ORDER — ACETAMINOPHEN 500 MG
1000 TABLET ORAL EVERY 6 HOURS PRN
Status: DISCONTINUED | OUTPATIENT
Start: 2024-12-19 | End: 2024-12-19 | Stop reason: HOSPADM

## 2024-12-19 RX ORDER — HYDROCODONE BITARTRATE AND ACETAMINOPHEN 5; 325 MG/1; MG/1
1 TABLET ORAL EVERY 4 HOURS PRN
Status: DISCONTINUED | OUTPATIENT
Start: 2024-12-19 | End: 2024-12-19 | Stop reason: HOSPADM

## 2024-12-19 RX ORDER — ONDANSETRON 4 MG/1
8 TABLET, ORALLY DISINTEGRATING ORAL EVERY 8 HOURS PRN
Status: DISCONTINUED | OUTPATIENT
Start: 2024-12-19 | End: 2024-12-19 | Stop reason: HOSPADM

## 2024-12-19 RX ORDER — IPRATROPIUM BROMIDE AND ALBUTEROL SULFATE 2.5; .5 MG/3ML; MG/3ML
3 SOLUTION RESPIRATORY (INHALATION) ONCE AS NEEDED
Status: DISCONTINUED | OUTPATIENT
Start: 2024-12-19 | End: 2024-12-19 | Stop reason: HOSPADM

## 2024-12-19 RX ORDER — FENTANYL CITRATE 50 UG/ML
INJECTION, SOLUTION INTRAMUSCULAR; INTRAVENOUS
Status: DISCONTINUED | OUTPATIENT
Start: 2024-12-19 | End: 2024-12-19

## 2024-12-19 RX ORDER — ONDANSETRON HYDROCHLORIDE 2 MG/ML
4 INJECTION, SOLUTION INTRAVENOUS ONCE
Status: COMPLETED | OUTPATIENT
Start: 2024-12-19 | End: 2024-12-19

## 2024-12-19 RX ADMIN — SODIUM CHLORIDE: 9 INJECTION, SOLUTION INTRAVENOUS at 04:12

## 2024-12-19 RX ADMIN — CEFAZOLIN 2 G: 2 INJECTION, POWDER, FOR SOLUTION INTRAMUSCULAR; INTRAVENOUS at 04:12

## 2024-12-19 RX ADMIN — MIDAZOLAM HYDROCHLORIDE 2 MG: 1 INJECTION, SOLUTION INTRAMUSCULAR; INTRAVENOUS at 04:12

## 2024-12-19 RX ADMIN — LIDOCAINE HYDROCHLORIDE 100 MG: 20 INJECTION, SOLUTION INTRAVENOUS at 04:12

## 2024-12-19 RX ADMIN — FENTANYL CITRATE 100 MCG: 50 INJECTION, SOLUTION INTRAMUSCULAR; INTRAVENOUS at 04:12

## 2024-12-19 RX ADMIN — ONDANSETRON 4 MG: 2 INJECTION INTRAMUSCULAR; INTRAVENOUS at 04:12

## 2024-12-19 RX ADMIN — PROPOFOL 150 MG: 10 INJECTION, EMULSION INTRAVENOUS at 04:12

## 2024-12-19 RX ADMIN — ONDANSETRON 4 MG: 2 INJECTION INTRAMUSCULAR; INTRAVENOUS at 02:12

## 2024-12-19 NOTE — SUBJECTIVE & OBJECTIVE
Past Medical History:   Diagnosis Date    Allergy     Depression     Diabetes mellitus, type 2     Epigastric pain 2021    GERD (gastroesophageal reflux disease)     Hypertension     Sickle cell anemia     trait not anemia       Past Surgical History:   Procedure Laterality Date    APPENDECTOMY      ARTHROSCOPY OF KNEE Right 9/3/2024    Procedure: ARTHROSCOPY, KNEE;  Surgeon: Joshua Dahl MD;  Location: [facility] OR;  Service: Orthopedics;  Laterality: Right;    ARTHROSCOPY, KNEE, WITH MEDIAL OR LATERAL MENISCECTOMY Right 9/3/2024    Procedure: ARTHROSCOPY, KNEE, WITH PARTIAL MEDIAL MENISCECTOMY;  Surgeon: Joshua Dahl MD;  Location: [facility] OR;  Service: Orthopedics;  Laterality: Right;    CARPAL TUNNEL RELEASE      CARPAL TUNNEL RELEASE       SECTION      CHOLECYSTECTOMY      ESOPHAGOGASTRODUODENOSCOPY  2021    HYSTERECTOMY      KNEE ARTHROSCOPY W/ PLICA EXCISION Right 9/3/2024    Procedure: EXCISION, PLICA, KNEE, ARTHROSCOPIC;  Surgeon: Joshua Dahl MD;  Location: [facility] OR;  Service: Orthopedics;  Laterality: Right;    MICROFRACTURE Right 9/3/2024    Procedure: MICROFRACTURE MEDIAL FEMORAL CONDYLE;  Surgeon: Joshua Dahl MD;  Location: [facility] OR;  Service: Orthopedics;  Laterality: Right;       Review of patient's allergies indicates:   Allergen Reactions    Phenergan plain Other (See Comments)     Pt states feeling 'funny'       No current facility-administered medications for this encounter.     Current Outpatient Medications   Medication Sig    atorvastatin (LIPITOR) 10 MG tablet Take 1 tablet (10 mg total) by mouth once daily.    citalopram (CELEXA) 20 MG tablet Take 1 tablet by mouth once daily    cyclobenzaprine (FLEXERIL) 10 MG tablet Take 10 mg by mouth 3 (three) times daily. (Patient not taking: Reported on 9/10/2024)    fluconazole (DIFLUCAN) 150 MG Tab Take 1 tablet (150 mg total) by mouth once a week. (Patient not  taking: Reported on 9/10/2024)    gabapentin (NEURONTIN) 300 MG capsule Take 1 capsule (300 mg total) by mouth once daily.    insulin degludec (TRESIBA FLEXTOUCH U-100) 100 unit/mL (3 mL) InPn Inject 60 Units into the skin nightly.    meloxicam (MOBIC) 15 MG tablet Take 1 tablet (15 mg total) by mouth daily as needed for Pain.    metFORMIN (GLUCOPHAGE) 850 MG tablet Take 1 tablet (850 mg total) by mouth 2 (two) times daily with meals.    naproxen (NAPROSYN) 500 MG tablet Take 1 tablet (500 mg total) by mouth 2 (two) times daily.    NOVOLOG FLEXPEN U-100 INSULIN 100 unit/mL (3 mL) InPn pen Inject 40 Units into the skin 3 (three) times daily.    pantoprazole (PROTONIX) 40 MG tablet Take 1 tablet (40 mg total) by mouth once daily.    rOPINIRole (REQUIP) 0.25 MG tablet Take 1 tablet (0.25 mg total) by mouth every evening.    tirzepatide (MOUNJARO) 2.5 mg/0.5 mL PnIj Inject 2.5 mg into the skin every 7 days.    triamterene-hydrochlorothiazide 37.5-25 mg (MAXZIDE-25) 37.5-25 mg per tablet Take 1 tablet by mouth once daily     Family History       Problem Relation (Age of Onset)    Arthritis Mother    Cancer Father    Diabetes Mother    Glaucoma Mother    Hyperlipidemia Mother    Hypertension Mother          Tobacco Use    Smoking status: Some Days    Smokeless tobacco: Never    Tobacco comments:     Medical marijuana   Substance and Sexual Activity    Alcohol use: Never    Drug use: Yes     Types: Marijuana    Sexual activity: Yes     Partners: Male     Review of Systems   Constitutional: Negative.     Objective:     Vital Signs (Most Recent):    Vital Signs (24h Range):              There is no height or weight on file to calculate BMI.    No intake or output data in the 24 hours ending 12/19/24 1020     General    Vitals reviewed.  Constitutional: She is oriented to person, place, and time. She appears well-developed and well-nourished.   HENT:   Head: Normocephalic and atraumatic.   Eyes: EOM are normal. Pupils are  equal, round, and reactive to light.   Cardiovascular:  Normal rate, regular rhythm and normal heart sounds.            Pulmonary/Chest: Effort normal and breath sounds normal.   Abdominal: Soft. Bowel sounds are normal.   Neurological: She is alert and oriented to person, place, and time.   Psychiatric: She has a normal mood and affect. Her behavior is normal. Thought content normal.             Right Hand/Wrist Exam   Right hand exam is normal.      Left Hand/Wrist Exam     Pain   Hand - The patient exhibits pain of the thumb MCP.    Other     Sensory Exam  Median Distribution: normal  Ulnar Distribution: normal  Radial Distribution: normal          Vascular Exam       Capillary Refill  Left Hand: normal capillary refill           Significant Labs: All pertinent labs within the past 24 hours have been reviewed.    Significant Imaging: I have reviewed all pertinent imaging results/findings.

## 2024-12-19 NOTE — TRANSFER OF CARE
"Anesthesia Transfer of Care Note    Patient: Cole Herrera    Procedure(s) Performed: Procedure(s) (LRB):  RELEASE, TRIGGER FINGER (Left)    Patient location: PACU    Anesthesia Type: general    Transport from OR: Transported from OR on room air with adequate spontaneous ventilation    Post pain: adequate analgesia    Post assessment: no apparent anesthetic complications    Post vital signs: stable    Level of consciousness: sedated    Nausea/Vomiting: no nausea/vomiting    Complications: none    Transfer of care protocol was followed      Last vitals: Visit Vitals  /86   Pulse 80   Temp 36.4 °C (97.6 °F) (Oral)   Resp 20   Ht 5' 8" (1.727 m)   Wt 90.7 kg (200 lb)   SpO2 100%   Breastfeeding No   BMI 30.41 kg/m²     "

## 2024-12-19 NOTE — DISCHARGE SUMMARY
[facility] - Orthopedic Periop Services  Discharge Note  Short Stay    Procedure(s) (LRB):  RELEASE, TRIGGER FINGER (Left)  REPAIR, TENDON, FLEXOR (Left)      OUTCOME: Patient tolerated treatment/procedure well without complication and is now ready for discharge.    DISPOSITION: Home or Self Care    FINAL DIAGNOSIS:  Trigger finger of left thumb    FOLLOWUP: In clinic    DISCHARGE INSTRUCTIONS:    Discharge Procedure Orders   Diet general     Keep surgical extremity elevated     Ice to affected area   Order Comments: using barrier between ice and skin (specify duration&frequency)     Remove dressing in 72 hours   Order Comments: Keep dressing in place for 72 hours     Change dressing (specify)   Order Comments: Dressing change: one time per day beginning 72 hours post op.     Call MD for:  temperature >100.4     Call MD for:  persistent nausea and vomiting     Call MD for:  severe uncontrolled pain     Call MD for:  difficulty breathing, headache or visual disturbances     Call MD for:  redness, tenderness, or signs of infection (pain, swelling, redness, odor or green/yellow discharge around incision site)     Call MD for:  hives     Call MD for:  persistent dizziness or light-headedness     Call MD for:  extreme fatigue     Activity as tolerated     Shower on day dressing removed (No bath)     Weight bearing as tolerated         Clinical Reference Documents Added to Patient Instructions         Document    [facility] OPIOID AVS FACTSHEET    TRIGGER FINGER DISCHARGE INSTRUCTIONS (ENGLISH)            TIME SPENT ON DISCHARGE: 15 minutes

## 2024-12-19 NOTE — ANESTHESIA PREPROCEDURE EVALUATION
12/19/2024  Cole Herrera is a 49 y.o., female.      Pre-op Assessment    I have reviewed the Patient Summary Reports.     I have reviewed the Nursing Notes. I have reviewed the NPO Status.   I have reviewed the Medications.     Review of Systems  Anesthesia Hx:  No problems with previous Anesthesia             Denies Family Hx of Anesthesia complications.    Denies Personal Hx of Anesthesia complications.                    Social:  Smoker, Recreational Drugs, No Alcohol Use       Cardiovascular:  Exercise tolerance: good   Hypertension           hyperlipidemia   ECG has been reviewed.                            Hepatic/GI:     GERD Liver Disease, Hepatitis  Taking GLP-1 Agonists Instructed to Hold for 7 Days No Reported GI Symptoms          Musculoskeletal:      Trigger finger of left thumb [M65.312]            Neurological:        Peripheral Neuropathy                          Endocrine:  Diabetes         Obesity / BMI > 30  Psych:  Psychiatric History anxiety                 Physical Exam  General: Well nourished, Cooperative and Alert    Airway:  Mallampati: II   Mouth Opening: Normal  TM Distance: Normal  Tongue: Normal  Neck ROM: Normal ROM    Dental:  Intact    Chest/Lungs:  Clear to auscultation, Normal Respiratory Rate    Heart:  Rate: Normal  Rhythm: Regular Rhythm        Chemistry        Component Value Date/Time     (L) 12/17/2024 1455    K 3.9 12/17/2024 1455    CL 98 12/17/2024 1455    CO2 30 (H) 12/17/2024 1455    BUN 8 12/17/2024 1455    CREATININE 0.96 12/17/2024 1455     (HH) 12/17/2024 1455        Component Value Date/Time    CALCIUM 9.1 12/17/2024 1455    ALKPHOS 117 (H) 04/04/2024 1406    AST 13 (L) 04/04/2024 1406    ALT 67 (H) 04/04/2024 1406    BILITOT 0.4 04/04/2024 1406    ESTGFRAFRICA 97 12/14/2021 1031    EGFRNONAA 76 03/23/2022 1738        Lab Results    Component Value Date    WBC 8.08 12/17/2024    HGB 12.9 12/17/2024    HCT 38.2 12/17/2024     12/17/2024     Results for orders placed or performed during the hospital encounter of 05/26/23   EKG 12-lead (Chest Pain) Age >30    Collection Time: 05/26/23 11:54 PM    Narrative    Test Reason : R07.9,    Vent. Rate : 060 BPM     Atrial Rate : 000 BPM     P-R Int : 138 ms          QRS Dur : 092 ms      QT Int : 436 ms       P-R-T Axes : 079 046 063 degrees     QTc Int : 436 ms    Sinus rhythm  Poor R wave progression  Borderline ECG    Confirmed by Kasey YAO, Ferny ZAMORA ([id number]) on 5/29/2023 12:50:55 PM    Referred By: KANU   SELF           Confirmed By:Ferny Parks MD         Anesthesia Plan  Type of Anesthesia, risks & benefits discussed:    Anesthesia Type: Gen Supraglottic Airway  Intra-op Monitoring Plan: Standard ASA Monitors  Post Op Pain Control Plan: multimodal analgesia  Induction:  IV  Airway Plan: Direct, Post-Induction  Informed Consent: Informed consent signed with the Patient and all parties understand the risks and agree with anesthesia plan.  All questions answered.   ASA Score: 3  Day of Surgery Review of History & Physical: H&P Update referred to the surgeon/provider.I have interviewed and examined the patient. I have reviewed the patient's H&P dated: There are no significant changes.     Ready For Surgery From Anesthesia Perspective.     .

## 2024-12-19 NOTE — PROGRESS NOTES
EGD with biopsy shows normal-appearing esophagus tissue and mild chronic gastritis.  Recommendation:  Avoid nonsteroidal anti-inflammatories, continue Protonix as needed, repeat esophageal dilation as needed.

## 2024-12-19 NOTE — BRIEF OP NOTE
[facility] - Orthopedic Periop Services  Brief Operative Note    Surgery Date: 12/19/2024     Surgeons and Role:     * Joshua Dahl MD - Primary    Assisting Surgeon: None    Pre-op Diagnosis:  Trigger finger of left thumb [M65.312]    Post-op Diagnosis:  Post-Op Diagnosis Codes:     * Trigger finger of left thumb [M65.312]    Procedure(s) (LRB):  RELEASE, TRIGGER FINGER (Left)  REPAIR, TENDON, FLEXOR (Left)    Anesthesia:  General    Description of the findings of the procedure(s): See Op Note     Estimated Blood Loss: * No values recorded between 12/19/2024  4:32 PM and 12/19/2024  4:49 PM * minimal         Specimens:   Specimen (24h ago, onward)      None              Discharge Note    OUTCOME: Patient tolerated treatment/procedure well without complication and is now ready for discharge.    DISPOSITION: Home or Self Care    FINAL DIAGNOSIS:  Trigger finger of left thumb    FOLLOWUP: In clinic    DISCHARGE INSTRUCTIONS:    Discharge Procedure Orders   Diet general     Keep surgical extremity elevated     Ice to affected area   Order Comments: using barrier between ice and skin (specify duration&frequency)     Remove dressing in 72 hours   Order Comments: Keep dressing in place for 72 hours     Change dressing (specify)   Order Comments: Dressing change: one time per day beginning 72 hours post op.     Call MD for:  temperature >100.4     Call MD for:  persistent nausea and vomiting     Call MD for:  severe uncontrolled pain     Call MD for:  difficulty breathing, headache or visual disturbances     Call MD for:  redness, tenderness, or signs of infection (pain, swelling, redness, odor or green/yellow discharge around incision site)     Call MD for:  hives     Call MD for:  persistent dizziness or light-headedness     Call MD for:  extreme fatigue     Activity as tolerated     Shower on day dressing removed (No bath)     Weight bearing as tolerated        Clinical Reference Documents Added to Patient  Instructions         Document    [facility] OPIOID AVS FACTSHEET    TRIGGER FINGER DISCHARGE INSTRUCTIONS (ENGLISH)

## 2024-12-19 NOTE — OP NOTE
[facility]  Surgery Department  Operative Note    SUMMARY     Date of Procedure: 12/19/2024     Procedure: Procedure(s) (LRB):  RELEASE, TRIGGER FINGER (Left)  REPAIR, TENDON, FLEXOR (Left)     Surgeons and Role:     * Joshua Dahl MD - Primary    Assisting Surgeon: None    Pre-Operative Diagnosis: Trigger finger of left thumb [M65.312]    Post-Operative Diagnosis: Post-Op Diagnosis Codes:     * Trigger finger of left thumb [M65.312]    Anesthesia:  General    Operative Findings (including complications, if any):  Nodular tenosynovitis, vertical split tear FPL tendon    Description of Technical Procedures:  A1 pulley release, repair FPL tendon-left thumb           ORTHOPEDIC SURGERY           OPERATIVE REPORT     NAME: Cole Herrera   MRN: [id number]      DATE OF SURGERY:  12/19/2024     PREOPERATIVE DIAGNOSIS:  Nodular tenosynovitis (trigger finger) - left thumb with FPL tendon vertical tear    POSTOPERATIVE DIAGNOSIS:    Nodular tenosynovitis (trigger finger) - left thumb Vertical split tear of FPL tendon -     ANESTHESIA: General    PROCEDURE:  A1 pulley release - left thumb with repair FPL tendon    PROCEDURE IN DETAIL:  The patient was taken to the operating room and placed in the supine position.  After an adequate level of regional trip block anesthesia had been achieved (See Anesthesia Note), the patient's left upper extremity was scrubbed with Betadine and draped in sterile fashion.  The operation was begun by making a transverse skin incision over the palmar aspect of the left thumb. The incision was carried carefully through the subcutaneous layers with care being taken to avoid injury to the adjacent neurovascular bundles.  The A1 pulley was identified and incised longitudinally with a scalpel.  The FPL tendon was delivered into the wound.  There was a vertical split tear of the FPL tendon.  The FPL tendon was repaired with 5-0 PDS.  There was an area of  nodular tenosynovitis present which was trimmed with a scissor.  Adhesions were noted within the flexor sheath and these were lysed with a scissor.  The wound was irrigated with antibiotic solution. The skin margins were infiltrated with ½% Marcaine without Epinephrine.  The skin margin was reapproximated with interrupted 5-0 Nylon suture.  The wound was dressed sterilely.  The wound was dressed sterilely and the patient was taken to the recovery room in satisfactory condition.  Estimated blood loss: less than 5 ccs. Tourniquet time: 11minutes.  The patient received Ancef antibiotic intravenously prior to the procedure.     Significant Surgical Tasks Conducted by the Assistant(s), if Applicable: None    Estimated Blood Loss (EBL): * No values recorded between 12/19/2024  4:32 PM and 12/19/2024  4:49 PM *2cc           Implants: * No implants in log *    Specimens:   Specimen (24h ago, onward)      None                    Condition: Good    Disposition: PACU - hemodynamically stable.    Attestation: Op Note Attestation: I was physically present and scrubbed for the entire procedure.

## 2024-12-19 NOTE — H&P
[facility]  Orthopedics  H&P    Patient Name: Cole Herrera  MRN: [id number]  Admission Date: (Not on file)  Primary Care Provider: Shaun Capone DO    Patient information was obtained from patient and ER records.     Subjective:     Principal Problem:Trigger finger of left thumb    Chief Complaint: No chief complaint on file.       HPI: Chief complaint:  Painful triggering and locking-left thumb  History:   Cole Herrera is a 48 y.o. female seen  for evaluation of her left thumb pain and dysfunction.  Initially developed triggering insidiously with no history of trauma.  Over the past 3 weeks she was had locking requiring manual reduction.  She was right-hand dominant.  She was not on any blood thinners.    Impression:  Nodular tenosynovitis (trigger finger)-left thumb   Plan:  A1 pulley release left thumb outpatient beer block anesthesia versus general.  Potential benefits and risks of surgery outlined to include but not limited to bleeding, infection, damage to blood vessels and nerves, need for further surgery, other risks and complications including even death the patient wished to proceed.        Past Medical History:   Diagnosis Date    Allergy     Depression     Diabetes mellitus, type 2     Epigastric pain 05/11/2021    GERD (gastroesophageal reflux disease)     Hypertension     Sickle cell anemia     trait not anemia       Past Surgical History:   Procedure Laterality Date    APPENDECTOMY      ARTHROSCOPY OF KNEE Right 9/3/2024    Procedure: ARTHROSCOPY, KNEE;  Surgeon: Joshua Dahl MD;  Location: [facility];  Service: Orthopedics;  Laterality: Right;    ARTHROSCOPY, KNEE, WITH MEDIAL OR LATERAL MENISCECTOMY Right 9/3/2024    Procedure: ARTHROSCOPY, KNEE, WITH PARTIAL MEDIAL MENISCECTOMY;  Surgeon: Joshua Dahl MD;  Location: [facility];  Service: Orthopedics;  Laterality: Right;    CARPAL TUNNEL RELEASE      CARPAL TUNNEL  RELEASE       SECTION      CHOLECYSTECTOMY      ESOPHAGOGASTRODUODENOSCOPY  2021    HYSTERECTOMY      KNEE ARTHROSCOPY W/ PLICA EXCISION Right 9/3/2024    Procedure: EXCISION, PLICA, KNEE, ARTHROSCOPIC;  Surgeon: Joshua Dahl MD;  Location: [facility];  Service: Orthopedics;  Laterality: Right;    MICROFRACTURE Right 9/3/2024    Procedure: MICROFRACTURE MEDIAL FEMORAL CONDYLE;  Surgeon: Joshua Dahl MD;  Location: [facility] OR;  Service: Orthopedics;  Laterality: Right;       Review of patient's allergies indicates:   Allergen Reactions    Phenergan plain Other (See Comments)     Pt states feeling 'funny'       No current facility-administered medications for this encounter.     Current Outpatient Medications   Medication Sig    atorvastatin (LIPITOR) 10 MG tablet Take 1 tablet (10 mg total) by mouth once daily.    citalopram (CELEXA) 20 MG tablet Take 1 tablet by mouth once daily    cyclobenzaprine (FLEXERIL) 10 MG tablet Take 10 mg by mouth 3 (three) times daily. (Patient not taking: Reported on 9/10/2024)    fluconazole (DIFLUCAN) 150 MG Tab Take 1 tablet (150 mg total) by mouth once a week. (Patient not taking: Reported on 9/10/2024)    gabapentin (NEURONTIN) 300 MG capsule Take 1 capsule (300 mg total) by mouth once daily.    insulin degludec (TRESIBA FLEXTOUCH U-100) 100 unit/mL (3 mL) InPn Inject 60 Units into the skin nightly.    meloxicam (MOBIC) 15 MG tablet Take 1 tablet (15 mg total) by mouth daily as needed for Pain.    metFORMIN (GLUCOPHAGE) 850 MG tablet Take 1 tablet (850 mg total) by mouth 2 (two) times daily with meals.    naproxen (NAPROSYN) 500 MG tablet Take 1 tablet (500 mg total) by mouth 2 (two) times daily.    NOVOLOG FLEXPEN U-100 INSULIN 100 unit/mL (3 mL) InPn pen Inject 40 Units into the skin 3 (three) times daily.    pantoprazole (PROTONIX) 40 MG tablet Take 1 tablet (40 mg total) by mouth once daily.    rOPINIRole (REQUIP) 0.25 MG tablet Take  1 tablet (0.25 mg total) by mouth every evening.    tirzepatide (MOUNJARO) 2.5 mg/0.5 mL PnIj Inject 2.5 mg into the skin every 7 days.    triamterene-hydrochlorothiazide 37.5-25 mg (MAXZIDE-25) 37.5-25 mg per tablet Take 1 tablet by mouth once daily     Family History       Problem Relation (Age of Onset)    Arthritis Mother    Cancer Father    Diabetes Mother    Glaucoma Mother    Hyperlipidemia Mother    Hypertension Mother          Tobacco Use    Smoking status: Some Days    Smokeless tobacco: Never    Tobacco comments:     Medical marijuana   Substance and Sexual Activity    Alcohol use: Never    Drug use: Yes     Types: Marijuana    Sexual activity: Yes     Partners: Male     Review of Systems   Constitutional: Negative.     Objective:     Vital Signs (Most Recent):    Vital Signs (24h Range):              There is no height or weight on file to calculate BMI.    No intake or output data in the 24 hours ending 12/19/24 1020     General    Vitals reviewed.  Constitutional: She is oriented to person, place, and time. She appears well-developed and well-nourished.   HENT:   Head: Normocephalic and atraumatic.   Eyes: EOM are normal. Pupils are equal, round, and reactive to light.   Cardiovascular:  Normal rate, regular rhythm and normal heart sounds.            Pulmonary/Chest: Effort normal and breath sounds normal.   Abdominal: Soft. Bowel sounds are normal.   Neurological: She is alert and oriented to person, place, and time.   Psychiatric: She has a normal mood and affect. Her behavior is normal. Thought content normal.             Right Hand/Wrist Exam   Right hand exam is normal.      Left Hand/Wrist Exam     Pain   Hand - The patient exhibits pain of the thumb MCP.    Other     Sensory Exam  Median Distribution: normal  Ulnar Distribution: normal  Radial Distribution: normal          Vascular Exam       Capillary Refill  Left Hand: normal capillary refill           Significant Labs: All pertinent labs  within the past 24 hours have been reviewed.    Significant Imaging: I have reviewed all pertinent imaging results/findings.  Assessment/Plan:     No notes have been filed under this hospital service.  Service: Orthopedic Surgery      Joshua Dahl MD  Orthopedics  [facility] - Orthopedic [facility]iop [facility]

## 2024-12-19 NOTE — HPI
Chief complaint:  Painful triggering and locking-left thumb  History:   Cole Herrera is a 48 y.o. female seen  for evaluation of her left thumb pain and dysfunction.  Initially developed triggering insidiously with no history of trauma.  Over the past 3 weeks she was had locking requiring manual reduction.  She was right-hand dominant.  She was not on any blood thinners.    Impression:  Nodular tenosynovitis (trigger finger)-left thumb   Plan:  A1 pulley release left thumb outpatient beer block anesthesia versus general.  Potential benefits and risks of surgery outlined to include but not limited to bleeding, infection, damage to blood vessels and nerves, need for further surgery, other risks and complications including even death the patient wished to proceed.

## 2024-12-19 NOTE — ANESTHESIA PROCEDURE NOTES
Intubation    Date/Time: 12/19/2024 4:21 PM    Performed by: Raza Daily CRNA  Authorized by: Sj Foster MD    Intubation:     Induction:  Intravenous    Intubated:  Postinduction    Mask Ventilation:  Easy mask    Attempts:  1    Attempted By:  CRNA    Difficult Airway Encountered?: No      Complications:  None    Airway Device:  Supraglottic airway/LMA    Airway Device Size:  4.0    Style/Cuff Inflation:  Cuffed (inflated to minimal occlusive pressure)    Placement Verified By:  Capnometry    Complicating Factors:  None    Findings Post-Intubation:  BS equal bilateral

## 2024-12-19 NOTE — DISCHARGE SUMMARY
[facility]  Discharge Note  Short Stay    Procedure(s) (LRB):  RELEASE, TRIGGER FINGER (Left)      OUTCOME: Patient tolerated treatment/procedure well without complication and is now ready for discharge.    DISPOSITION: Home or Self Care    FINAL DIAGNOSIS:  Trigger finger of left thumb    FOLLOWUP: In clinic    DISCHARGE INSTRUCTIONS:    Discharge Procedure Orders   Diet general     Keep surgical extremity elevated     Ice to affected area   Order Comments: using barrier between ice and skin (specify duration&frequency)     Remove dressing in 72 hours   Order Comments: Keep dressing in place for 72 hours     Change dressing (specify)   Order Comments: Dressing change: one time per day beginning 72 hours post op.     Call MD for:  temperature >100.4     Call MD for:  persistent nausea and vomiting     Call MD for:  severe uncontrolled pain     Call MD for:  difficulty breathing, headache or visual disturbances     Call MD for:  redness, tenderness, or signs of infection (pain, swelling, redness, odor or green/yellow discharge around incision site)     Call MD for:  hives     Call MD for:  persistent dizziness or light-headedness     Call MD for:  extreme fatigue     Activity as tolerated     Shower on day dressing removed (No bath)     Weight bearing as tolerated         Clinical Reference Documents Added to Patient Instructions         Document    [facility] FACTSHEET    TRIGGER FINGER DISCHARGE INSTRUCTIONS (ENGLISH)            TIME SPENT ON DISCHARGE: 15 minutes

## 2024-12-19 NOTE — OR NURSING
1654- Pt arrived in pacu. Vss, respirations even et unlabored. Pt left thumb wrapped with kerlex and ace bandage.Pt left arm in arm sling. Dressing clean, dry and intact. Pt very drowsy. No signs of pain or distress.    1715- Pt more awake. Responds approprriately to verbal cues. Vss, respirations even et unlabored. Pt denies pain at this time. Dressing on left hand clean, dry and intact.     1727- Pt ready for discharge Dressing on left hand clean, dry and intact. Denies pain at this time. Pt transported to [facility] by stretcher.    1730- Report given to Simon Maynard RN. /86, hr 73, rr 16, o2 96% on room air. Family at bedside.

## 2024-12-20 NOTE — ANESTHESIA POSTPROCEDURE EVALUATION
Anesthesia Post Evaluation    Patient: Cole Herrera    Procedure(s) Performed: Procedure(s) (LRB):  RELEASE, TRIGGER FINGER (Left)  REPAIR, TENDON, FLEXOR (Left)    Final Anesthesia Type: general      Patient location during evaluation: PACU  Post-procedure vital signs: reviewed and stable  Pain management: adequate  Airway patency: patent    PONV status at discharge: No PONV  Anesthetic complications: no      Cardiovascular status: hemodynamically stable  Respiratory status: unassisted  Hydration status: euvolemic  Follow-up not needed.              Vitals Value Taken Time   /86 12/19/24 1732   Temp 36.4 °C (97.6 °F) 12/19/24 1658   Pulse 68 12/19/24 1807   Resp 16 12/19/24 1800   SpO2 99 % 12/19/24 1807   Vitals shown include unfiled device data.      Event Time   Out of Recovery 17:27:00         Pain/Juan Alberto Score: Juan Alberto Score: 10 (12/19/2024  6:20 PM)  Modified Juan Alberto Score: 20 (12/19/2024  6:20 PM)

## 2024-12-22 LAB
OHS QRS DURATION: 90 MS
OHS QTC CALCULATION: 397 MS

## 2024-12-30 ENCOUNTER — OFFICE VISIT (OUTPATIENT)
Dept: ORTHOPEDICS | Facility: CLINIC | Age: 49
End: 2024-12-30
Payer: COMMERCIAL

## 2024-12-30 DIAGNOSIS — Z09 POSTOP CHECK: Primary | ICD-10-CM

## 2024-12-30 PROCEDURE — 1160F RVW MEDS BY RX/DR IN RCRD: CPT | Mod: S$GLB,,, | Performed by: ORTHOPAEDIC SURGERY

## 2024-12-30 PROCEDURE — 99999 PR PBB SHADOW E&M-EST. PATIENT-LVL III: CPT | Mod: PBBFAC,,, | Performed by: ORTHOPAEDIC SURGERY

## 2024-12-30 PROCEDURE — 3046F HEMOGLOBIN A1C LEVEL >9.0%: CPT | Mod: S$GLB,,, | Performed by: ORTHOPAEDIC SURGERY

## 2024-12-30 PROCEDURE — 3061F NEG MICROALBUMINURIA REV: CPT | Mod: S$GLB,,, | Performed by: ORTHOPAEDIC SURGERY

## 2024-12-30 PROCEDURE — 99024 POSTOP FOLLOW-UP VISIT: CPT | Mod: S$GLB,,, | Performed by: ORTHOPAEDIC SURGERY

## 2024-12-30 PROCEDURE — 1159F MED LIST DOCD IN RCRD: CPT | Mod: S$GLB,,, | Performed by: ORTHOPAEDIC SURGERY

## 2024-12-30 PROCEDURE — 3066F NEPHROPATHY DOC TX: CPT | Mod: S$GLB,,, | Performed by: ORTHOPAEDIC SURGERY

## 2024-12-30 NOTE — PROGRESS NOTES
CLINIC NOTE       Chief Complaint   Patient presents with    Left Hand - Post-op Evaluation        Cole Herrera is a 49 y.o. female seen today for recheck of her left thumb.  She is now 11 days following A1 pulley release of the left thumb for nodular tenosynovitis.  She is doing well at this time.  She is regaining excellent motion without additional triggering or locking.  Incisions healing well without signs of infection.  Sutures removed Steri-Strips were applied.  Slow progression of activities outlined.  Recheck p.r.n.      Past Medical History:   Diagnosis Date    Allergy     Depression     Diabetes mellitus, type 2     Epigastric pain 05/11/2021    GERD (gastroesophageal reflux disease)     Hypertension     Sickle cell anemia     trait not anemia     Family History   Problem Relation Name Age of Onset    Hypertension Mother Choudrant Laphand     Diabetes Mother Claudette Laphand     Hyperlipidemia Mother Claudette Laphand     Glaucoma Mother Choudrant Laphand     Arthritis Mother Choudrant Laphand     Cancer Father Jj Davis      Current Outpatient Medications on File Prior to Visit   Medication Sig Dispense Refill    atorvastatin (LIPITOR) 10 MG tablet Take 1 tablet (10 mg total) by mouth once daily. 90 tablet 3    citalopram (CELEXA) 20 MG tablet Take 1 tablet by mouth once daily 90 tablet 3    gabapentin (NEURONTIN) 300 MG capsule Take 1 capsule (300 mg total) by mouth once daily. 90 capsule 3    insulin degludec (TRESIBA FLEXTOUCH U-100) 100 unit/mL (3 mL) InPn Inject 60 Units into the skin nightly.      meloxicam (MOBIC) 15 MG tablet Take 1 tablet (15 mg total) by mouth daily as needed for Pain. 30 tablet 2    metFORMIN (GLUCOPHAGE) 850 MG tablet Take 1 tablet (850 mg total) by mouth 2 (two) times daily with meals. 180 tablet 1    naproxen (NAPROSYN) 500 MG tablet Take 1 tablet (500 mg total) by mouth 2 (two) times daily. 30 tablet 0    NOVOLOG FLEXPEN U-100 INSULIN 100 unit/mL (3 mL) InPn pen Inject 40  Units into the skin 3 (three) times daily.      pantoprazole (PROTONIX) 40 MG tablet Take 1 tablet (40 mg total) by mouth once daily. 90 tablet 3    rOPINIRole (REQUIP) 0.25 MG tablet Take 1 tablet (0.25 mg total) by mouth every evening. 30 tablet 3    tirzepatide (MOUNJARO) 2.5 mg/0.5 mL PnIj Inject 2.5 mg into the skin every 7 days. 12 Pen 3    triamterene-hydrochlorothiazide 37.5-25 mg (MAXZIDE-25) 37.5-25 mg per tablet Take 1 tablet by mouth once daily 90 tablet 3     No current facility-administered medications on file prior to visit.       ROS     There were no vitals filed for this visit.    Past Surgical History:   Procedure Laterality Date    APPENDECTOMY      ARTHROSCOPY OF KNEE Right 9/3/2024    Procedure: ARTHROSCOPY, KNEE;  Surgeon: Joshua Dahl MD;  Location: [facility];  Service: Orthopedics;  Laterality: Right;    ARTHROSCOPY, KNEE, WITH MEDIAL OR LATERAL MENISCECTOMY Right 9/3/2024    Procedure: ARTHROSCOPY, KNEE, WITH PARTIAL MEDIAL MENISCECTOMY;  Surgeon: Joshua Dahl MD;  Location: [facility] OR;  Service: Orthopedics;  Laterality: Right;    CARPAL TUNNEL RELEASE      CARPAL TUNNEL RELEASE       SECTION      CHOLECYSTECTOMY      ESOPHAGOGASTRODUODENOSCOPY  2021    FLEXOR TENDON REPAIR Left 2024    Procedure: REPAIR, TENDON, FLEXOR;  Surgeon: Joshua Dahl MD;  Location: [facility] OR;  Service: Orthopedics;  Laterality: Left;    HYSTERECTOMY      KNEE ARTHROSCOPY W/ PLICA EXCISION Right 9/3/2024    Procedure: EXCISION, PLICA, KNEE, ARTHROSCOPIC;  Surgeon: Joshua Dahl MD;  Location: [facility] OR;  Service: Orthopedics;  Laterality: Right;    MICROFRACTURE Right 9/3/2024    Procedure: MICROFRACTURE MEDIAL FEMORAL CONDYLE;  Surgeon: Joshua Dahl MD;  Location: [facility] OR;  Service: Orthopedics;  Laterality: Right;    TRIGGER FINGER RELEASE Left 2024    Procedure: RELEASE, TRIGGER FINGER;  Surgeon:  Joshua Dahl MD;  [facility];  Service: Orthopedics;  Laterality: Left;        Review of patient's allergies indicates:   Allergen Reactions    Phenergan plain Other (See Comments)     Pt states feeling 'funny'        Ortho Exam     Radiographic Examination:    Technique:    Findings:    Impression:   See Above    Assessment and Plan  Patient Active Problem List    Diagnosis Date Noted    Trigger finger of left thumb 12/19/2024    Hyperglycemia 12/18/2024    Acute medial meniscus tear of right knee 06/17/2024    Acute pain of right shoulder 01/19/2023    Sickle cell trait 01/19/2023    Fatigue 01/19/2023    Acute superficial gastritis without hemorrhage     Constipation 07/06/2021    ESTES (nonalcoholic steatohepatitis) 06/10/2021    Restless legs 06/10/2021    Elevated liver enzymes 05/17/2021    Fatty liver 05/17/2021    Epigastric pain 05/11/2021    Esophageal dysphagia 05/11/2021    Type 2 diabetes mellitus without complication, without long-term current use of insulin 03/26/2021    Essential hypertension 03/26/2021    Gastroesophageal reflux disease 03/26/2021    Hyperlipidemia 03/26/2021    Obesity 03/26/2021    Anxiety 03/26/2021    Depression 03/26/2021          Joshua Dahl M.D.

## 2025-01-07 PROBLEM — S83.241A ACUTE MEDIAL MENISCUS TEAR OF RIGHT KNEE: Status: RESOLVED | Noted: 2024-06-17 | Resolved: 2025-01-07

## 2025-03-31 RX ORDER — TRIAMTERENE/HYDROCHLOROTHIAZID 37.5-25 MG
1 TABLET ORAL DAILY
Qty: 90 TABLET | Refills: 0 | Status: SHIPPED | OUTPATIENT
Start: 2025-03-31

## 2025-04-22 ENCOUNTER — HOSPITAL ENCOUNTER (OUTPATIENT)
Dept: RADIOLOGY | Facility: HOSPITAL | Age: 50
Discharge: HOME | End: 2025-04-22
Attending: NURSE PRACTITIONER
Payer: COMMERCIAL

## 2025-04-22 ENCOUNTER — OFFICE VISIT (OUTPATIENT)
Dept: ORTHOPEDICS | Facility: CLINIC | Age: 50
End: 2025-04-22
Payer: COMMERCIAL

## 2025-04-22 VITALS
BODY MASS INDEX: 29.55 KG/M2 | DIASTOLIC BLOOD PRESSURE: 81 MMHG | HEART RATE: 71 BPM | RESPIRATION RATE: 18 BRPM | WEIGHT: 195 LBS | OXYGEN SATURATION: 100 % | SYSTOLIC BLOOD PRESSURE: 123 MMHG | HEIGHT: 68 IN

## 2025-04-22 DIAGNOSIS — M17.11 PRIMARY OSTEOARTHRITIS OF RIGHT KNEE: ICD-10-CM

## 2025-04-22 DIAGNOSIS — M17.11 PRIMARY OSTEOARTHRITIS OF RIGHT KNEE: Primary | ICD-10-CM

## 2025-04-22 PROCEDURE — 3008F BODY MASS INDEX DOCD: CPT | Mod: S$GLB,,, | Performed by: NURSE PRACTITIONER

## 2025-04-22 PROCEDURE — 20610 DRAIN/INJ JOINT/BURSA W/O US: CPT | Mod: RT,S$GLB,, | Performed by: NURSE PRACTITIONER

## 2025-04-22 PROCEDURE — 3079F DIAST BP 80-89 MM HG: CPT | Mod: S$GLB,,, | Performed by: NURSE PRACTITIONER

## 2025-04-22 PROCEDURE — 3074F SYST BP LT 130 MM HG: CPT | Mod: S$GLB,,, | Performed by: NURSE PRACTITIONER

## 2025-04-22 PROCEDURE — 99999 PR PBB SHADOW E&M-EST. PATIENT-LVL IV: CPT | Mod: PBBFAC,,, | Performed by: NURSE PRACTITIONER

## 2025-04-22 PROCEDURE — 1159F MED LIST DOCD IN RCRD: CPT | Mod: S$GLB,,, | Performed by: NURSE PRACTITIONER

## 2025-04-22 PROCEDURE — 73564 X-RAY EXAM KNEE 4 OR MORE: CPT | Mod: TC,RT

## 2025-04-22 PROCEDURE — 99213 OFFICE O/P EST LOW 20 MIN: CPT | Mod: 25,S$GLB,, | Performed by: NURSE PRACTITIONER

## 2025-04-22 RX ORDER — MELOXICAM 7.5 MG/1
7.5 TABLET ORAL DAILY
Qty: 30 TABLET | Refills: 2 | Status: SHIPPED | OUTPATIENT
Start: 2025-04-22

## 2025-04-22 RX ADMIN — TRIAMCINOLONE ACETONIDE 40 MG: 40 INJECTION, SUSPENSION INTRA-ARTICULAR; INTRAMUSCULAR at 09:04

## 2025-04-22 NOTE — PROGRESS NOTES
CC:  Knee pain    49 y.o. Female returns to clinic for a follow up visit regarding knee pain.       Patient is 7 1/2 months post right knee arthroscopy by Dr. Dahl.   She continues to have some knee pain and has discuss injections with Dr. Dahl in the past.   Was given an injection at her last visit on 10/02/2024.   She reports that injection provided good relief until the last couple of weeks.     She is here today for a repeat injection in her right knee.        Past Medical History:   Diagnosis Date    Allergy     Depression     Diabetes mellitus, type 2     Epigastric pain 2021    GERD (gastroesophageal reflux disease)     Hypertension     Sickle cell anemia     trait not anemia     Past Surgical History:   Procedure Laterality Date    APPENDECTOMY      ARTHROSCOPY OF KNEE Right 9/3/2024    Procedure: ARTHROSCOPY, KNEE;  Surgeon: Joshua Dahl MD;  Location: [facility];  Service: Orthopedics;  Laterality: Right;    ARTHROSCOPY, KNEE, WITH MEDIAL OR LATERAL MENISCECTOMY Right 9/3/2024    Procedure: ARTHROSCOPY, KNEE, WITH PARTIAL MEDIAL MENISCECTOMY;  Surgeon: Joshua Dahl MD;  Location: [facility];  Service: Orthopedics;  Laterality: Right;    CARPAL TUNNEL RELEASE      CARPAL TUNNEL RELEASE       SECTION      CHOLECYSTECTOMY      ESOPHAGOGASTRODUODENOSCOPY  2021    FLEXOR TENDON REPAIR Left 2024    Procedure: REPAIR, TENDON, FLEXOR;  Surgeon: Joshua Dahl MD;  Location: [facility] OR;  Service: Orthopedics;  Laterality: Left;    HYSTERECTOMY      KNEE ARTHROSCOPY W/ PLICA EXCISION Right 9/3/2024    Procedure: EXCISION, PLICA, KNEE, ARTHROSCOPIC;  Surgeon: Joshua Dahl MD;  Location: [facility] OR;  Service: Orthopedics;  Laterality: Right;    MICROFRACTURE Right 9/3/2024    Procedure: MICROFRACTURE MEDIAL FEMORAL CONDYLE;  Surgeon: Joshua Dahl MD;  Location: [facility];  Service: Orthopedics;   "Laterality: Right;    TRIGGER FINGER RELEASE Left 12/19/2024    Procedure: RELEASE, TRIGGER FINGER;  Surgeon: Joshua Dahl MD;  [facility];  Service: Orthopedics;  Laterality: Left;         PHYSICAL EXAMINATION:  /81 (BP Location: Left arm, Patient Position: Sitting)   Pulse 71   Resp 18   Ht 5' 8" (1.727 m)   Wt 88.5 kg (195 lb)   SpO2 100%   BMI 29.65 kg/m²             Right Knee Exam     Inspection   Swelling: absent  Bruising: absent    Tenderness   The patient is tender to palpation of the medial joint line and lateral joint line.    Range of Motion   Extension:  normal   Flexion:  normal     Tests   Meniscus   Garry:  Medial - positive     Other   Sensation: normal    Vascular Exam     Right Pulses  Dorsalis Pedis:      2+            IMAGING:  X-Ray Knee Complete 4 Or More Views Right  Result Date: 4/29/2025  EXAMINATION: XR KNEE COMP 4 OR MORE VIEWS RIGHT CLINICAL HISTORY: Unilateral primary osteoarthritis, right knee TECHNIQUE: AP, AP flexion and lateral view and patellar view COMPARISON: 05/19/2024 FINDINGS: Mild medial knee compartment joint space narrowing and osteophyte formation.  No sclerosis or cystic geodes. No fracture, no osseous lesions. The femoral patellar joint space is mildly narrowed with small osteophyte.  No joint effusion. The soft tissues appear normal.     Mild degenerative change at the medial knee joint. Electronically signed by: Keena Nunez MD Date:    04/29/2025 Time:    10:39       ASSESSMENT:      ICD-10-CM ICD-9-CM   1. Primary osteoarthritis of right knee  M17.11 715.16       PLAN:     -Findings and treatment options were discussed with the patient  -All questions answered  Natural history and expected course discussed. Questions answered.  Rest, ice, compression, and elevation (RICE) therapy.  Mobic 7.5 mg p.o. daily.  Right knee injection today.  Return to clinic with Dr. Premier rouse in 4 months for recheck    There are no " Patient Instructions on file for this visit.      Orders Placed This Encounter   Procedures    Large Joint Aspiration/Injection: R supra patellar bursa    X-Ray Knee Complete 4 Or More Views Right         Large Joint Aspiration/Injection: R supra patellar bursa    Date/Time: 4/22/2025 9:40 AM    Performed by: Stephany Peralta FNP  Authorized by: Stephany Peralta FNP    Consent Done?:  Yes (Verbal)  Indications:  Pain  Site marked: the procedure site was marked    Local anesthetic:  Bupivacaine 0.25% without epinephrine    Details:  Needle Size:  22 G  Location:  Knee  Site:  R supra patellar bursa  Medications:  40 mg triamcinolone acetonide 40 mg/mL  Patient tolerance:  Patient tolerated the procedure well with no immediate complications

## 2025-04-22 NOTE — LETTER
April 22, 2025      [facility] - Orth[facility]  [address] [place] [address]  Phone: [phone]  Fax: [phone]       Patient: Cole Herrera   YOB: 1975  Date of Visit: 04/22/2025    To Whom It May Concern:    Wilfredo Herrera  was at [facility] on 04/22/2025. The patient may return to work/school on 04/22/2025 with no restrictions. If you have any questions or concerns, or if I can be of further assistance, please do not hesitate to contact me.    Sincerely,    LACEY Catalan

## 2025-04-30 RX ORDER — TRIAMCINOLONE ACETONIDE 40 MG/ML
40 INJECTION, SUSPENSION INTRA-ARTICULAR; INTRAMUSCULAR
Status: DISCONTINUED | OUTPATIENT
Start: 2025-04-22 | End: 2025-04-30 | Stop reason: HOSPADM

## 2025-05-12 RX ORDER — ROPINIROLE 0.25 MG/1
0.25 TABLET, FILM COATED ORAL NIGHTLY
Qty: 30 TABLET | Refills: 0 | Status: SHIPPED | OUTPATIENT
Start: 2025-05-12 | End: 2025-06-11

## 2025-05-12 RX ORDER — CITALOPRAM 20 MG/1
20 TABLET ORAL DAILY
Qty: 90 TABLET | Refills: 0 | Status: SHIPPED | OUTPATIENT
Start: 2025-05-12

## 2025-05-12 NOTE — TELEPHONE ENCOUNTER
Spoke to pt and let her know we will send in. Pt has not been seen since 2023 and appt has been moved up from July. Pt thanked me

## 2025-05-12 NOTE — TELEPHONE ENCOUNTER
Copied from CRM #[id number]. Topic: Medications - Medication Refill  >> May 12, 2025  9:45 AM Jovana wrote:  Who Called: Cole Herrera    Refill or New Rx:Refill  citalopram (CELEXA) 20 MG tablet 90 tablet 3 12/18/2023 -   Sig: Take 1 tablet by mouth once daily   rOPINIRole (REQUIP) 0.25 MG tablet 30 tablet 3 6/10/2021 12/19/2024   Sig - Route: Take 1 tablet (0.25 mg total) by mouth every evening. - Oral     List of preferred pharmacies on file (remove unneeded): [facility] [address] [place] [address], [place] [address] [place] [address]  [address] [place] [address] [place] [address]  Phone: [phone] Fax: [phone]    Preferred Method of Contact: Phone Call  Patient's Preferred Phone Number on File: [phone]   Best Call Back Number, if different:  Additional Information:

## 2025-05-22 ENCOUNTER — OFFICE VISIT (OUTPATIENT)
Dept: FAMILY MEDICINE | Facility: CLINIC | Age: 50
End: 2025-05-22
Payer: COMMERCIAL

## 2025-05-22 VITALS
OXYGEN SATURATION: 99 % | WEIGHT: 198 LBS | HEIGHT: 68 IN | TEMPERATURE: 97 F | SYSTOLIC BLOOD PRESSURE: 108 MMHG | DIASTOLIC BLOOD PRESSURE: 72 MMHG | HEART RATE: 102 BPM | BODY MASS INDEX: 30.01 KG/M2

## 2025-05-22 DIAGNOSIS — K75.81 NASH (NONALCOHOLIC STEATOHEPATITIS): ICD-10-CM

## 2025-05-22 DIAGNOSIS — G25.81 RESTLESS LEGS: ICD-10-CM

## 2025-05-22 DIAGNOSIS — K21.9 GASTROESOPHAGEAL REFLUX DISEASE, UNSPECIFIED WHETHER ESOPHAGITIS PRESENT: ICD-10-CM

## 2025-05-22 DIAGNOSIS — D57.3 SICKLE CELL TRAIT: ICD-10-CM

## 2025-05-22 DIAGNOSIS — E78.5 HYPERLIPIDEMIA, UNSPECIFIED HYPERLIPIDEMIA TYPE: ICD-10-CM

## 2025-05-22 DIAGNOSIS — I10 ESSENTIAL HYPERTENSION: ICD-10-CM

## 2025-05-22 DIAGNOSIS — E11.9 TYPE 2 DIABETES MELLITUS WITHOUT COMPLICATION, WITHOUT LONG-TERM CURRENT USE OF INSULIN: ICD-10-CM

## 2025-05-22 DIAGNOSIS — Z09 FOLLOW-UP EXAM: Primary | ICD-10-CM

## 2025-05-22 LAB
CHOLEST SERPL-MCNC: 170 MG/DL
CHOLEST/HDLC SERPL: 5.5 {RATIO}
CREAT UR-MCNC: 64 MG/DL (ref 15–325)
EST. AVERAGE GLUCOSE BLD GHB EST-MCNC: 318 MG/DL
HBA1C MFR BLD HPLC: 12.7 %
HDLC SERPL-MCNC: 31 MG/DL (ref 35–60)
LDLC SERPL CALC-MCNC: 92 MG/DL
LDLC/HDLC SERPL: 3 {RATIO}
MICROALBUMIN UR-MCNC: <0.5 MG/DL
MICROALBUMIN/CREAT RATIO PNL UR: NORMAL
NONHDLC SERPL-MCNC: 139 MG/DL
TRIGL SERPL-MCNC: 236 MG/DL (ref 37–140)
VLDLC SERPL-MCNC: 47 MG/DL

## 2025-05-22 PROCEDURE — 82043 UR ALBUMIN QUANTITATIVE: CPT | Mod: ,,, | Performed by: CLINICAL MEDICAL LABORATORY

## 2025-05-22 PROCEDURE — 82570 ASSAY OF URINE CREATININE: CPT | Mod: ,,, | Performed by: CLINICAL MEDICAL LABORATORY

## 2025-05-22 PROCEDURE — 80061 LIPID PANEL: CPT | Mod: ,,, | Performed by: CLINICAL MEDICAL LABORATORY

## 2025-05-22 PROCEDURE — 83036 HEMOGLOBIN GLYCOSYLATED A1C: CPT | Mod: ,,, | Performed by: CLINICAL MEDICAL LABORATORY

## 2025-05-22 NOTE — PROGRESS NOTES
"Subjective:       Patient ID: Cole Herrera is a 49 y.o. female.    Chief Complaint: Follow-up (F/u) and Health Maintenance (Diabetic Eye Exam Never done/Pneumococcal Vaccines (Age 0-49)(1 of 2 - PCV) Never done/Low Dose Statin Never done/TETANUS VACCINE due on 10/05/2005/Hemoglobin A1c due on 07/04/2024/Foot Exam due on 09/14/2024/Diabetes Urine Screening due on 04/04/2025/Lipid Panel due on 04/04/2025/Mammogram due on 05/20/2025/)    The patient is a 49-year-old  female the presents today for routine follow-up.  We have not seen her since 2023.  She has a history of hypertension, diabetes mellitus type 2, GERD, neuropathy, and non alcoholic fatty liver disease.  She states her blood sugars have been "up and down".  She is taking her medications as directed.  Blood pressure today is 108/72.  She has an eye exam scheduled for July of this year.  Mammogram scheduled for May 29th.  She has lost some weight since we last saw her.  She is resting comfortably today in no distress.  She is afebrile and vital signs are stable.        Leg Pain   Associated symptoms include numbness.   Follow-up  Associated symptoms include arthralgias and numbness. Pertinent negatives include no abdominal pain, chest pain, chills, congestion, coughing, fatigue, fever, headaches, joint swelling, myalgias, nausea, neck pain, rash, sore throat, vomiting or weakness.   Fatigue  Associated symptoms include arthralgias and numbness. Pertinent negatives include no abdominal pain, chest pain, chills, congestion, coughing, fatigue, fever, headaches, joint swelling, myalgias, nausea, neck pain, rash, sore throat, vomiting or weakness.   Nausea  Associated symptoms include arthralgias and numbness. Pertinent negatives include no abdominal pain, chest pain, chills, congestion, coughing, fatigue, fever, headaches, joint swelling, myalgias, nausea, neck pain, rash, sore throat, vomiting or weakness.     Review of Systems "   Constitutional:  Negative for activity change, appetite change, chills, fatigue, fever and unexpected weight change.   HENT:  Negative for nasal congestion, ear pain, hearing loss, rhinorrhea, sinus pressure/congestion, sore throat and trouble swallowing.    Eyes:  Negative for pain, discharge, redness and visual disturbance.   Respiratory:  Negative for apnea, cough, chest tightness, shortness of breath and wheezing.    Cardiovascular:  Negative for chest pain and palpitations.   Gastrointestinal:  Negative for abdominal pain, blood in stool, constipation, diarrhea, nausea and vomiting.   Endocrine: Negative for cold intolerance, heat intolerance, polydipsia and polyuria.   Genitourinary:  Negative for difficulty urinating, dysuria, hematuria and menstrual problem.   Musculoskeletal:  Positive for arthralgias. Negative for back pain, joint swelling, leg pain, myalgias and neck pain.   Integumentary:  Negative for pallor, rash and wound.   Allergic/Immunologic: Negative for immunocompromised state.   Neurological:  Positive for numbness. Negative for tremors, seizures, weakness, headaches and memory loss.        Paresthesias   Hematological:  Negative for adenopathy.   Psychiatric/Behavioral:  Negative for confusion, dysphoric mood and sleep disturbance. The patient is not nervous/anxious.          Objective:      Physical Exam  Vitals and nursing note reviewed.   Constitutional:       General: She is not in acute distress.     Appearance: Normal appearance. She is obese. She is not ill-appearing.   HENT:      Head: Normocephalic and atraumatic.      Right Ear: External ear normal.      Left Ear: External ear normal.      Nose: Nose normal.      Mouth/Throat:      Pharynx: Oropharynx is clear.   Eyes:      Extraocular Movements: Extraocular movements intact.      Conjunctiva/sclera: Conjunctivae normal.      Pupils: Pupils are equal, round, and reactive to light.   Neck:      Vascular: No carotid bruit.    Cardiovascular:      Rate and Rhythm: Normal rate and regular rhythm.      Pulses: Normal pulses.      Heart sounds: Normal heart sounds. No murmur heard.  Pulmonary:      Effort: No respiratory distress.      Breath sounds: Normal breath sounds. No wheezing or rales.   Abdominal:      General: Bowel sounds are normal.      Palpations: Abdomen is soft.   Musculoskeletal:         General: No tenderness. Normal range of motion.      Cervical back: Normal range of motion and neck supple.      Right lower leg: No edema.      Left lower leg: No edema.   Skin:     General: Skin is warm and dry.      Capillary Refill: Capillary refill takes less than 2 seconds.      Coloration: Skin is not jaundiced or pale.   Neurological:      General: No focal deficit present.      Mental Status: She is alert and oriented to person, place, and time.      Cranial Nerves: No cranial nerve deficit.      Sensory: Sensory deficit present.      Motor: No weakness.      Comments: Good strength against gravity and resistance in all muscle groups  She is decreased sensation to light touch below-the-knee on the right side   Psychiatric:         Mood and Affect: Mood normal.         Judgment: Judgment normal.         Assessment:       1. Follow-up exam    2. Type 2 diabetes mellitus without complication, without long-term current use of insulin    3. Restless legs    4. Essential hypertension    5. Hyperlipidemia, unspecified hyperlipidemia type    6. Sickle cell trait    7. Gastroesophageal reflux disease, unspecified whether esophagitis present    8. ESTES (nonalcoholic steatohepatitis)        Plan:       1. Patient is here  for follow-up.  It has been awhile since we last saw her.  We do need to check some lab work.  She has a mammogram scheduled for May 29th.  She has an eye exam scheduled for July.    2. Essential hypertension-blood pressure looks good.  It is 108/72.  Continue with current care    3. Diabetes mellitus type 2----poorly  controlled historically-----she has had issues with compliance in the past.  It has been awhile since we have seen her.  She is supposed to be on NovoLog, Tresiba, Mounjaro and metformin.  She is not taking the metformin.  We are going to check an A1c today.  Also going to check a urine microalbumin.  She has an eye exam scheduled for July 2025    4. GERD-stable.  Continue with PPI    5. Restless legs-recently had iron studies which were within normal limits.  Continue with the Requip.  She is on low-dose.  If needed we can increase it    6.  non alcoholic fatty liver disease-she has been seen by Dr. Ferrera. Ultrasound scheduled for tomorrow.  All other testing has been within normal limits.  She is on appropriate therapy with the Ozempic.  She is losing weight.  She is down from about 250 lb to 236. She abstains from heavy alcohol use.  We are going to check a CMP today  9/14/23-weight is now down to 209 lb.  She had some labs done in May which we have reviewed.  She has an mildly elevated ALT but otherwise everything looks okay  5/22/25-we are checking a CMP today    7. Neuropathy-related to uncontrolled blood sugars.  We are going to check an A1c today.  Continue the gabapentin as needed    8. History of sickle cell trait-she thinks that this is accurate.  We are going to check a hemoglobin electrophoresis-consistent with sickle cell trait    Billing based on moderate medical complexity    Return to care in 6 months

## 2025-05-23 ENCOUNTER — RESULTS FOLLOW-UP (OUTPATIENT)
Dept: INTERNAL MEDICINE | Facility: CLINIC | Age: 50
End: 2025-05-23

## 2025-05-27 DIAGNOSIS — E11.65 INADEQUATELY CONTROLLED DIABETES MELLITUS: Primary | ICD-10-CM

## 2025-05-27 RX ORDER — TIRZEPATIDE 5 MG/.5ML
5 INJECTION, SOLUTION SUBCUTANEOUS
Qty: 2 ML | Refills: 11 | Status: SHIPPED | OUTPATIENT
Start: 2025-05-27 | End: 2026-05-27

## 2025-05-27 RX ORDER — ATORVASTATIN CALCIUM 10 MG/1
10 TABLET, FILM COATED ORAL DAILY
Qty: 90 TABLET | Refills: 3 | Status: SHIPPED | OUTPATIENT
Start: 2025-05-27 | End: 2026-05-27

## 2025-05-27 NOTE — TELEPHONE ENCOUNTER
----- Message from NIKHIL Drew sent at 2025  2:26 PM CDT -----  Regarding: statin rx   This pt is on the statin noncompliant list due to statin rx has  and needs renewing. No end date is recommended but at your discretion/Thanks/tp

## 2025-06-04 ENCOUNTER — TELEPHONE (OUTPATIENT)
Dept: ORTHOPEDICS | Facility: CLINIC | Age: 50
End: 2025-06-04
Payer: COMMERCIAL

## 2025-06-04 DIAGNOSIS — Z98.890 STATUS POST ARTHROSCOPY OF RIGHT KNEE: Primary | ICD-10-CM

## 2025-06-25 RX ORDER — TRIAMTERENE AND HYDROCHLOROTHIAZIDE 37.5; 25 MG/1; MG/1
1 TABLET ORAL DAILY
Qty: 90 TABLET | Refills: 0 | Status: SHIPPED | OUTPATIENT
Start: 2025-06-25

## 2025-06-27 ENCOUNTER — CLINICAL SUPPORT (OUTPATIENT)
Dept: REHABILITATION | Facility: HOSPITAL | Age: 50
End: 2025-06-27
Attending: ORTHOPAEDIC SURGERY
Payer: OTHER MISCELLANEOUS

## 2025-06-27 DIAGNOSIS — Z98.890 STATUS POST ARTHROSCOPY OF RIGHT KNEE: ICD-10-CM

## 2025-06-27 PROCEDURE — 97750 PHYSICAL PERFORMANCE TEST: CPT

## 2025-06-27 NOTE — PROGRESS NOTES
Client has completed FCE. Please see media for scanned FCE evaluation.   Thank you,   Clara TONGT, MTC

## 2025-07-22 ENCOUNTER — HOSPITAL ENCOUNTER (EMERGENCY)
Facility: HOSPITAL | Age: 50
Discharge: HOME | End: 2025-07-23
Attending: EMERGENCY MEDICINE
Payer: COMMERCIAL

## 2025-07-22 DIAGNOSIS — M54.31 SCIATICA OF RIGHT SIDE: Primary | ICD-10-CM

## 2025-07-22 PROCEDURE — 99284 EMERGENCY DEPT VISIT MOD MDM: CPT

## 2025-07-23 ENCOUNTER — TELEPHONE (OUTPATIENT)
Dept: FAMILY MEDICINE | Facility: CLINIC | Age: 50
End: 2025-07-23
Payer: COMMERCIAL

## 2025-07-23 VITALS
BODY MASS INDEX: 29.7 KG/M2 | OXYGEN SATURATION: 99 % | HEIGHT: 68 IN | HEART RATE: 81 BPM | SYSTOLIC BLOOD PRESSURE: 136 MMHG | RESPIRATION RATE: 20 BRPM | TEMPERATURE: 98 F | WEIGHT: 196 LBS | DIASTOLIC BLOOD PRESSURE: 89 MMHG

## 2025-07-23 PROCEDURE — 25000003 PHARM REV CODE 250: Performed by: EMERGENCY MEDICINE

## 2025-07-23 PROCEDURE — 96372 THER/PROPH/DIAG INJ SC/IM: CPT | Performed by: EMERGENCY MEDICINE

## 2025-07-23 PROCEDURE — 63600175 PHARM REV CODE 636 W HCPCS: Performed by: EMERGENCY MEDICINE

## 2025-07-23 RX ORDER — ORPHENADRINE CITRATE 100 MG/1
100 TABLET, EXTENDED RELEASE ORAL 2 TIMES DAILY
Qty: 20 TABLET | Refills: 0 | Status: SHIPPED | OUTPATIENT
Start: 2025-07-23 | End: 2025-07-28

## 2025-07-23 RX ORDER — HYDROCODONE BITARTRATE AND ACETAMINOPHEN 5; 325 MG/1; MG/1
1 TABLET ORAL EVERY 8 HOURS PRN
Qty: 12 TABLET | Refills: 0 | Status: SHIPPED | OUTPATIENT
Start: 2025-07-23

## 2025-07-23 RX ORDER — HYDROCODONE BITARTRATE AND ACETAMINOPHEN 5; 325 MG/1; MG/1
1 TABLET ORAL
Refills: 0 | Status: COMPLETED | OUTPATIENT
Start: 2025-07-23 | End: 2025-07-23

## 2025-07-23 RX ORDER — LEVETIRACETAM 500 MG/1
1000 TABLET ORAL
Status: DISCONTINUED | OUTPATIENT
Start: 2025-07-23 | End: 2025-07-23

## 2025-07-23 RX ORDER — ORPHENADRINE CITRATE 30 MG/ML
60 INJECTION INTRAMUSCULAR; INTRAVENOUS
Status: COMPLETED | OUTPATIENT
Start: 2025-07-23 | End: 2025-07-23

## 2025-07-23 RX ORDER — PREDNISONE 20 MG/1
20 TABLET ORAL 2 TIMES DAILY
Qty: 10 TABLET | Refills: 0 | Status: SHIPPED | OUTPATIENT
Start: 2025-07-23 | End: 2025-07-28

## 2025-07-23 RX ADMIN — HYDROCODONE BITARTRATE AND ACETAMINOPHEN 1 TABLET: 5; 325 TABLET ORAL at 12:07

## 2025-07-23 RX ADMIN — ORPHENADRINE CITRATE 60 MG: 30 INJECTION, SOLUTION INTRAMUSCULAR; INTRAVENOUS at 12:07

## 2025-07-23 NOTE — TELEPHONE ENCOUNTER
Spoke with pt and got her scheduled for the NP avinash pereira and let her know that she could give us a call if needed

## 2025-07-23 NOTE — TELEPHONE ENCOUNTER
Copied from CRM #[id number]. Topic: General Inquiry - Return Call  >> Jul 23, 2025  3:34 PM Larisa wrote:  Who Called: Cole Herrera    Patient is returning phone call    Who Left Message for Patient: Yuly Sosa CMA  Does the patient know what this is regarding?: returning call about sooner appt      Preferred Method of Contact: Phone Call  Patient's Preferred Phone Number on File: [phone]

## 2025-07-23 NOTE — ED PROVIDER NOTES
Encounter Date: 2025    SCRIBE #1 NOTE: I, Zehra Aguirre, am scribing for, and in the presence of,  Warner Cooper MD.       History     Chief Complaint   Patient presents with    Leg Pain     States started 3 days ago having pain in leg that is running from lower back to feet - states has tried tylenol and mobic and OTC rubs but nothing seems to be helping     This is a 50 y/o female,who presents to the ED with complaints of right leg pain which started 3 days ago. She states the pain started 3 days ago but was worsened. She notes there was some back pain but not much. There is no hx of any falls or injury. There are no other complaints/pain in the ED at this time. She has a known hx of HTN, GERD, diabetes, and sickle cell anemia. She is a some day smoker. There is no hx of back or spinal surgeries on file.     The history is provided by the patient. No  was used.     Review of patient's allergies indicates:   Allergen Reactions    Phenergan plain Other (See Comments)     Pt states feeling 'funny'     Past Medical History:   Diagnosis Date    Allergy     Depression     Diabetes mellitus, type 2     Epigastric pain 2021    GERD (gastroesophageal reflux disease)     Hypertension     Sickle cell anemia     trait not anemia     Past Surgical History:   Procedure Laterality Date    APPENDECTOMY      ARTHROSCOPY OF KNEE Right 9/3/2024    Procedure: ARTHROSCOPY, KNEE;  Surgeon: Joshua Dahl MD;  Location: [facility] OR;  Service: Orthopedics;  Laterality: Right;    ARTHROSCOPY, KNEE, WITH MEDIAL OR LATERAL MENISCECTOMY Right 9/3/2024    Procedure: ARTHROSCOPY, KNEE, WITH PARTIAL MEDIAL MENISCECTOMY;  Surgeon: Joshua Dahl MD;  Location: [facility] OR;  Service: Orthopedics;  Laterality: Right;    CARPAL TUNNEL RELEASE      CARPAL TUNNEL RELEASE       SECTION      CHOLECYSTECTOMY      ESOPHAGOGASTRODUODENOSCOPY  2021    FLEXOR TENDON REPAIR  Left 12/19/2024    Procedure: REPAIR, TENDON, FLEXOR;  Surgeon: Joshua Dahl MD;  Location: [facility] ORTHO OR;  Service: Orthopedics;  Laterality: Left;    HYSTERECTOMY      KNEE ARTHROSCOPY W/ PLICA EXCISION Right 9/3/2024    Procedure: EXCISION, PLICA, KNEE, ARTHROSCOPIC;  Surgeon: Joshua Dahl MD;  Location: [facility]THO OR;  Service: Orthopedics;  Laterality: Right;    MICROFRACTURE Right 9/3/2024    Procedure: MICROFRACTURE MEDIAL FEMORAL CONDYLE;  Surgeon: Joshua Dahl MD;  Location: [facility] ORTHO OR;  Service: Orthopedics;  Laterality: Right;    TRIGGER FINGER RELEASE Left 12/19/2024    Procedure: RELEASE, TRIGGER FINGER;  Surgeon: Joshua Dahl MD;  Location: [facility] ORTHO OR;  Service: Orthopedics;  Laterality: Left;     Family History   Problem Relation Name Age of Onset    Hypertension Mother Jackpot Laphand     Diabetes Mother Claudette Laphand     Hyperlipidemia Mother Claudette Laphand     Glaucoma Mother Jackpot Laphand     Arthritis Mother Jackpot Laphand     Cancer Father Jj Davis      Social History[1]  Review of Systems   Musculoskeletal:  Positive for back pain (Right sided back pain.).   All other systems reviewed and are negative.      Physical Exam     Initial Vitals [07/22/25 2111]   BP Pulse Resp Temp SpO2   (!) 147/95 91 20 97.9 °F (36.6 °C) 98 %      MAP       --         Physical Exam    Nursing note and vitals reviewed.  Constitutional: She appears well-developed and well-nourished.   HENT:   Head: Normocephalic and atraumatic.   Eyes: EOM are normal. Pupils are equal, round, and reactive to light.   Neck: Neck supple. No thyromegaly present.   Normal range of motion.  Cardiovascular:  Normal rate, regular rhythm, normal heart sounds and intact distal pulses.           No murmur heard.  Pulmonary/Chest: Breath sounds normal. She has no wheezes.   Abdominal: Abdomen is soft. Bowel sounds are normal. There is no abdominal tenderness.   Musculoskeletal:          General: No tenderness or edema. Normal range of motion.      Cervical back: Normal range of motion and neck supple.      Comments: Positive right leg raise.        Lymphadenopathy:     She has no cervical adenopathy.   Neurological: She is alert and oriented to person, place, and time. She has normal strength and normal reflexes. No cranial nerve deficit or sensory deficit. GCS score is 15. GCS eye subscore is 4. GCS verbal subscore is 5. GCS motor subscore is 6.   Skin: Skin is warm and dry. Capillary refill takes less than 2 seconds. No rash noted.   Psychiatric: She has a normal mood and affect.         ED Course   Procedures  Labs Reviewed - No data to display       Imaging Results              X-Ray Lumbar Spine Ap And Lateral (Final result)  Result time 07/23/25 00:57:35      Final result by Leta Alejandra MD (07/23/25 00:57:35)                   Impression:      No acute findings.      Electronically signed by: Leta Alejandra  Date:    07/23/2025  Time:    00:57               Narrative:    EXAMINATION:  XR LUMBAR SPINE AP AND LATERAL    CLINICAL HISTORY:  Right lumbar radiculopathy;    TECHNIQUE:  AP, lateral and spot images were performed of the lumbar spine.    COMPARISON:  07/20/2016    FINDINGS:  No acute fracture or listhesis.  Minimal to mild diffuse multilevel discogenic disease.                                       Medications   orphenadrine injection 60 mg (60 mg Intramuscular Given 7/23/25 0048)   HYDROcodone-acetaminophen 5-325 mg per tablet 1 tablet (1 tablet Oral Given 7/23/25 0048)     Medical Decision Making  This is a 48 y/o female,who presents to the ED with complaints of right leg pain which started 3 days ago. She states the pain started 3 days ago but was worsened. She notes there was some back pain but not much. There is no hx of any falls or injury. There are no other complaints/pain in the ED at this time. She has a known hx of HTN, GERD, diabetes, and sickle cell anemia.  She is a some day smoker. There is no hx of back or spinal surgeries on file.     Amount and/or Complexity of Data Reviewed  Independent Historian:      Details: We spoke with the pt.     Radiology: ordered.    Risk  Prescription drug management.              Attending Attestation:           Physician Attestation for Scribe:  Physician Attestation Statement for Scribe #1: I, Warner Cooper MD, reviewed documentation, as scribed by Zehra Aguirre in my presence, and it is both accurate and complete.             ED Course as of 07/25/25 0325   Wed Jul 23, 2025   0127 X-ray Lumbar Spine Ap and Lateral:  No acute findings.    [BW]      ED Course User Index  [BW] Zehra Aguirre                               Clinical Impression:  Final diagnoses:  [M54.31] Sciatica of right side (Primary)          ED Disposition Condition    Discharge Stable          ED Prescriptions       Medication Sig Dispense Start Date End Date Auth. Provider    HYDROcodone-acetaminophen (NORCO) 5-325 mg per tablet Take 1 tablet by mouth every 8 (eight) hours as needed for Pain. 12 tablet 7/23/2025 -- Warner Cooper MD    orphenadrine (NORFLEX) 100 mg tablet Take 1 tablet (100 mg total) by mouth 2 (two) times daily. for 10 days 20 tablet 7/23/2025 8/2/2025 Warner Cooper MD    predniSONE (DELTASONE) 20 MG tablet Take 1 tablet (20 mg total) by mouth 2 (two) times daily. for 5 days 10 tablet 7/23/2025 7/28/2025 Warner Cooper MD          Follow-up Information       Follow up With Specialties Details Why Contact Info    Shaun Capone, DO Critical Care Medicine, Internal Medicine In 3 days For follow-up [address]  [place] [address] [phone]                     [1]   Social History  Tobacco Use    Smoking status: Former     Types: Cigarettes    Smokeless tobacco: Never    Tobacco comments:     Medical marijuana   Vaping Use    Vaping status: Never Used   Substance Use Topics    Alcohol use: Never    Drug use:  Yes     Types: Marijuana        Warner Cooper MD  07/25/25 0049

## 2025-07-23 NOTE — TELEPHONE ENCOUNTER
Copied from CRM #[id number]. Topic: Appointments - Appointment Scheduling  >> Jul 23, 2025  1:58 PM Nathalie wrote:  Who Called: Cole Herrera    Caller is requesting a sooner appointment. Caller declined first available appointment listed below. Caller will not accept being placed on the waitlist and is requesting a message be sent to doctor.    When is the first available appointment?03-09  Options offered (Virtual Visit, Urgent Care): pcp  Symptoms:E/R [facility] 7/23 pinched nerve       Preferred Method of Contact: Phone Call  Patient's Preferred Phone Number on File: [phone]

## 2025-07-28 ENCOUNTER — HOSPITAL ENCOUNTER (OUTPATIENT)
Dept: RADIOLOGY | Facility: HOSPITAL | Age: 50
Discharge: HOME | End: 2025-07-28
Payer: COMMERCIAL

## 2025-07-28 ENCOUNTER — OFFICE VISIT (OUTPATIENT)
Dept: FAMILY MEDICINE | Facility: CLINIC | Age: 50
End: 2025-07-28
Payer: COMMERCIAL

## 2025-07-28 VITALS
TEMPERATURE: 98 F | BODY MASS INDEX: 29.66 KG/M2 | SYSTOLIC BLOOD PRESSURE: 124 MMHG | HEIGHT: 67 IN | HEART RATE: 113 BPM | WEIGHT: 189 LBS | RESPIRATION RATE: 20 BRPM | OXYGEN SATURATION: 99 % | DIASTOLIC BLOOD PRESSURE: 70 MMHG

## 2025-07-28 DIAGNOSIS — M54.41 ACUTE RIGHT-SIDED LOW BACK PAIN WITH RIGHT-SIDED SCIATICA: ICD-10-CM

## 2025-07-28 DIAGNOSIS — M25.551 RIGHT HIP PAIN: ICD-10-CM

## 2025-07-28 DIAGNOSIS — M25.551 RIGHT HIP PAIN: Primary | ICD-10-CM

## 2025-07-28 PROCEDURE — 1160F RVW MEDS BY RX/DR IN RCRD: CPT | Mod: ,,,

## 2025-07-28 PROCEDURE — 99213 OFFICE O/P EST LOW 20 MIN: CPT | Mod: ,,,

## 2025-07-28 PROCEDURE — 1159F MED LIST DOCD IN RCRD: CPT | Mod: ,,,

## 2025-07-28 PROCEDURE — 3074F SYST BP LT 130 MM HG: CPT | Mod: ,,,

## 2025-07-28 PROCEDURE — 73502 X-RAY EXAM HIP UNI 2-3 VIEWS: CPT | Mod: TC,RT

## 2025-07-28 PROCEDURE — 3008F BODY MASS INDEX DOCD: CPT | Mod: ,,,

## 2025-07-28 PROCEDURE — 3046F HEMOGLOBIN A1C LEVEL >9.0%: CPT | Mod: ,,,

## 2025-07-28 PROCEDURE — 3078F DIAST BP <80 MM HG: CPT | Mod: ,,,

## 2025-07-28 RX ORDER — CYCLOBENZAPRINE HCL 5 MG
5 TABLET ORAL 2 TIMES DAILY PRN
Qty: 20 TABLET | Refills: 0 | Status: SHIPPED | OUTPATIENT
Start: 2025-07-28 | End: 2025-08-07

## 2025-07-28 RX ORDER — MELOXICAM 15 MG/1
15 TABLET ORAL DAILY
Qty: 30 TABLET | Refills: 2 | Status: SHIPPED | OUTPATIENT
Start: 2025-07-28 | End: 2025-10-26

## 2025-07-28 NOTE — PROGRESS NOTES
Subjective     Patient ID: Cole Herrera is a 49 y.o. female.    Chief Complaint: Pain (Went to ER on 7/22 with pain  in lower right back, buttocks, leg, and foot) and knots (In right leg since Sept when she was having therapy before surgery)    JANENE Is a 49 year old female that presents today for complaints of right lower back, right hip, and right buttock pain. Pain is localized to her buttock and right hip but radiates to her right groin. She notes pain is severe and is constant, but worse with movements. She was evaluated in the ED on 7/22 and was given pain medication, muscle relaxers, and prednisone. She notes pain was mildly improved but returned. She denies any recent injury. The pain moves to her right lower extremity and often is associated with numbness.    Pain  Pertinent negatives include no abdominal pain, change in bowel habit, chest pain, chills, coughing, fever, headaches, joint swelling, myalgias, numbness or weakness.     Review of Systems   Constitutional:  Negative for activity change, appetite change, chills and fever.   HENT:  Negative for ear pain, hearing loss, trouble swallowing and voice change.    Eyes:  Negative for visual disturbance.   Respiratory:  Negative for apnea, cough, chest tightness and shortness of breath.    Cardiovascular:  Negative for chest pain, palpitations and leg swelling.   Gastrointestinal:  Negative for abdominal pain, blood in stool, change in bowel habit and reflux.   Genitourinary:  Negative for bladder incontinence, difficulty urinating and flank pain.   Musculoskeletal:  Positive for back pain. Negative for gait problem, joint swelling and myalgias.   Integumentary:  Negative for color change and pallor.   Neurological:  Negative for dizziness, weakness, numbness and headaches.   Psychiatric/Behavioral:  Negative for sleep disturbance. The patient is not nervous/anxious.           Objective     Physical Exam  Vitals and nursing note reviewed.    Constitutional:       Appearance: Normal appearance. She is normal weight.   HENT:      Head: Normocephalic.      Nose: Nose normal.      Mouth/Throat:      Mouth: Mucous membranes are moist.   Eyes:      Extraocular Movements: Extraocular movements intact.      Conjunctiva/sclera: Conjunctivae normal.      Pupils: Pupils are equal, round, and reactive to light.   Cardiovascular:      Rate and Rhythm: Normal rate and regular rhythm.      Pulses: Normal pulses.      Heart sounds: Normal heart sounds.   Pulmonary:      Effort: Pulmonary effort is normal.      Breath sounds: Normal breath sounds.   Abdominal:      General: Abdomen is flat. Bowel sounds are normal.      Palpations: Abdomen is soft.   Musculoskeletal:         General: Normal range of motion.      Cervical back: Normal range of motion and neck supple.   Skin:     General: Skin is warm and dry.      Capillary Refill: Capillary refill takes less than 2 seconds.   Neurological:      General: No focal deficit present.      Mental Status: She is alert and oriented to person, place, and time.   Psychiatric:         Behavior: Behavior normal.         Thought Content: Thought content normal.          Assessment and Plan     1. Right hip pain  -     X-Ray Hip 2 or 3 views Right with Pelvis when performed; Future; Expected date: 07/28/2025  -     meloxicam (MOBIC) 15 MG tablet; Take 1 tablet (15 mg total) by mouth once daily.  Dispense: 30 tablet; Refill: 2    2. Acute right-sided low back pain with right-sided sciatica  -     cyclobenzaprine (FLEXERIL) 5 MG tablet; Take 1 tablet (5 mg total) by mouth 2 (two) times daily as needed for Muscle spasms.  Dispense: 20 tablet; Refill: 0        Xray ordered and pending  Increase mobic to 15mg daily         Follow up if symptoms worsen or fail to improve.

## 2025-07-30 ENCOUNTER — TELEPHONE (OUTPATIENT)
Dept: FAMILY MEDICINE | Facility: CLINIC | Age: 50
End: 2025-07-30
Payer: COMMERCIAL

## 2025-07-30 DIAGNOSIS — M25.551 RIGHT HIP PAIN: Primary | ICD-10-CM

## 2025-07-30 NOTE — TELEPHONE ENCOUNTER
Spoke to pt and she stated she has been having back issues for a few weeks. She stated the right side of her body is numb and she can't feel her feet. She stated she is supposed to start back to work on Monday and wants fmla.   I informed her I did not know if dr segal will do this since we have not been made aware of this and the severity.   She is asking that we find out what the next steps are. I infromed pt I will speak to dr segal tomorrow and give her a call back. Pt thanked me

## 2025-07-30 NOTE — TELEPHONE ENCOUNTER
Copied from CRM #[id number]. Topic: General Inquiry - Patient Advice  >> Jul 30, 2025  9:40 AM Leydi wrote:  Who Called: Cole Herrera      Does the patient know what this is regarding?:pt need to talk to nurse arreola about some things      Preferred Method of Contact: Phone Call  Patient's Preferred Phone Number on File: [phone]

## 2025-07-31 NOTE — TELEPHONE ENCOUNTER
Spoke to pt and let her know dr segal wants her to do Physical therapy and have her get back in with ortho. Pt thanked me

## 2025-08-05 RX ORDER — CITALOPRAM 20 MG/1
20 TABLET ORAL
Qty: 90 TABLET | Refills: 3 | Status: SHIPPED | OUTPATIENT
Start: 2025-08-05

## 2025-08-08 ENCOUNTER — TELEPHONE (OUTPATIENT)
Dept: FAMILY MEDICINE | Facility: CLINIC | Age: 50
End: 2025-08-08
Payer: COMMERCIAL

## 2025-08-08 NOTE — TELEPHONE ENCOUNTER
I called and let the patient know that her xray results came back and that Larisa also recommended physical therapy. Patient asked what the xray showed. I let know her it showed possible infringement which could be worked out through physical therapy. She thanked me and let me know that she has been scheduled for physical therapy on August 15th at 1500.       ----- Message from HOLA Chaparro sent at 8/7/2025  4:58 PM CDT -----  Please let Mrs. Herrera know I have reviewed her xray. I see where she was referred to physical therapy by her PCP. This was going to be my recommendation as well.  ----- Message -----  From: Interface, Rad Results In  Sent: 8/4/2025  10:08 AM CDT  To: HOLA Sue

## 2025-08-08 NOTE — TELEPHONE ENCOUNTER
----- Message from HOLA Chaparro sent at 8/7/2025  4:58 PM CDT -----  Please let Mrs. Herrera know I have reviewed her xray. I see where she was referred to physical therapy by her PCP. This was going to be my recommendation as well.  ----- Message -----  From: Interface, Rad Results In  Sent: 8/4/2025  10:08 AM CDT  To: HOLA Sue

## 2025-08-13 ENCOUNTER — CLINICAL SUPPORT (OUTPATIENT)
Dept: REHABILITATION | Facility: HOSPITAL | Age: 50
End: 2025-08-13
Attending: INTERNAL MEDICINE
Payer: COMMERCIAL

## 2025-08-13 DIAGNOSIS — M25.551 RIGHT HIP PAIN: Primary | ICD-10-CM

## 2025-08-13 DIAGNOSIS — M54.41 ACUTE BILATERAL LOW BACK PAIN WITH RIGHT-SIDED SCIATICA: ICD-10-CM

## 2025-08-13 PROCEDURE — 97162 PT EVAL MOD COMPLEX 30 MIN: CPT

## 2025-08-20 ENCOUNTER — CLINICAL SUPPORT (OUTPATIENT)
Dept: REHABILITATION | Facility: HOSPITAL | Age: 50
End: 2025-08-20
Payer: OTHER MISCELLANEOUS

## 2025-08-20 DIAGNOSIS — M25.551 RIGHT HIP PAIN: Primary | ICD-10-CM

## 2025-08-20 DIAGNOSIS — M54.41 ACUTE BILATERAL LOW BACK PAIN WITH RIGHT-SIDED SCIATICA: ICD-10-CM

## 2025-08-20 PROCEDURE — 97113 AQUATIC THERAPY/EXERCISES: CPT | Mod: CQ

## 2025-08-27 ENCOUNTER — CLINICAL SUPPORT (OUTPATIENT)
Dept: REHABILITATION | Facility: HOSPITAL | Age: 50
End: 2025-08-27
Payer: OTHER MISCELLANEOUS

## 2025-08-27 DIAGNOSIS — M54.41 ACUTE BILATERAL LOW BACK PAIN WITH RIGHT-SIDED SCIATICA: ICD-10-CM

## 2025-08-27 DIAGNOSIS — M25.551 RIGHT HIP PAIN: Primary | ICD-10-CM

## 2025-08-27 PROCEDURE — 97113 AQUATIC THERAPY/EXERCISES: CPT | Mod: CQ

## 2025-08-29 ENCOUNTER — PATIENT MESSAGE (OUTPATIENT)
Facility: HOSPITAL | Age: 50
End: 2025-08-29
Payer: COMMERCIAL

## 2025-09-02 ENCOUNTER — OFFICE VISIT (OUTPATIENT)
Dept: FAMILY MEDICINE | Facility: CLINIC | Age: 50
End: 2025-09-02
Payer: COMMERCIAL

## 2025-09-02 VITALS
HEART RATE: 94 BPM | SYSTOLIC BLOOD PRESSURE: 122 MMHG | RESPIRATION RATE: 18 BRPM | DIASTOLIC BLOOD PRESSURE: 84 MMHG | HEIGHT: 67 IN | WEIGHT: 192 LBS | OXYGEN SATURATION: 99 % | TEMPERATURE: 97 F | BODY MASS INDEX: 30.13 KG/M2

## 2025-09-02 DIAGNOSIS — N76.0 ACUTE VAGINITIS: ICD-10-CM

## 2025-09-02 DIAGNOSIS — R07.0 PAIN IN THROAT: ICD-10-CM

## 2025-09-02 DIAGNOSIS — J01.90 ACUTE NON-RECURRENT SINUSITIS, UNSPECIFIED LOCATION: Primary | ICD-10-CM

## 2025-09-02 DIAGNOSIS — N89.8 VAGINAL DISCHARGE: ICD-10-CM

## 2025-09-02 DIAGNOSIS — R05.9 COUGH, UNSPECIFIED TYPE: ICD-10-CM

## 2025-09-02 DIAGNOSIS — Z20.822 CONTACT WITH AND (SUSPECTED) EXPOSURE TO COVID-19: ICD-10-CM

## 2025-09-02 LAB
BACTERIAL VAGINOSIS DNA (OHS): POSITIVE
BILIRUB UR QL STRIP: NEGATIVE
CANDIDA GLABRATA/KRUSEI DNA (OHS): NOT DETECTED
CANDIDA SPECIES DNA (OHS): DETECTED
CLARITY UR: CLEAR
COLOR UR: COLORLESS
CTP QC/QA: YES
CTP QC/QA: YES
GLUCOSE UR STRIP-MCNC: >1000 MG/DL
KETONES UR STRIP-SCNC: NEGATIVE MG/DL
LEUKOCYTE ESTERASE UR QL STRIP: NEGATIVE
MOLECULAR STREP A: NEGATIVE
NITRITE UR QL STRIP: NEGATIVE
PH UR STRIP: 5.5 PH UNITS
PROT UR QL STRIP: NEGATIVE
RBC # UR STRIP: NEGATIVE /UL
SARS-COV-2 RDRP RESP QL NAA+PROBE: NEGATIVE
SP GR UR STRIP: 1.02
TRICHOMONAS VAGINALIS DNA (OHS): NOT DETECTED
UROBILINOGEN UR STRIP-ACNC: NORMAL MG/DL

## 2025-09-02 PROCEDURE — 81515 NFCT DS BV&VAGINITIS DNA ALG: CPT | Mod: QW,,, | Performed by: CLINICAL MEDICAL LABORATORY

## 2025-09-02 PROCEDURE — 3008F BODY MASS INDEX DOCD: CPT | Mod: ,,,

## 2025-09-02 PROCEDURE — 99213 OFFICE O/P EST LOW 20 MIN: CPT | Mod: ,,,

## 2025-09-02 PROCEDURE — 1159F MED LIST DOCD IN RCRD: CPT | Mod: ,,,

## 2025-09-02 PROCEDURE — 3074F SYST BP LT 130 MM HG: CPT | Mod: ,,,

## 2025-09-02 PROCEDURE — 81003 URINALYSIS AUTO W/O SCOPE: CPT | Mod: QW,,, | Performed by: CLINICAL MEDICAL LABORATORY

## 2025-09-02 PROCEDURE — 3079F DIAST BP 80-89 MM HG: CPT | Mod: ,,,

## 2025-09-02 PROCEDURE — 87651 STREP A DNA AMP PROBE: CPT | Mod: QW,,,

## 2025-09-02 PROCEDURE — 87635 SARS-COV-2 COVID-19 AMP PRB: CPT | Mod: QW,,,

## 2025-09-02 PROCEDURE — 3046F HEMOGLOBIN A1C LEVEL >9.0%: CPT | Mod: ,,,

## 2025-09-02 PROCEDURE — 1160F RVW MEDS BY RX/DR IN RCRD: CPT | Mod: ,,,

## 2025-09-02 RX ORDER — AMOXICILLIN AND CLAVULANATE POTASSIUM 875; 125 MG/1; MG/1
1 TABLET, FILM COATED ORAL EVERY 12 HOURS
Qty: 14 TABLET | Refills: 0 | Status: SHIPPED | OUTPATIENT
Start: 2025-09-02 | End: 2025-09-09

## 2025-09-02 RX ORDER — TRIPROLIDINE HCL, DEXTROMETHORPHAN HBR 10; 1.25 MG/5ML; MG/5ML
10 LIQUID ORAL EVERY 6 HOURS PRN
Qty: 240 ML | Refills: 0 | Status: SHIPPED | OUTPATIENT
Start: 2025-09-02

## 2025-09-02 RX ORDER — FLUCONAZOLE 150 MG/1
150 TABLET ORAL DAILY
Qty: 2 TABLET | Refills: 0 | Status: SHIPPED | OUTPATIENT
Start: 2025-09-02 | End: 2025-09-04

## 2025-09-04 ENCOUNTER — RESULTS FOLLOW-UP (OUTPATIENT)
Dept: FAMILY MEDICINE | Facility: CLINIC | Age: 50
End: 2025-09-04
Payer: COMMERCIAL

## 2025-09-04 DIAGNOSIS — B96.89 BACTERIAL VAGINOSIS: Primary | ICD-10-CM

## 2025-09-04 DIAGNOSIS — N76.0 BACTERIAL VAGINOSIS: Primary | ICD-10-CM

## 2025-09-04 RX ORDER — METRONIDAZOLE 500 MG/1
500 TABLET ORAL EVERY 12 HOURS
Qty: 14 TABLET | Refills: 0 | Status: SHIPPED | OUTPATIENT
Start: 2025-09-04 | End: 2025-09-11

## (undated) DEVICE — SOL NACL IRR 3000ML

## (undated) DEVICE — SYR 10CC LUER LOCK

## (undated) DEVICE — GOWN POLY REINF BRTH SLV XL

## (undated) DEVICE — GLOVE SENSICARE PI GRN 8

## (undated) DEVICE — SLING ARM LARGE FOAM STRAP

## (undated) DEVICE — BANDAGE ACE DOUBLE STER 6IN

## (undated) DEVICE — TOURNIQUET SB QC SP 34X4IN

## (undated) DEVICE — SUT 5/0 18IN PDS II CLR MO

## (undated) DEVICE — CANISTER SUCTION MEDI-VAC 12L

## (undated) DEVICE — APPLICATOR CHLORAPREP ORN 26ML

## (undated) DEVICE — DEVICE SUCTION H20 BROOM 12FT

## (undated) DEVICE — PROBE AARDVARK SUC 0.5X135MM

## (undated) DEVICE — GLOVE SENSICARE PI GRN 7

## (undated) DEVICE — Device

## (undated) DEVICE — PACK KNEE ARTHROSCOPY  RUSH

## (undated) DEVICE — SUT ETHILON 5-0 P3 18IN BLK

## (undated) DEVICE — GLOVE SENSICARE PI SURG 7.5

## (undated) DEVICE — SOCKINETTE DOUBLE PLY 4X48IN

## (undated) DEVICE — BANDAGE MATRIX HK LOOP 2IN 5YD

## (undated) DEVICE — TUBING CROSSFLOW INFLOW CASS

## (undated) DEVICE — NDL HYPODERMIC SAFETY 25G 1IN

## (undated) DEVICE — SHAVER TOMCAT 4.0

## (undated) DEVICE — PADDING WYTEX UNDRCST 2INX4YD

## (undated) DEVICE — PAD ABDOMINAL 8X7.5 STERILE

## (undated) DEVICE — SOL NACL IRR 1000ML BTL